# Patient Record
Sex: FEMALE | Race: WHITE | NOT HISPANIC OR LATINO | Employment: OTHER | URBAN - METROPOLITAN AREA
[De-identification: names, ages, dates, MRNs, and addresses within clinical notes are randomized per-mention and may not be internally consistent; named-entity substitution may affect disease eponyms.]

---

## 2019-02-22 ENCOUNTER — HOSPITAL ENCOUNTER (INPATIENT)
Facility: HOSPITAL | Age: 79
LOS: 4 days | Discharge: NURSING, REHAB OR LONG-TERM CARE | End: 2019-02-26
Attending: EMERGENCY MEDICINE | Admitting: INTERNAL MEDICINE

## 2019-02-22 ENCOUNTER — APPOINTMENT (OUTPATIENT)
Dept: GENERAL RADIOLOGY | Facility: HOSPITAL | Age: 79
End: 2019-02-22

## 2019-02-22 DIAGNOSIS — R26.2 DIFFICULTY WALKING: ICD-10-CM

## 2019-02-22 DIAGNOSIS — S72.002A LEFT DISPLACED FEMORAL NECK FRACTURE (HCC): Primary | ICD-10-CM

## 2019-02-22 PROBLEM — I10 HYPERTENSION: Status: ACTIVE | Noted: 2019-02-22

## 2019-02-22 PROBLEM — R73.02 IMPAIRED GLUCOSE TOLERANCE: Status: ACTIVE | Noted: 2019-02-22

## 2019-02-22 PROBLEM — E03.9 HYPOTHYROIDISM: Status: ACTIVE | Noted: 2019-02-22

## 2019-02-22 LAB
ABO GROUP BLD: NORMAL
ALBUMIN SERPL-MCNC: 4.4 G/DL (ref 3.5–5.2)
ALBUMIN/GLOB SERPL: 1.6 G/DL
ALP SERPL-CCNC: 32 U/L (ref 39–117)
ALT SERPL W P-5'-P-CCNC: 17 U/L (ref 1–33)
ANION GAP SERPL CALCULATED.3IONS-SCNC: 13.2 MMOL/L
AST SERPL-CCNC: 20 U/L (ref 1–32)
BASOPHILS # BLD AUTO: 0.07 10*3/MM3 (ref 0–0.2)
BASOPHILS NFR BLD AUTO: 0.6 % (ref 0–1.5)
BILIRUB SERPL-MCNC: 0.3 MG/DL (ref 0.1–1.2)
BLD GP AB SCN SERPL QL: NEGATIVE
BUN BLD-MCNC: 14 MG/DL (ref 8–23)
BUN/CREAT SERPL: 18.9 (ref 7–25)
CALCIUM SPEC-SCNC: 9.4 MG/DL (ref 8.6–10.5)
CHLORIDE SERPL-SCNC: 100 MMOL/L (ref 98–107)
CO2 SERPL-SCNC: 26.8 MMOL/L (ref 22–29)
CREAT BLD-MCNC: 0.74 MG/DL (ref 0.57–1)
DEPRECATED RDW RBC AUTO: 46.9 FL (ref 37–54)
EOSINOPHIL # BLD AUTO: 0.12 10*3/MM3 (ref 0–0.4)
EOSINOPHIL NFR BLD AUTO: 1 % (ref 0.3–6.2)
ERYTHROCYTE [DISTWIDTH] IN BLOOD BY AUTOMATED COUNT: 13.9 % (ref 12.3–15.4)
GFR SERPL CREATININE-BSD FRML MDRD: 76 ML/MIN/1.73
GLOBULIN UR ELPH-MCNC: 2.8 GM/DL
GLUCOSE BLD-MCNC: 113 MG/DL (ref 65–99)
HCT VFR BLD AUTO: 42.4 % (ref 34–46.6)
HGB BLD-MCNC: 13.3 G/DL (ref 12–15.9)
IMM GRANULOCYTES # BLD AUTO: 0.07 10*3/MM3 (ref 0–0.05)
IMM GRANULOCYTES NFR BLD AUTO: 0.6 % (ref 0–0.5)
INR PPP: 1.04 (ref 0.9–1.1)
LYMPHOCYTES # BLD AUTO: 1.05 10*3/MM3 (ref 0.7–3.1)
LYMPHOCYTES NFR BLD AUTO: 8.5 % (ref 19.6–45.3)
MCH RBC QN AUTO: 28.9 PG (ref 26.6–33)
MCHC RBC AUTO-ENTMCNC: 31.4 G/DL (ref 31.5–35.7)
MCV RBC AUTO: 92.2 FL (ref 79–97)
MONOCYTES # BLD AUTO: 0.89 10*3/MM3 (ref 0.1–0.9)
MONOCYTES NFR BLD AUTO: 7.2 % (ref 5–12)
NEUTROPHILS # BLD AUTO: 10.12 10*3/MM3 (ref 1.4–7)
NEUTROPHILS NFR BLD AUTO: 82.1 % (ref 42.7–76)
NRBC BLD AUTO-RTO: 0 /100 WBC (ref 0–0)
PLATELET # BLD AUTO: 308 10*3/MM3 (ref 140–450)
PMV BLD AUTO: 10.9 FL (ref 6–12)
POTASSIUM BLD-SCNC: 4.6 MMOL/L (ref 3.5–5.2)
PROT SERPL-MCNC: 7.2 G/DL (ref 6–8.5)
PROTHROMBIN TIME: 13.4 SECONDS (ref 11.7–14.2)
RBC # BLD AUTO: 4.6 10*6/MM3 (ref 3.77–5.28)
RH BLD: POSITIVE
SODIUM BLD-SCNC: 140 MMOL/L (ref 136–145)
T&S EXPIRATION DATE: NORMAL
WBC NRBC COR # BLD: 12.32 10*3/MM3 (ref 3.4–10.8)

## 2019-02-22 PROCEDURE — 71045 X-RAY EXAM CHEST 1 VIEW: CPT

## 2019-02-22 PROCEDURE — 73502 X-RAY EXAM HIP UNI 2-3 VIEWS: CPT

## 2019-02-22 PROCEDURE — 86850 RBC ANTIBODY SCREEN: CPT | Performed by: EMERGENCY MEDICINE

## 2019-02-22 PROCEDURE — 99284 EMERGENCY DEPT VISIT MOD MDM: CPT

## 2019-02-22 PROCEDURE — 86900 BLOOD TYPING SEROLOGIC ABO: CPT | Performed by: EMERGENCY MEDICINE

## 2019-02-22 PROCEDURE — 85025 COMPLETE CBC W/AUTO DIFF WBC: CPT | Performed by: EMERGENCY MEDICINE

## 2019-02-22 PROCEDURE — 25010000002 HYDROMORPHONE PER 4 MG: Performed by: EMERGENCY MEDICINE

## 2019-02-22 PROCEDURE — 93005 ELECTROCARDIOGRAM TRACING: CPT | Performed by: HOSPITALIST

## 2019-02-22 PROCEDURE — 86901 BLOOD TYPING SEROLOGIC RH(D): CPT | Performed by: EMERGENCY MEDICINE

## 2019-02-22 PROCEDURE — 85610 PROTHROMBIN TIME: CPT | Performed by: EMERGENCY MEDICINE

## 2019-02-22 PROCEDURE — 80053 COMPREHEN METABOLIC PANEL: CPT | Performed by: EMERGENCY MEDICINE

## 2019-02-22 PROCEDURE — 25010000002 HYDROMORPHONE 1 MG/ML SOLUTION: Performed by: HOSPITALIST

## 2019-02-22 PROCEDURE — 25010000002 ONDANSETRON PER 1 MG: Performed by: EMERGENCY MEDICINE

## 2019-02-22 PROCEDURE — 93010 ELECTROCARDIOGRAM REPORT: CPT | Performed by: INTERNAL MEDICINE

## 2019-02-22 RX ORDER — LETROZOLE 2.5 MG/1
TABLET, FILM COATED ORAL
COMMUNITY
Start: 2018-03-15 | End: 2019-02-22

## 2019-02-22 RX ORDER — ONDANSETRON 4 MG/1
4 TABLET, FILM COATED ORAL EVERY 6 HOURS PRN
Status: DISCONTINUED | OUTPATIENT
Start: 2019-02-22 | End: 2019-02-26 | Stop reason: HOSPADM

## 2019-02-22 RX ORDER — SODIUM CHLORIDE 0.9 % (FLUSH) 0.9 %
3-10 SYRINGE (ML) INJECTION AS NEEDED
Status: DISCONTINUED | OUTPATIENT
Start: 2019-02-22 | End: 2019-02-26 | Stop reason: HOSPADM

## 2019-02-22 RX ORDER — SODIUM CHLORIDE 9 MG/ML
100 INJECTION, SOLUTION INTRAVENOUS CONTINUOUS
Status: DISCONTINUED | OUTPATIENT
Start: 2019-02-23 | End: 2019-02-24

## 2019-02-22 RX ORDER — OMEGA-3S/DHA/EPA/FISH OIL/D3 300MG-1000
400 CAPSULE ORAL DAILY
Status: DISCONTINUED | OUTPATIENT
Start: 2019-02-23 | End: 2019-02-26 | Stop reason: HOSPADM

## 2019-02-22 RX ORDER — HYDROCODONE BITARTRATE AND ACETAMINOPHEN 5; 325 MG/1; MG/1
1 TABLET ORAL EVERY 4 HOURS PRN
Status: DISCONTINUED | OUTPATIENT
Start: 2019-02-22 | End: 2019-02-26 | Stop reason: HOSPADM

## 2019-02-22 RX ORDER — ONDANSETRON 4 MG/1
4 TABLET, ORALLY DISINTEGRATING ORAL EVERY 6 HOURS PRN
Status: DISCONTINUED | OUTPATIENT
Start: 2019-02-22 | End: 2019-02-26 | Stop reason: HOSPADM

## 2019-02-22 RX ORDER — ONDANSETRON 2 MG/ML
4 INJECTION INTRAMUSCULAR; INTRAVENOUS EVERY 6 HOURS PRN
Status: DISCONTINUED | OUTPATIENT
Start: 2019-02-22 | End: 2019-02-26 | Stop reason: HOSPADM

## 2019-02-22 RX ORDER — SIMVASTATIN 40 MG
TABLET ORAL
COMMUNITY
Start: 2016-07-25 | End: 2019-02-22 | Stop reason: SDUPTHER

## 2019-02-22 RX ORDER — SIMVASTATIN 40 MG
40 TABLET ORAL DAILY
COMMUNITY
End: 2022-09-03 | Stop reason: HOSPADM

## 2019-02-22 RX ORDER — LETROZOLE 2.5 MG/1
2.5 TABLET, FILM COATED ORAL DAILY
Status: DISCONTINUED | OUTPATIENT
Start: 2019-02-23 | End: 2019-02-26 | Stop reason: HOSPADM

## 2019-02-22 RX ORDER — LEVOTHYROXINE SODIUM 112 UG/1
112 TABLET ORAL
Status: DISCONTINUED | OUTPATIENT
Start: 2019-02-23 | End: 2019-02-26 | Stop reason: HOSPADM

## 2019-02-22 RX ORDER — ERGOCALCIFEROL (VITAMIN D2) 10 MCG
400 TABLET ORAL DAILY
COMMUNITY

## 2019-02-22 RX ORDER — LISINOPRIL 10 MG/1
TABLET ORAL
COMMUNITY
End: 2019-02-22

## 2019-02-22 RX ORDER — LEVOTHYROXINE SODIUM 112 UG/1
TABLET ORAL
COMMUNITY
Start: 2016-06-23 | End: 2019-02-22

## 2019-02-22 RX ORDER — CHLORAL HYDRATE 500 MG
1000 CAPSULE ORAL 2 TIMES DAILY
COMMUNITY

## 2019-02-22 RX ORDER — ACETAMINOPHEN 325 MG/1
650 TABLET ORAL EVERY 4 HOURS PRN
Status: DISCONTINUED | OUTPATIENT
Start: 2019-02-22 | End: 2019-02-26 | Stop reason: HOSPADM

## 2019-02-22 RX ORDER — CALCIUM CARBONATE 500(1250)
500 TABLET ORAL DAILY
Status: DISCONTINUED | OUTPATIENT
Start: 2019-02-23 | End: 2019-02-26 | Stop reason: HOSPADM

## 2019-02-22 RX ORDER — LEVOTHYROXINE SODIUM 112 UG/1
112 TABLET ORAL DAILY
COMMUNITY
End: 2022-09-03 | Stop reason: HOSPADM

## 2019-02-22 RX ORDER — TRIAMCINOLONE ACETONIDE 1 MG/G
1 CREAM TOPICAL AS NEEDED
COMMUNITY

## 2019-02-22 RX ORDER — ATORVASTATIN CALCIUM 20 MG/1
20 TABLET, FILM COATED ORAL DAILY
Status: DISCONTINUED | OUTPATIENT
Start: 2019-02-23 | End: 2019-02-26 | Stop reason: HOSPADM

## 2019-02-22 RX ORDER — NALOXONE HCL 0.4 MG/ML
0.4 VIAL (ML) INJECTION
Status: DISCONTINUED | OUTPATIENT
Start: 2019-02-22 | End: 2019-02-26 | Stop reason: HOSPADM

## 2019-02-22 RX ORDER — HYDROMORPHONE HYDROCHLORIDE 1 MG/ML
0.5 INJECTION, SOLUTION INTRAMUSCULAR; INTRAVENOUS; SUBCUTANEOUS ONCE
Status: COMPLETED | OUTPATIENT
Start: 2019-02-22 | End: 2019-02-22

## 2019-02-22 RX ORDER — MULTIPLE VITAMINS W/ MINERALS TAB 9MG-400MCG
1 TAB ORAL DAILY
Status: DISCONTINUED | OUTPATIENT
Start: 2019-02-23 | End: 2019-02-26 | Stop reason: HOSPADM

## 2019-02-22 RX ORDER — ASPIRIN 81 MG/1
81 TABLET ORAL DAILY
Status: DISCONTINUED | OUTPATIENT
Start: 2019-02-23 | End: 2019-02-23

## 2019-02-22 RX ORDER — TRIAMCINOLONE ACETONIDE 1 MG/G
CREAM TOPICAL
COMMUNITY
End: 2019-02-22

## 2019-02-22 RX ORDER — LETROZOLE 2.5 MG/1
2.5 TABLET, FILM COATED ORAL DAILY
COMMUNITY

## 2019-02-22 RX ORDER — LISINOPRIL 10 MG/1
10 TABLET ORAL DAILY
COMMUNITY
End: 2019-02-26 | Stop reason: HOSPADM

## 2019-02-22 RX ORDER — ONDANSETRON 2 MG/ML
4 INJECTION INTRAMUSCULAR; INTRAVENOUS ONCE
Status: COMPLETED | OUTPATIENT
Start: 2019-02-22 | End: 2019-02-22

## 2019-02-22 RX ORDER — SODIUM CHLORIDE 0.9 % (FLUSH) 0.9 %
3 SYRINGE (ML) INJECTION EVERY 12 HOURS SCHEDULED
Status: DISCONTINUED | OUTPATIENT
Start: 2019-02-23 | End: 2019-02-26 | Stop reason: HOSPADM

## 2019-02-22 RX ADMIN — HYDROMORPHONE HYDROCHLORIDE 0.5 MG: 1 INJECTION, SOLUTION INTRAMUSCULAR; INTRAVENOUS; SUBCUTANEOUS at 18:26

## 2019-02-22 RX ADMIN — HYDROMORPHONE HYDROCHLORIDE 0.5 MG: 1 INJECTION, SOLUTION INTRAMUSCULAR; INTRAVENOUS; SUBCUTANEOUS at 22:28

## 2019-02-22 RX ADMIN — ONDANSETRON HYDROCHLORIDE 4 MG: 2 SOLUTION INTRAMUSCULAR; INTRAVENOUS at 18:26

## 2019-02-23 ENCOUNTER — ANESTHESIA (OUTPATIENT)
Dept: PERIOP | Facility: HOSPITAL | Age: 79
End: 2019-02-23

## 2019-02-23 ENCOUNTER — APPOINTMENT (OUTPATIENT)
Dept: GENERAL RADIOLOGY | Facility: HOSPITAL | Age: 79
End: 2019-02-23

## 2019-02-23 ENCOUNTER — ANESTHESIA EVENT (OUTPATIENT)
Dept: PERIOP | Facility: HOSPITAL | Age: 79
End: 2019-02-23

## 2019-02-23 LAB
ANION GAP SERPL CALCULATED.3IONS-SCNC: 12.2 MMOL/L
BUN BLD-MCNC: 15 MG/DL (ref 8–23)
BUN/CREAT SERPL: 18.3 (ref 7–25)
CALCIUM SPEC-SCNC: 9 MG/DL (ref 8.6–10.5)
CHLORIDE SERPL-SCNC: 99 MMOL/L (ref 98–107)
CO2 SERPL-SCNC: 27.8 MMOL/L (ref 22–29)
CREAT BLD-MCNC: 0.82 MG/DL (ref 0.57–1)
DEPRECATED RDW RBC AUTO: 49.4 FL (ref 37–54)
ERYTHROCYTE [DISTWIDTH] IN BLOOD BY AUTOMATED COUNT: 14.2 % (ref 12.3–15.4)
GFR SERPL CREATININE-BSD FRML MDRD: 68 ML/MIN/1.73
GLUCOSE BLD-MCNC: 104 MG/DL (ref 65–99)
HCT VFR BLD AUTO: 33.3 % (ref 34–46.6)
HCT VFR BLD AUTO: 40.8 % (ref 34–46.6)
HGB BLD-MCNC: 10.3 G/DL (ref 12–15.9)
HGB BLD-MCNC: 12.8 G/DL (ref 12–15.9)
MCH RBC QN AUTO: 29.4 PG (ref 26.6–33)
MCHC RBC AUTO-ENTMCNC: 31.4 G/DL (ref 31.5–35.7)
MCV RBC AUTO: 93.8 FL (ref 79–97)
PLATELET # BLD AUTO: 267 10*3/MM3 (ref 140–450)
PMV BLD AUTO: 10.4 FL (ref 6–12)
POTASSIUM BLD-SCNC: 4.4 MMOL/L (ref 3.5–5.2)
RBC # BLD AUTO: 4.35 10*6/MM3 (ref 3.77–5.28)
SODIUM BLD-SCNC: 139 MMOL/L (ref 136–145)
WBC NRBC COR # BLD: 9.9 10*3/MM3 (ref 3.4–10.8)

## 2019-02-23 PROCEDURE — 76000 FLUOROSCOPY <1 HR PHYS/QHP: CPT

## 2019-02-23 PROCEDURE — 25010000003 CEFAZOLIN IN DEXTROSE 2-4 GM/100ML-% SOLUTION: Performed by: ORTHOPAEDIC SURGERY

## 2019-02-23 PROCEDURE — C1776 JOINT DEVICE (IMPLANTABLE): HCPCS | Performed by: ORTHOPAEDIC SURGERY

## 2019-02-23 PROCEDURE — 25010000002 MIDAZOLAM PER 1 MG: Performed by: ANESTHESIOLOGY

## 2019-02-23 PROCEDURE — 25010000002 NEOSTIGMINE 0.5 MG/ML SOLUTION: Performed by: ANESTHESIOLOGY

## 2019-02-23 PROCEDURE — 80048 BASIC METABOLIC PNL TOTAL CA: CPT | Performed by: HOSPITALIST

## 2019-02-23 PROCEDURE — 73501 X-RAY EXAM HIP UNI 1 VIEW: CPT

## 2019-02-23 PROCEDURE — 0SRB04A REPLACEMENT OF LEFT HIP JOINT WITH CERAMIC ON POLYETHYLENE SYNTHETIC SUBSTITUTE, UNCEMENTED, OPEN APPROACH: ICD-10-PCS | Performed by: ORTHOPAEDIC SURGERY

## 2019-02-23 PROCEDURE — 85027 COMPLETE CBC AUTOMATED: CPT | Performed by: HOSPITALIST

## 2019-02-23 PROCEDURE — 85014 HEMATOCRIT: CPT | Performed by: INTERNAL MEDICINE

## 2019-02-23 PROCEDURE — 25010000002 ONDANSETRON PER 1 MG: Performed by: ANESTHESIOLOGY

## 2019-02-23 PROCEDURE — 25010000002 HYDROMORPHONE 1 MG/ML SOLUTION: Performed by: HOSPITALIST

## 2019-02-23 PROCEDURE — 25010000002 FENTANYL CITRATE (PF) 100 MCG/2ML SOLUTION: Performed by: ANESTHESIOLOGY

## 2019-02-23 PROCEDURE — 25010000002 PROPOFOL 10 MG/ML EMULSION: Performed by: ANESTHESIOLOGY

## 2019-02-23 PROCEDURE — 25010000002 ROPIVACAINE PER 1 MG: Performed by: ORTHOPAEDIC SURGERY

## 2019-02-23 PROCEDURE — 85018 HEMOGLOBIN: CPT | Performed by: INTERNAL MEDICINE

## 2019-02-23 PROCEDURE — 25010000002 KETOROLAC TROMETHAMINE PER 15 MG: Performed by: ORTHOPAEDIC SURGERY

## 2019-02-23 PROCEDURE — 25010000002 CLONIDINE PER 1 MG: Performed by: ORTHOPAEDIC SURGERY

## 2019-02-23 DEVICE — BIOLOX DELTA CERAMIC FEMORAL HEAD 32MM DIA +5.0 12/14 TAPER
Type: IMPLANTABLE DEVICE | Site: HIP | Status: FUNCTIONAL
Brand: BIOLOX DELTA

## 2019-02-23 DEVICE — TOTL HIP GRIPTION CUP DEPUY UPCHRG: Type: IMPLANTABLE DEVICE | Site: HIP | Status: FUNCTIONAL

## 2019-02-23 DEVICE — PINNACLE GRIPTION ACETABULAR SHELL SECTOR 50MM OD
Type: IMPLANTABLE DEVICE | Site: HIP | Status: FUNCTIONAL
Brand: PINNACLE GRIPTION

## 2019-02-23 DEVICE — TOTL HIP STEM DEPUY UPCHRG: Type: IMPLANTABLE DEVICE | Site: HIP | Status: FUNCTIONAL

## 2019-02-23 DEVICE — CORAIL HIP SYSTEM CEMENTLESS FEMORAL STEM 12/14 AMT 135 DEGREES KA SIZE 11 HA COATED STANDARD COLLAR
Type: IMPLANTABLE DEVICE | Site: HIP | Status: FUNCTIONAL
Brand: CORAIL

## 2019-02-23 DEVICE — SUT FW #2 W/TPR NDL 1/2 CIR 38IN 97CM 26.5MM BLU: Type: IMPLANTABLE DEVICE | Site: HIP | Status: FUNCTIONAL

## 2019-02-23 DEVICE — TOTL HIP COA DEPUY 9641334: Type: IMPLANTABLE DEVICE | Site: HIP | Status: FUNCTIONAL

## 2019-02-23 DEVICE — PINNACLE HIP SOLUTIONS ALTRX POLYETHYLENE ACETABULAR LINER NEUTRAL 32MM ID 50MM OD
Type: IMPLANTABLE DEVICE | Site: HIP | Status: FUNCTIONAL
Brand: PINNACLE ALTRX

## 2019-02-23 RX ORDER — MIDAZOLAM HYDROCHLORIDE 1 MG/ML
1 INJECTION INTRAMUSCULAR; INTRAVENOUS
Status: DISCONTINUED | OUTPATIENT
Start: 2019-02-23 | End: 2019-02-23 | Stop reason: HOSPADM

## 2019-02-23 RX ORDER — PROMETHAZINE HYDROCHLORIDE 25 MG/ML
12.5 INJECTION, SOLUTION INTRAMUSCULAR; INTRAVENOUS ONCE AS NEEDED
Status: DISCONTINUED | OUTPATIENT
Start: 2019-02-23 | End: 2019-02-23 | Stop reason: HOSPADM

## 2019-02-23 RX ORDER — LABETALOL HYDROCHLORIDE 5 MG/ML
5 INJECTION, SOLUTION INTRAVENOUS
Status: DISCONTINUED | OUTPATIENT
Start: 2019-02-23 | End: 2019-02-23 | Stop reason: HOSPADM

## 2019-02-23 RX ORDER — FAMOTIDINE 10 MG/ML
20 INJECTION, SOLUTION INTRAVENOUS ONCE
Status: COMPLETED | OUTPATIENT
Start: 2019-02-23 | End: 2019-02-23

## 2019-02-23 RX ORDER — FENTANYL CITRATE 50 UG/ML
50 INJECTION, SOLUTION INTRAMUSCULAR; INTRAVENOUS
Status: DISCONTINUED | OUTPATIENT
Start: 2019-02-23 | End: 2019-02-23 | Stop reason: HOSPADM

## 2019-02-23 RX ORDER — HYDRALAZINE HYDROCHLORIDE 20 MG/ML
5 INJECTION INTRAMUSCULAR; INTRAVENOUS
Status: DISCONTINUED | OUTPATIENT
Start: 2019-02-23 | End: 2019-02-23 | Stop reason: HOSPADM

## 2019-02-23 RX ORDER — BISACODYL 5 MG/1
10 TABLET, DELAYED RELEASE ORAL DAILY PRN
Status: DISCONTINUED | OUTPATIENT
Start: 2019-02-23 | End: 2019-02-26 | Stop reason: HOSPADM

## 2019-02-23 RX ORDER — EPHEDRINE SULFATE 50 MG/ML
5 INJECTION, SOLUTION INTRAVENOUS ONCE AS NEEDED
Status: DISCONTINUED | OUTPATIENT
Start: 2019-02-23 | End: 2019-02-23 | Stop reason: HOSPADM

## 2019-02-23 RX ORDER — HYDROCODONE BITARTRATE AND ACETAMINOPHEN 7.5; 325 MG/1; MG/1
1 TABLET ORAL ONCE AS NEEDED
Status: DISCONTINUED | OUTPATIENT
Start: 2019-02-23 | End: 2019-02-23 | Stop reason: HOSPADM

## 2019-02-23 RX ORDER — FENTANYL CITRATE 50 UG/ML
100 INJECTION, SOLUTION INTRAMUSCULAR; INTRAVENOUS
Status: DISCONTINUED | OUTPATIENT
Start: 2019-02-23 | End: 2019-02-23 | Stop reason: HOSPADM

## 2019-02-23 RX ORDER — DIPHENHYDRAMINE HCL 25 MG
25 CAPSULE ORAL
Status: DISCONTINUED | OUTPATIENT
Start: 2019-02-23 | End: 2019-02-23 | Stop reason: HOSPADM

## 2019-02-23 RX ORDER — SODIUM CHLORIDE 0.9 % (FLUSH) 0.9 %
1-10 SYRINGE (ML) INJECTION AS NEEDED
Status: DISCONTINUED | OUTPATIENT
Start: 2019-02-23 | End: 2019-02-23 | Stop reason: HOSPADM

## 2019-02-23 RX ORDER — LIDOCAINE HYDROCHLORIDE 20 MG/ML
INJECTION, SOLUTION INFILTRATION; PERINEURAL AS NEEDED
Status: DISCONTINUED | OUTPATIENT
Start: 2019-02-23 | End: 2019-02-23 | Stop reason: SURG

## 2019-02-23 RX ORDER — FLUMAZENIL 0.1 MG/ML
0.2 INJECTION INTRAVENOUS AS NEEDED
Status: DISCONTINUED | OUTPATIENT
Start: 2019-02-23 | End: 2019-02-23 | Stop reason: HOSPADM

## 2019-02-23 RX ORDER — GLYCOPYRROLATE 0.2 MG/ML
INJECTION INTRAMUSCULAR; INTRAVENOUS AS NEEDED
Status: DISCONTINUED | OUTPATIENT
Start: 2019-02-23 | End: 2019-02-23 | Stop reason: SURG

## 2019-02-23 RX ORDER — BISACODYL 10 MG
10 SUPPOSITORY, RECTAL RECTAL DAILY PRN
Status: DISCONTINUED | OUTPATIENT
Start: 2019-02-23 | End: 2019-02-26 | Stop reason: HOSPADM

## 2019-02-23 RX ORDER — DIPHENHYDRAMINE HYDROCHLORIDE 50 MG/ML
12.5 INJECTION INTRAMUSCULAR; INTRAVENOUS
Status: DISCONTINUED | OUTPATIENT
Start: 2019-02-23 | End: 2019-02-23 | Stop reason: HOSPADM

## 2019-02-23 RX ORDER — ACETAMINOPHEN 325 MG/1
650 TABLET ORAL ONCE AS NEEDED
Status: DISCONTINUED | OUTPATIENT
Start: 2019-02-23 | End: 2019-02-23 | Stop reason: HOSPADM

## 2019-02-23 RX ORDER — ONDANSETRON 2 MG/ML
4 INJECTION INTRAMUSCULAR; INTRAVENOUS ONCE AS NEEDED
Status: DISCONTINUED | OUTPATIENT
Start: 2019-02-23 | End: 2019-02-23 | Stop reason: HOSPADM

## 2019-02-23 RX ORDER — ACETAMINOPHEN 500 MG
1000 TABLET ORAL ONCE
Status: COMPLETED | OUTPATIENT
Start: 2019-02-23 | End: 2019-02-23

## 2019-02-23 RX ORDER — NALOXONE HCL 0.4 MG/ML
0.2 VIAL (ML) INJECTION AS NEEDED
Status: DISCONTINUED | OUTPATIENT
Start: 2019-02-23 | End: 2019-02-23 | Stop reason: HOSPADM

## 2019-02-23 RX ORDER — PROMETHAZINE HYDROCHLORIDE 25 MG/1
25 TABLET ORAL ONCE AS NEEDED
Status: DISCONTINUED | OUTPATIENT
Start: 2019-02-23 | End: 2019-02-23 | Stop reason: HOSPADM

## 2019-02-23 RX ORDER — SODIUM CHLORIDE 9 MG/ML
100 INJECTION, SOLUTION INTRAVENOUS CONTINUOUS
Status: DISCONTINUED | OUTPATIENT
Start: 2019-02-23 | End: 2019-02-24

## 2019-02-23 RX ORDER — ASPIRIN 325 MG
325 TABLET, DELAYED RELEASE (ENTERIC COATED) ORAL EVERY 12 HOURS SCHEDULED
Status: DISCONTINUED | OUTPATIENT
Start: 2019-02-24 | End: 2019-02-26 | Stop reason: HOSPADM

## 2019-02-23 RX ORDER — ONDANSETRON 2 MG/ML
INJECTION INTRAMUSCULAR; INTRAVENOUS AS NEEDED
Status: DISCONTINUED | OUTPATIENT
Start: 2019-02-23 | End: 2019-02-23 | Stop reason: SURG

## 2019-02-23 RX ORDER — PROPOFOL 10 MG/ML
VIAL (ML) INTRAVENOUS AS NEEDED
Status: DISCONTINUED | OUTPATIENT
Start: 2019-02-23 | End: 2019-02-23 | Stop reason: SURG

## 2019-02-23 RX ORDER — OXYCODONE AND ACETAMINOPHEN 7.5; 325 MG/1; MG/1
1 TABLET ORAL ONCE AS NEEDED
Status: DISCONTINUED | OUTPATIENT
Start: 2019-02-23 | End: 2019-02-23 | Stop reason: HOSPADM

## 2019-02-23 RX ORDER — MAGNESIUM HYDROXIDE 1200 MG/15ML
LIQUID ORAL AS NEEDED
Status: DISCONTINUED | OUTPATIENT
Start: 2019-02-23 | End: 2019-02-23 | Stop reason: HOSPADM

## 2019-02-23 RX ORDER — TRANEXAMIC ACID 100 MG/ML
INJECTION, SOLUTION INTRAVENOUS AS NEEDED
Status: DISCONTINUED | OUTPATIENT
Start: 2019-02-23 | End: 2019-02-23 | Stop reason: SURG

## 2019-02-23 RX ORDER — ROCURONIUM BROMIDE 10 MG/ML
INJECTION, SOLUTION INTRAVENOUS AS NEEDED
Status: DISCONTINUED | OUTPATIENT
Start: 2019-02-23 | End: 2019-02-23 | Stop reason: SURG

## 2019-02-23 RX ORDER — CEFAZOLIN SODIUM 2 G/100ML
2 INJECTION, SOLUTION INTRAVENOUS ONCE
Status: COMPLETED | OUTPATIENT
Start: 2019-02-23 | End: 2019-02-23

## 2019-02-23 RX ORDER — OXYCODONE HCL 10 MG/1
10 TABLET, FILM COATED, EXTENDED RELEASE ORAL ONCE
Status: COMPLETED | OUTPATIENT
Start: 2019-02-23 | End: 2019-02-23

## 2019-02-23 RX ORDER — FENTANYL CITRATE 50 UG/ML
INJECTION, SOLUTION INTRAMUSCULAR; INTRAVENOUS
Status: COMPLETED
Start: 2019-02-23 | End: 2019-02-23

## 2019-02-23 RX ORDER — PROMETHAZINE HYDROCHLORIDE 25 MG/1
25 SUPPOSITORY RECTAL ONCE AS NEEDED
Status: DISCONTINUED | OUTPATIENT
Start: 2019-02-23 | End: 2019-02-23 | Stop reason: HOSPADM

## 2019-02-23 RX ORDER — LIDOCAINE HYDROCHLORIDE 10 MG/ML
0.5 INJECTION, SOLUTION EPIDURAL; INFILTRATION; INTRACAUDAL; PERINEURAL ONCE AS NEEDED
Status: DISCONTINUED | OUTPATIENT
Start: 2019-02-23 | End: 2019-02-23 | Stop reason: HOSPADM

## 2019-02-23 RX ORDER — HYDROMORPHONE HYDROCHLORIDE 1 MG/ML
0.5 INJECTION, SOLUTION INTRAMUSCULAR; INTRAVENOUS; SUBCUTANEOUS
Status: DISCONTINUED | OUTPATIENT
Start: 2019-02-23 | End: 2019-02-23 | Stop reason: HOSPADM

## 2019-02-23 RX ORDER — SODIUM CHLORIDE 0.9 % (FLUSH) 0.9 %
3 SYRINGE (ML) INJECTION EVERY 12 HOURS SCHEDULED
Status: DISCONTINUED | OUTPATIENT
Start: 2019-02-23 | End: 2019-02-23 | Stop reason: HOSPADM

## 2019-02-23 RX ORDER — SODIUM CHLORIDE, SODIUM LACTATE, POTASSIUM CHLORIDE, CALCIUM CHLORIDE 600; 310; 30; 20 MG/100ML; MG/100ML; MG/100ML; MG/100ML
9 INJECTION, SOLUTION INTRAVENOUS CONTINUOUS
Status: DISCONTINUED | OUTPATIENT
Start: 2019-02-23 | End: 2019-02-24

## 2019-02-23 RX ORDER — DOCUSATE SODIUM 100 MG/1
100 CAPSULE, LIQUID FILLED ORAL 2 TIMES DAILY PRN
Status: DISCONTINUED | OUTPATIENT
Start: 2019-02-23 | End: 2019-02-26 | Stop reason: HOSPADM

## 2019-02-23 RX ORDER — MIDAZOLAM HYDROCHLORIDE 1 MG/ML
2 INJECTION INTRAMUSCULAR; INTRAVENOUS
Status: DISCONTINUED | OUTPATIENT
Start: 2019-02-23 | End: 2019-02-23 | Stop reason: HOSPADM

## 2019-02-23 RX ORDER — CEFAZOLIN SODIUM 2 G/100ML
2 INJECTION, SOLUTION INTRAVENOUS EVERY 8 HOURS
Status: COMPLETED | OUTPATIENT
Start: 2019-02-23 | End: 2019-02-24

## 2019-02-23 RX ADMIN — HYDROMORPHONE HYDROCHLORIDE 0.5 MG: 1 INJECTION, SOLUTION INTRAMUSCULAR; INTRAVENOUS; SUBCUTANEOUS at 05:26

## 2019-02-23 RX ADMIN — LETROZOLE 2.5 MG: 2.5 TABLET ORAL at 08:55

## 2019-02-23 RX ADMIN — SODIUM CHLORIDE, PRESERVATIVE FREE 3 ML: 5 INJECTION INTRAVENOUS at 08:55

## 2019-02-23 RX ADMIN — ROCURONIUM BROMIDE 10 MG: 10 INJECTION INTRAVENOUS at 13:50

## 2019-02-23 RX ADMIN — ATORVASTATIN CALCIUM 20 MG: 20 TABLET, FILM COATED ORAL at 08:55

## 2019-02-23 RX ADMIN — OXYCODONE HYDROCHLORIDE 10 MG: 10 TABLET, FILM COATED, EXTENDED RELEASE ORAL at 12:46

## 2019-02-23 RX ADMIN — LEVOTHYROXINE SODIUM 112 MCG: 0.11 TABLET ORAL at 05:31

## 2019-02-23 RX ADMIN — EPHEDRINE SULFATE 5 MG: 50 INJECTION INTRAMUSCULAR; INTRAVENOUS; SUBCUTANEOUS at 13:32

## 2019-02-23 RX ADMIN — FENTANYL CITRATE 25 MCG: 50 INJECTION, SOLUTION INTRAMUSCULAR; INTRAVENOUS at 12:48

## 2019-02-23 RX ADMIN — SODIUM CHLORIDE, POTASSIUM CHLORIDE, SODIUM LACTATE AND CALCIUM CHLORIDE 9 ML/HR: 600; 310; 30; 20 INJECTION, SOLUTION INTRAVENOUS at 12:46

## 2019-02-23 RX ADMIN — FAMOTIDINE 20 MG: 10 INJECTION INTRAVENOUS at 12:48

## 2019-02-23 RX ADMIN — MIDAZOLAM 0.5 MG: 1 INJECTION INTRAMUSCULAR; INTRAVENOUS at 12:48

## 2019-02-23 RX ADMIN — PROPOFOL 50 MG: 10 INJECTION, EMULSION INTRAVENOUS at 13:27

## 2019-02-23 RX ADMIN — MUPIROCIN 1 APPLICATION: 20 OINTMENT TOPICAL at 13:07

## 2019-02-23 RX ADMIN — CEFAZOLIN SODIUM 2 G: 2 INJECTION, SOLUTION INTRAVENOUS at 20:35

## 2019-02-23 RX ADMIN — SODIUM CHLORIDE 100 ML/HR: 9 INJECTION, SOLUTION INTRAVENOUS at 17:59

## 2019-02-23 RX ADMIN — ONDANSETRON 4 MG: 2 INJECTION INTRAMUSCULAR; INTRAVENOUS at 13:46

## 2019-02-23 RX ADMIN — GLYCOPYRROLATE 0.3 MG: 0.2 INJECTION INTRAMUSCULAR; INTRAVENOUS at 14:56

## 2019-02-23 RX ADMIN — PROPOFOL 30 MG: 10 INJECTION, EMULSION INTRAVENOUS at 13:29

## 2019-02-23 RX ADMIN — LIDOCAINE HYDROCHLORIDE 40 MG: 20 INJECTION, SOLUTION INFILTRATION; PERINEURAL at 13:27

## 2019-02-23 RX ADMIN — HYDROMORPHONE HYDROCHLORIDE 0.5 MG: 1 INJECTION, SOLUTION INTRAMUSCULAR; INTRAVENOUS; SUBCUTANEOUS at 08:48

## 2019-02-23 RX ADMIN — LIDOCAINE HYDROCHLORIDE 60 MG: 20 INJECTION, SOLUTION INFILTRATION; PERINEURAL at 13:38

## 2019-02-23 RX ADMIN — ASPIRIN 81 MG: 81 TABLET, DELAYED RELEASE ORAL at 08:55

## 2019-02-23 RX ADMIN — SODIUM CHLORIDE 500 ML: 9 INJECTION, SOLUTION INTRAVENOUS at 23:30

## 2019-02-23 RX ADMIN — FENTANYL CITRATE 25 MCG: 50 INJECTION INTRAMUSCULAR; INTRAVENOUS at 13:39

## 2019-02-23 RX ADMIN — SODIUM CHLORIDE 100 ML/HR: 9 INJECTION, SOLUTION INTRAVENOUS at 00:22

## 2019-02-23 RX ADMIN — NEOSTIGMINE METHYLSULFATE 2.5 MG: 5 INJECTION, SOLUTION INTRAMUSCULAR; INTRAVENOUS; SUBCUTANEOUS at 14:56

## 2019-02-23 RX ADMIN — TRANEXAMIC ACID 1000 MG: 100 INJECTION, SOLUTION INTRAVENOUS at 13:45

## 2019-02-23 RX ADMIN — MULTIPLE VITAMINS W/ MINERALS TAB 1 TABLET: TAB at 08:55

## 2019-02-23 RX ADMIN — CALCIUM 500 MG: 500 TABLET ORAL at 08:55

## 2019-02-23 RX ADMIN — CHOLECALCIFEROL TAB 10 MCG (400 UNIT) 400 UNITS: 10 TAB at 08:55

## 2019-02-23 RX ADMIN — HYDROCODONE BITARTRATE AND ACETAMINOPHEN 1 TABLET: 5; 325 TABLET ORAL at 20:35

## 2019-02-23 RX ADMIN — GLYCOPYRROLATE 0.1 MG: 0.2 INJECTION INTRAMUSCULAR; INTRAVENOUS at 13:27

## 2019-02-23 RX ADMIN — SODIUM CHLORIDE, PRESERVATIVE FREE 3 ML: 5 INJECTION INTRAVENOUS at 01:50

## 2019-02-23 RX ADMIN — CEFAZOLIN SODIUM 2 G: 2 INJECTION, SOLUTION INTRAVENOUS at 13:43

## 2019-02-23 RX ADMIN — SODIUM CHLORIDE, PRESERVATIVE FREE 3 ML: 5 INJECTION INTRAVENOUS at 20:42

## 2019-02-23 RX ADMIN — ROCURONIUM BROMIDE 25 MG: 10 INJECTION INTRAVENOUS at 13:38

## 2019-02-23 RX ADMIN — PROPOFOL 10 MG: 10 INJECTION, EMULSION INTRAVENOUS at 13:38

## 2019-02-23 RX ADMIN — ACETAMINOPHEN 1000 MG: 500 TABLET, FILM COATED ORAL at 12:46

## 2019-02-23 NOTE — ANESTHESIA POSTPROCEDURE EVALUATION
Patient: Kennedi Cardenas    Procedure Summary     Date:  02/23/19 Room / Location:  [facility] OR  / [facility] MAIN OR    Anesthesia Start:  1322 Anesthesia Stop:  1530    Procedure:  TOTAL HIP ARTHROPLASTY ANTERIOR (Left Hip) Diagnosis:       Closed displaced fracture of neck of left femur (CMS/HCC)      (Closed displaced fracture of neck of left femur)    Surgeon:  Magdalena De Los Santos MD Provider:  Gunnar Millan MD    Anesthesia Type:  general ASA Status:  2          Anesthesia Type: general  Last vitals  BP   93/52 (02/23/19 1640)   Temp   36.4 °C (97.6 °F) (02/23/19 1620)   Pulse   73 (02/23/19 1640)   Resp   16 (02/23/19 1640)     SpO2   93 % (02/23/19 1640)     Post Anesthesia Care and Evaluation    Patient location during evaluation: bedside  Patient participation: complete - patient participated  Level of consciousness: awake  Pain score: 1  Pain management: adequate  Airway patency: patent  Anesthetic complications: No anesthetic complications    Cardiovascular status: acceptable  Respiratory status: acceptable  Hydration status: acceptable    Comments: --------------------            02/23/19 1640     --------------------   BP:       93/52      Pulse:      73       Resp:       16       Temp:                SpO2:      93%      --------------------

## 2019-02-23 NOTE — ANESTHESIA PREPROCEDURE EVALUATION
Anesthesia Evaluation     NPO Solid Status: > 8 hours             Airway   Mallampati: II  TM distance: >3 FB  Neck ROM: full  no difficulty expected  Dental - normal exam     Pulmonary - normal exam   Cardiovascular - normal exam    (+) hypertension,       Neuro/Psych  GI/Hepatic/Renal/Endo    (+)   hypothyroidism,     Musculoskeletal     Abdominal  - normal exam   Substance History      OB/GYN          Other                        Anesthesia Plan    ASA 2     general     intravenous induction   Anesthetic plan, all risks, benefits, and alternatives have been provided, discussed and informed consent has been obtained with: patient.    Plan discussed with CRNA.

## 2019-02-23 NOTE — ANESTHESIA PROCEDURE NOTES
Airway  Airway not difficult    General Information and Staff    Anesthesiologist: Gunnar Millan MD    Indications and Patient Condition    Preoxygenated: yes  Mask difficulty assessment: 1 - vent by mask    Final Airway Details  Final airway type: endotracheal airway      Successful airway: ETT  Cuffed: yes   Successful intubation technique: direct laryngoscopy  Endotracheal tube insertion site: oral  Blade: Masters  Blade size: 2  ETT size (mm): 7.0  Cormack-Lehane Classification: grade I - full view of glottis  Placement verified by: chest auscultation and capnometry   Measured from: gums  ETT to gums (cm): 21  ETT to teeth (cm): 22    Additional Comments  Teeth checked after intubation, no damage noted.    LMA placed initially to move from bed to [place] table, then patient intubated after LMA removed

## 2019-02-24 LAB
ANION GAP SERPL CALCULATED.3IONS-SCNC: 10.9 MMOL/L
BUN BLD-MCNC: 15 MG/DL (ref 8–23)
BUN/CREAT SERPL: 17.4 (ref 7–25)
CALCIUM SPEC-SCNC: 8.1 MG/DL (ref 8.6–10.5)
CHLORIDE SERPL-SCNC: 99 MMOL/L (ref 98–107)
CO2 SERPL-SCNC: 25.1 MMOL/L (ref 22–29)
CREAT BLD-MCNC: 0.86 MG/DL (ref 0.57–1)
DEPRECATED RDW RBC AUTO: 50.2 FL (ref 37–54)
ERYTHROCYTE [DISTWIDTH] IN BLOOD BY AUTOMATED COUNT: 14.3 % (ref 12.3–15.4)
GFR SERPL CREATININE-BSD FRML MDRD: 64 ML/MIN/1.73
GLUCOSE BLD-MCNC: 133 MG/DL (ref 65–99)
HCT VFR BLD AUTO: 33.4 % (ref 34–46.6)
HGB BLD-MCNC: 10.3 G/DL (ref 12–15.9)
MCH RBC QN AUTO: 29.5 PG (ref 26.6–33)
MCHC RBC AUTO-ENTMCNC: 30.8 G/DL (ref 31.5–35.7)
MCV RBC AUTO: 95.7 FL (ref 79–97)
PLATELET # BLD AUTO: 208 10*3/MM3 (ref 140–450)
PMV BLD AUTO: 10.7 FL (ref 6–12)
POTASSIUM BLD-SCNC: 4.3 MMOL/L (ref 3.5–5.2)
RBC # BLD AUTO: 3.49 10*6/MM3 (ref 3.77–5.28)
SODIUM BLD-SCNC: 135 MMOL/L (ref 136–145)
WBC NRBC COR # BLD: 9.86 10*3/MM3 (ref 3.4–10.8)

## 2019-02-24 PROCEDURE — 80048 BASIC METABOLIC PNL TOTAL CA: CPT | Performed by: ORTHOPAEDIC SURGERY

## 2019-02-24 PROCEDURE — 97110 THERAPEUTIC EXERCISES: CPT

## 2019-02-24 PROCEDURE — 97161 PT EVAL LOW COMPLEX 20 MIN: CPT

## 2019-02-24 PROCEDURE — 85027 COMPLETE CBC AUTOMATED: CPT | Performed by: ORTHOPAEDIC SURGERY

## 2019-02-24 PROCEDURE — 25010000003 CEFAZOLIN IN DEXTROSE 2-4 GM/100ML-% SOLUTION: Performed by: ORTHOPAEDIC SURGERY

## 2019-02-24 RX ADMIN — CALCIUM 500 MG: 500 TABLET ORAL at 09:34

## 2019-02-24 RX ADMIN — LETROZOLE 2.5 MG: 2.5 TABLET ORAL at 09:34

## 2019-02-24 RX ADMIN — LEVOTHYROXINE SODIUM 112 MCG: 0.11 TABLET ORAL at 05:13

## 2019-02-24 RX ADMIN — ASPIRIN 325 MG: 325 TABLET, DELAYED RELEASE ORAL at 09:34

## 2019-02-24 RX ADMIN — SODIUM CHLORIDE, PRESERVATIVE FREE 3 ML: 5 INJECTION INTRAVENOUS at 20:45

## 2019-02-24 RX ADMIN — SODIUM CHLORIDE 100 ML/HR: 9 INJECTION, SOLUTION INTRAVENOUS at 05:10

## 2019-02-24 RX ADMIN — HYDROCODONE BITARTRATE AND ACETAMINOPHEN 1 TABLET: 5; 325 TABLET ORAL at 05:13

## 2019-02-24 RX ADMIN — ATORVASTATIN CALCIUM 20 MG: 20 TABLET, FILM COATED ORAL at 20:45

## 2019-02-24 RX ADMIN — MULTIPLE VITAMINS W/ MINERALS TAB 1 TABLET: TAB at 09:34

## 2019-02-24 RX ADMIN — CHOLECALCIFEROL TAB 10 MCG (400 UNIT) 400 UNITS: 10 TAB at 09:34

## 2019-02-24 RX ADMIN — ASPIRIN 325 MG: 325 TABLET, DELAYED RELEASE ORAL at 20:45

## 2019-02-24 RX ADMIN — HYDROCODONE BITARTRATE AND ACETAMINOPHEN 1 TABLET: 5; 325 TABLET ORAL at 09:34

## 2019-02-24 RX ADMIN — SODIUM CHLORIDE, PRESERVATIVE FREE 3 ML: 5 INJECTION INTRAVENOUS at 09:00

## 2019-02-24 RX ADMIN — HYDROCODONE BITARTRATE AND ACETAMINOPHEN 1 TABLET: 5; 325 TABLET ORAL at 14:22

## 2019-02-24 RX ADMIN — CEFAZOLIN SODIUM 2 G: 2 INJECTION, SOLUTION INTRAVENOUS at 05:10

## 2019-02-25 PROBLEM — R50.9 FEVER: Status: ACTIVE | Noted: 2019-02-25

## 2019-02-25 LAB
HCT VFR BLD AUTO: 32.2 % (ref 34–46.6)
HGB BLD-MCNC: 10.1 G/DL (ref 12–15.9)

## 2019-02-25 PROCEDURE — 97110 THERAPEUTIC EXERCISES: CPT

## 2019-02-25 PROCEDURE — 85018 HEMOGLOBIN: CPT | Performed by: INTERNAL MEDICINE

## 2019-02-25 PROCEDURE — 97150 GROUP THERAPEUTIC PROCEDURES: CPT

## 2019-02-25 PROCEDURE — 85014 HEMATOCRIT: CPT | Performed by: INTERNAL MEDICINE

## 2019-02-25 RX ORDER — HYDROCODONE BITARTRATE AND ACETAMINOPHEN 5; 325 MG/1; MG/1
1 TABLET ORAL EVERY 4 HOURS PRN
Qty: 20 TABLET | Refills: 0 | Status: SHIPPED | OUTPATIENT
Start: 2019-02-25 | End: 2019-03-04

## 2019-02-25 RX ADMIN — HYDROCODONE BITARTRATE AND ACETAMINOPHEN 1 TABLET: 5; 325 TABLET ORAL at 05:56

## 2019-02-25 RX ADMIN — ATORVASTATIN CALCIUM 20 MG: 20 TABLET, FILM COATED ORAL at 22:08

## 2019-02-25 RX ADMIN — ASPIRIN 325 MG: 325 TABLET, DELAYED RELEASE ORAL at 08:34

## 2019-02-25 RX ADMIN — CALCIUM 500 MG: 500 TABLET ORAL at 08:34

## 2019-02-25 RX ADMIN — ASPIRIN 325 MG: 325 TABLET, DELAYED RELEASE ORAL at 22:08

## 2019-02-25 RX ADMIN — HYDROCODONE BITARTRATE AND ACETAMINOPHEN 1 TABLET: 5; 325 TABLET ORAL at 10:20

## 2019-02-25 RX ADMIN — SODIUM CHLORIDE, PRESERVATIVE FREE 3 ML: 5 INJECTION INTRAVENOUS at 22:07

## 2019-02-25 RX ADMIN — CHOLECALCIFEROL TAB 10 MCG (400 UNIT) 400 UNITS: 10 TAB at 08:34

## 2019-02-25 RX ADMIN — MULTIPLE VITAMINS W/ MINERALS TAB 1 TABLET: TAB at 08:34

## 2019-02-25 RX ADMIN — SODIUM CHLORIDE, PRESERVATIVE FREE 3 ML: 5 INJECTION INTRAVENOUS at 08:35

## 2019-02-25 RX ADMIN — HYDROCODONE BITARTRATE AND ACETAMINOPHEN 1 TABLET: 5; 325 TABLET ORAL at 17:55

## 2019-02-25 RX ADMIN — HYDROCODONE BITARTRATE AND ACETAMINOPHEN 1 TABLET: 5; 325 TABLET ORAL at 14:09

## 2019-02-25 RX ADMIN — LEVOTHYROXINE SODIUM 112 MCG: 0.11 TABLET ORAL at 05:56

## 2019-02-25 RX ADMIN — HYDROCODONE BITARTRATE AND ACETAMINOPHEN 1 TABLET: 5; 325 TABLET ORAL at 01:52

## 2019-02-25 RX ADMIN — LETROZOLE 2.5 MG: 2.5 TABLET ORAL at 08:34

## 2019-02-26 VITALS
HEIGHT: 62 IN | RESPIRATION RATE: 16 BRPM | OXYGEN SATURATION: 95 % | TEMPERATURE: 97.9 F | SYSTOLIC BLOOD PRESSURE: 101 MMHG | DIASTOLIC BLOOD PRESSURE: 58 MMHG | BODY MASS INDEX: 27.23 KG/M2 | WEIGHT: 148 LBS | HEART RATE: 92 BPM

## 2019-02-26 PROBLEM — R50.9 FEVER: Status: RESOLVED | Noted: 2019-02-25 | Resolved: 2019-02-26

## 2019-02-26 RX ORDER — PSEUDOEPHEDRINE HCL 30 MG
100 TABLET ORAL 2 TIMES DAILY PRN
Start: 2019-02-26 | End: 2019-03-10

## 2019-02-26 RX ORDER — BISACODYL 5 MG/1
10 TABLET, DELAYED RELEASE ORAL DAILY PRN
Start: 2019-02-26

## 2019-02-26 RX ORDER — ACETAMINOPHEN 325 MG/1
650 TABLET ORAL EVERY 4 HOURS PRN
Start: 2019-02-26

## 2019-02-26 RX ORDER — BISACODYL 10 MG
10 SUPPOSITORY, RECTAL RECTAL DAILY PRN
Status: ON HOLD
Start: 2019-02-26 | End: 2022-08-24

## 2019-02-26 RX ADMIN — HYDROCODONE BITARTRATE AND ACETAMINOPHEN 1 TABLET: 5; 325 TABLET ORAL at 05:30

## 2019-02-26 RX ADMIN — ATORVASTATIN CALCIUM 20 MG: 20 TABLET, FILM COATED ORAL at 08:00

## 2019-02-26 RX ADMIN — MULTIPLE VITAMINS W/ MINERALS TAB 1 TABLET: TAB at 08:00

## 2019-02-26 RX ADMIN — LETROZOLE 2.5 MG: 2.5 TABLET ORAL at 08:00

## 2019-02-26 RX ADMIN — LEVOTHYROXINE SODIUM 112 MCG: 0.11 TABLET ORAL at 05:30

## 2019-02-26 RX ADMIN — HYDROCODONE BITARTRATE AND ACETAMINOPHEN 1 TABLET: 5; 325 TABLET ORAL at 13:02

## 2019-02-26 RX ADMIN — ASPIRIN 325 MG: 325 TABLET, DELAYED RELEASE ORAL at 08:00

## 2019-02-26 RX ADMIN — CHOLECALCIFEROL TAB 10 MCG (400 UNIT) 400 UNITS: 10 TAB at 08:00

## 2019-02-26 RX ADMIN — SODIUM CHLORIDE, PRESERVATIVE FREE 3 ML: 5 INJECTION INTRAVENOUS at 08:00

## 2019-02-26 RX ADMIN — CALCIUM 500 MG: 500 TABLET ORAL at 08:00

## 2019-02-26 RX ADMIN — HYDROCODONE BITARTRATE AND ACETAMINOPHEN 1 TABLET: 5; 325 TABLET ORAL at 09:25

## 2022-08-24 ENCOUNTER — APPOINTMENT (OUTPATIENT)
Dept: GENERAL RADIOLOGY | Facility: HOSPITAL | Age: 82
End: 2022-08-24

## 2022-08-24 ENCOUNTER — APPOINTMENT (OUTPATIENT)
Dept: CT IMAGING | Facility: HOSPITAL | Age: 82
End: 2022-08-24

## 2022-08-24 ENCOUNTER — HOSPITAL ENCOUNTER (INPATIENT)
Facility: HOSPITAL | Age: 82
LOS: 9 days | Discharge: NURSING, REHAB OR LONG-TERM CARE | End: 2022-09-03
Attending: EMERGENCY MEDICINE | Admitting: HOSPITALIST

## 2022-08-24 DIAGNOSIS — R09.02 HYPOXIA: ICD-10-CM

## 2022-08-24 DIAGNOSIS — S70.02XA CONTUSION OF LEFT HIP, INITIAL ENCOUNTER: ICD-10-CM

## 2022-08-24 DIAGNOSIS — T79.6XXA TRAUMATIC RHABDOMYOLYSIS, INITIAL ENCOUNTER: Primary | ICD-10-CM

## 2022-08-24 PROBLEM — Z72.0 TOBACCO ABUSE: Chronic | Status: ACTIVE | Noted: 2022-08-24

## 2022-08-24 PROBLEM — D72.829 LEUKOCYTOSIS: Status: ACTIVE | Noted: 2022-08-24

## 2022-08-24 PROBLEM — F03.90 DEMENTIA WITHOUT BEHAVIORAL DISTURBANCE: Status: ACTIVE | Noted: 2022-08-24

## 2022-08-24 PROBLEM — R29.6 RECURRENT FALLS: Status: ACTIVE | Noted: 2022-08-24

## 2022-08-24 PROBLEM — M62.82 NON-TRAUMATIC RHABDOMYOLYSIS: Status: ACTIVE | Noted: 2022-08-24

## 2022-08-24 LAB
ALBUMIN SERPL-MCNC: 4.8 G/DL (ref 3.5–5.2)
ALBUMIN/GLOB SERPL: 1.5 G/DL
ALP SERPL-CCNC: 57 U/L (ref 39–117)
ALT SERPL W P-5'-P-CCNC: 41 U/L (ref 1–33)
ANION GAP SERPL CALCULATED.3IONS-SCNC: 13 MMOL/L (ref 5–15)
AST SERPL-CCNC: 139 U/L (ref 1–32)
BILIRUB SERPL-MCNC: 0.4 MG/DL (ref 0–1.2)
BUN SERPL-MCNC: 19 MG/DL (ref 8–23)
BUN/CREAT SERPL: 27.5 (ref 7–25)
CALCIUM SPEC-SCNC: 10.1 MG/DL (ref 8.6–10.5)
CHLORIDE SERPL-SCNC: 98 MMOL/L (ref 98–107)
CK SERPL-CCNC: 7551 U/L (ref 20–180)
CO2 SERPL-SCNC: 27 MMOL/L (ref 22–29)
CREAT SERPL-MCNC: 0.69 MG/DL (ref 0.57–1)
D-LACTATE SERPL-SCNC: 1.8 MMOL/L (ref 0.5–2)
DEPRECATED RDW RBC AUTO: 45.1 FL (ref 37–54)
EGFRCR SERPLBLD CKD-EPI 2021: 87.3 ML/MIN/1.73
ERYTHROCYTE [DISTWIDTH] IN BLOOD BY AUTOMATED COUNT: 13.7 % (ref 12.3–15.4)
FOLATE SERPL-MCNC: 18.4 NG/ML (ref 4.78–24.2)
GLOBULIN UR ELPH-MCNC: 3.1 GM/DL
GLUCOSE SERPL-MCNC: 111 MG/DL (ref 65–99)
HCT VFR BLD AUTO: 39.7 % (ref 34–46.6)
HGB BLD-MCNC: 13.5 G/DL (ref 12–15.9)
HOLD SPECIMEN: NORMAL
HOLD SPECIMEN: NORMAL
LYMPHOCYTES # BLD MANUAL: 0.89 10*3/MM3 (ref 0.7–3.1)
LYMPHOCYTES NFR BLD MANUAL: 28.3 % (ref 5–12)
MCH RBC QN AUTO: 30.8 PG (ref 26.6–33)
MCHC RBC AUTO-ENTMCNC: 34 G/DL (ref 31.5–35.7)
MCV RBC AUTO: 90.6 FL (ref 79–97)
MONOCYTES # BLD: 3.56 10*3/MM3 (ref 0.1–0.9)
NEUTROPHILS # BLD AUTO: 8.13 10*3/MM3 (ref 1.7–7)
NEUTROPHILS NFR BLD MANUAL: 64.6 % (ref 42.7–76)
NRBC BLD AUTO-RTO: 0 /100 WBC (ref 0–0.2)
PLAT MORPH BLD: NORMAL
PLATELET # BLD AUTO: 225 10*3/MM3 (ref 140–450)
PMV BLD AUTO: 10.3 FL (ref 6–12)
POTASSIUM SERPL-SCNC: 4.6 MMOL/L (ref 3.5–5.2)
PROCALCITONIN SERPL-MCNC: 0.25 NG/ML (ref 0–0.25)
PROT SERPL-MCNC: 7.9 G/DL (ref 6–8.5)
QT INTERVAL: 484 MS
RBC # BLD AUTO: 4.38 10*6/MM3 (ref 3.77–5.28)
RBC MORPH BLD: NORMAL
SARS-COV-2 RNA PNL SPEC NAA+PROBE: NOT DETECTED
SODIUM SERPL-SCNC: 138 MMOL/L (ref 136–145)
TROPONIN T SERPL-MCNC: <0.01 NG/ML (ref 0–0.03)
TROPONIN T SERPL-MCNC: <0.01 NG/ML (ref 0–0.03)
VARIANT LYMPHS NFR BLD MANUAL: 7.1 % (ref 19.6–45.3)
VIT B12 BLD-MCNC: 390 PG/ML (ref 211–946)
WBC MORPH BLD: NORMAL
WBC NRBC COR # BLD: 12.59 10*3/MM3 (ref 3.4–10.8)
WHOLE BLOOD HOLD COAG: NORMAL
WHOLE BLOOD HOLD SPECIMEN: NORMAL

## 2022-08-24 PROCEDURE — 71045 X-RAY EXAM CHEST 1 VIEW: CPT | Performed by: NURSE PRACTITIONER

## 2022-08-24 PROCEDURE — 82550 ASSAY OF CK (CPK): CPT | Performed by: NURSE PRACTITIONER

## 2022-08-24 PROCEDURE — P9612 CATHETERIZE FOR URINE SPEC: HCPCS

## 2022-08-24 PROCEDURE — 36415 COLL VENOUS BLD VENIPUNCTURE: CPT

## 2022-08-24 PROCEDURE — 82746 ASSAY OF FOLIC ACID SERUM: CPT | Performed by: HOSPITALIST

## 2022-08-24 PROCEDURE — 71275 CT ANGIOGRAPHY CHEST: CPT

## 2022-08-24 PROCEDURE — 85025 COMPLETE CBC W/AUTO DIFF WBC: CPT | Performed by: NURSE PRACTITIONER

## 2022-08-24 PROCEDURE — 72125 CT NECK SPINE W/O DYE: CPT

## 2022-08-24 PROCEDURE — 0 IOPAMIDOL PER 1 ML: Performed by: EMERGENCY MEDICINE

## 2022-08-24 PROCEDURE — 80053 COMPREHEN METABOLIC PANEL: CPT | Performed by: NURSE PRACTITIONER

## 2022-08-24 PROCEDURE — 87635 SARS-COV-2 COVID-19 AMP PRB: CPT | Performed by: EMERGENCY MEDICINE

## 2022-08-24 PROCEDURE — 87040 BLOOD CULTURE FOR BACTERIA: CPT | Performed by: NURSE PRACTITIONER

## 2022-08-24 PROCEDURE — 84484 ASSAY OF TROPONIN QUANT: CPT | Performed by: NURSE PRACTITIONER

## 2022-08-24 PROCEDURE — 93010 ELECTROCARDIOGRAM REPORT: CPT | Performed by: INTERNAL MEDICINE

## 2022-08-24 PROCEDURE — G0378 HOSPITAL OBSERVATION PER HR: HCPCS

## 2022-08-24 PROCEDURE — 73502 X-RAY EXAM HIP UNI 2-3 VIEWS: CPT

## 2022-08-24 PROCEDURE — 85007 BL SMEAR W/DIFF WBC COUNT: CPT | Performed by: NURSE PRACTITIONER

## 2022-08-24 PROCEDURE — 82607 VITAMIN B-12: CPT | Performed by: HOSPITALIST

## 2022-08-24 PROCEDURE — 93005 ELECTROCARDIOGRAM TRACING: CPT | Performed by: NURSE PRACTITIONER

## 2022-08-24 PROCEDURE — 84145 PROCALCITONIN (PCT): CPT | Performed by: NURSE PRACTITIONER

## 2022-08-24 PROCEDURE — 25010000002 ENOXAPARIN PER 10 MG: Performed by: HOSPITALIST

## 2022-08-24 PROCEDURE — 70450 CT HEAD/BRAIN W/O DYE: CPT

## 2022-08-24 PROCEDURE — 99285 EMERGENCY DEPT VISIT HI MDM: CPT

## 2022-08-24 PROCEDURE — 72110 X-RAY EXAM L-2 SPINE 4/>VWS: CPT

## 2022-08-24 PROCEDURE — 83605 ASSAY OF LACTIC ACID: CPT | Performed by: NURSE PRACTITIONER

## 2022-08-24 RX ORDER — BISACODYL 5 MG/1
5 TABLET, DELAYED RELEASE ORAL DAILY PRN
Status: DISCONTINUED | OUTPATIENT
Start: 2022-08-24 | End: 2022-09-03 | Stop reason: HOSPADM

## 2022-08-24 RX ORDER — OMEGA-3S/DHA/EPA/FISH OIL/D3 300MG-1000
400 CAPSULE ORAL DAILY
Status: DISCONTINUED | OUTPATIENT
Start: 2022-08-25 | End: 2022-09-03 | Stop reason: HOSPADM

## 2022-08-24 RX ORDER — BISACODYL 10 MG
10 SUPPOSITORY, RECTAL RECTAL DAILY PRN
Status: DISCONTINUED | OUTPATIENT
Start: 2022-08-24 | End: 2022-09-03 | Stop reason: HOSPADM

## 2022-08-24 RX ORDER — ASPIRIN 81 MG/1
324 TABLET, CHEWABLE ORAL ONCE
Status: DISCONTINUED | OUTPATIENT
Start: 2022-08-24 | End: 2022-09-03 | Stop reason: HOSPADM

## 2022-08-24 RX ORDER — SODIUM CHLORIDE 0.9 % (FLUSH) 0.9 %
10 SYRINGE (ML) INJECTION AS NEEDED
Status: DISCONTINUED | OUTPATIENT
Start: 2022-08-24 | End: 2022-09-03 | Stop reason: HOSPADM

## 2022-08-24 RX ORDER — SODIUM CHLORIDE 0.9 % (FLUSH) 0.9 %
10 SYRINGE (ML) INJECTION EVERY 12 HOURS SCHEDULED
Status: DISCONTINUED | OUTPATIENT
Start: 2022-08-24 | End: 2022-09-03 | Stop reason: HOSPADM

## 2022-08-24 RX ORDER — ENOXAPARIN SODIUM 100 MG/ML
40 INJECTION SUBCUTANEOUS DAILY
Status: DISCONTINUED | OUTPATIENT
Start: 2022-08-24 | End: 2022-08-25

## 2022-08-24 RX ORDER — ACETAMINOPHEN 325 MG/1
650 TABLET ORAL EVERY 4 HOURS PRN
Status: DISCONTINUED | OUTPATIENT
Start: 2022-08-24 | End: 2022-09-03 | Stop reason: HOSPADM

## 2022-08-24 RX ORDER — ONDANSETRON 2 MG/ML
4 INJECTION INTRAMUSCULAR; INTRAVENOUS EVERY 6 HOURS PRN
Status: DISCONTINUED | OUTPATIENT
Start: 2022-08-24 | End: 2022-09-03 | Stop reason: HOSPADM

## 2022-08-24 RX ORDER — NICOTINE 21 MG/24HR
1 PATCH, TRANSDERMAL 24 HOURS TRANSDERMAL EVERY 24 HOURS
Status: DISCONTINUED | OUTPATIENT
Start: 2022-08-24 | End: 2022-09-03 | Stop reason: HOSPADM

## 2022-08-24 RX ORDER — POLYETHYLENE GLYCOL 3350 17 G/17G
17 POWDER, FOR SOLUTION ORAL DAILY PRN
Status: DISCONTINUED | OUTPATIENT
Start: 2022-08-24 | End: 2022-09-03 | Stop reason: HOSPADM

## 2022-08-24 RX ORDER — LEVOTHYROXINE SODIUM 112 UG/1
112 TABLET ORAL DAILY
Status: DISCONTINUED | OUTPATIENT
Start: 2022-08-25 | End: 2022-08-25

## 2022-08-24 RX ORDER — AMOXICILLIN 250 MG
2 CAPSULE ORAL 2 TIMES DAILY
Status: DISCONTINUED | OUTPATIENT
Start: 2022-08-24 | End: 2022-09-03 | Stop reason: HOSPADM

## 2022-08-24 RX ORDER — ATORVASTATIN CALCIUM 20 MG/1
20 TABLET, FILM COATED ORAL DAILY
Status: DISCONTINUED | OUTPATIENT
Start: 2022-08-25 | End: 2022-09-03 | Stop reason: HOSPADM

## 2022-08-24 RX ORDER — SODIUM CHLORIDE 9 MG/ML
50 INJECTION, SOLUTION INTRAVENOUS CONTINUOUS
Status: DISCONTINUED | OUTPATIENT
Start: 2022-08-24 | End: 2022-08-29

## 2022-08-24 RX ADMIN — IOPAMIDOL 95 ML: 755 INJECTION, SOLUTION INTRAVENOUS at 18:45

## 2022-08-24 RX ADMIN — ENOXAPARIN SODIUM 40 MG: 100 INJECTION SUBCUTANEOUS at 23:09

## 2022-08-24 RX ADMIN — Medication 10 ML: at 22:36

## 2022-08-24 RX ADMIN — SODIUM CHLORIDE 1000 ML: 9 INJECTION, SOLUTION INTRAVENOUS at 19:15

## 2022-08-24 RX ADMIN — SODIUM CHLORIDE 100 ML/HR: 9 INJECTION, SOLUTION INTRAVENOUS at 22:34

## 2022-08-24 RX ADMIN — SODIUM CHLORIDE 1000 ML: 9 INJECTION, SOLUTION INTRAVENOUS at 17:46

## 2022-08-25 ENCOUNTER — APPOINTMENT (OUTPATIENT)
Dept: GENERAL RADIOLOGY | Facility: HOSPITAL | Age: 82
End: 2022-08-25

## 2022-08-25 ENCOUNTER — TELEPHONE (OUTPATIENT)
Dept: INTERVENTIONAL RADIOLOGY/VASCULAR | Facility: HOSPITAL | Age: 82
End: 2022-08-25

## 2022-08-25 ENCOUNTER — APPOINTMENT (OUTPATIENT)
Dept: CT IMAGING | Facility: HOSPITAL | Age: 82
End: 2022-08-25

## 2022-08-25 PROBLEM — J43.1 PANLOBULAR EMPHYSEMA (HCC): Status: ACTIVE | Noted: 2022-08-25

## 2022-08-25 PROBLEM — J96.01 ACUTE RESPIRATORY FAILURE WITH HYPOXIA (HCC): Status: ACTIVE | Noted: 2022-08-25

## 2022-08-25 PROBLEM — F01.50 VASCULAR DEMENTIA WITHOUT BEHAVIORAL DISTURBANCE: Status: ACTIVE | Noted: 2022-08-25

## 2022-08-25 PROBLEM — T79.6XXA TRAUMATIC RHABDOMYOLYSIS, INITIAL ENCOUNTER: Status: ACTIVE | Noted: 2022-08-25

## 2022-08-25 PROBLEM — R91.8 LUNG MASS: Status: ACTIVE | Noted: 2022-08-25

## 2022-08-25 PROBLEM — F23 BRIEF PSYCHOTIC DISORDER (HCC): Status: ACTIVE | Noted: 2022-08-25

## 2022-08-25 LAB
25(OH)D3 SERPL-MCNC: 33.9 NG/ML (ref 30–100)
ALBUMIN SERPL-MCNC: 3.2 G/DL (ref 3.5–5.2)
ALBUMIN/GLOB SERPL: 1.1 G/DL
ALP SERPL-CCNC: 44 U/L (ref 39–117)
ALT SERPL W P-5'-P-CCNC: 36 U/L (ref 1–33)
ANION GAP SERPL CALCULATED.3IONS-SCNC: 9.5 MMOL/L (ref 5–15)
AST SERPL-CCNC: 110 U/L (ref 1–32)
BACTERIA UR QL AUTO: ABNORMAL /HPF
BILIRUB SERPL-MCNC: 0.4 MG/DL (ref 0–1.2)
BILIRUB UR QL STRIP: NEGATIVE
BUN SERPL-MCNC: 16 MG/DL (ref 8–23)
BUN/CREAT SERPL: 24.6 (ref 7–25)
CALCIUM SPEC-SCNC: 8.1 MG/DL (ref 8.6–10.5)
CHLORIDE SERPL-SCNC: 107 MMOL/L (ref 98–107)
CK SERPL-CCNC: 4553 U/L (ref 20–180)
CLARITY UR: CLEAR
CO2 SERPL-SCNC: 22.5 MMOL/L (ref 22–29)
COLOR UR: YELLOW
CREAT SERPL-MCNC: 0.65 MG/DL (ref 0.57–1)
DEPRECATED RDW RBC AUTO: 47.6 FL (ref 37–54)
EGFRCR SERPLBLD CKD-EPI 2021: 88.6 ML/MIN/1.73
ERYTHROCYTE [DISTWIDTH] IN BLOOD BY AUTOMATED COUNT: 13.7 % (ref 12.3–15.4)
GLOBULIN UR ELPH-MCNC: 2.9 GM/DL
GLUCOSE SERPL-MCNC: 88 MG/DL (ref 65–99)
GLUCOSE UR STRIP-MCNC: NEGATIVE MG/DL
HCT VFR BLD AUTO: 35.4 % (ref 34–46.6)
HGB BLD-MCNC: 11.5 G/DL (ref 12–15.9)
HGB UR QL STRIP.AUTO: ABNORMAL
HYALINE CASTS UR QL AUTO: ABNORMAL /LPF
INR PPP: 1.19 (ref 0.9–1.1)
KETONES UR QL STRIP: ABNORMAL
LEUKOCYTE ESTERASE UR QL STRIP.AUTO: NEGATIVE
LYMPHOCYTES # BLD MANUAL: 1.19 10*3/MM3 (ref 0.7–3.1)
LYMPHOCYTES NFR BLD MANUAL: 22 % (ref 5–12)
MAGNESIUM SERPL-MCNC: 1.9 MG/DL (ref 1.6–2.4)
MCH RBC QN AUTO: 30.7 PG (ref 26.6–33)
MCHC RBC AUTO-ENTMCNC: 32.5 G/DL (ref 31.5–35.7)
MCV RBC AUTO: 94.4 FL (ref 79–97)
MONOCYTES # BLD: 1.64 10*3/MM3 (ref 0.1–0.9)
NEUTROPHILS # BLD AUTO: 4.62 10*3/MM3 (ref 1.7–7)
NEUTROPHILS NFR BLD MANUAL: 62 % (ref 42.7–76)
NITRITE UR QL STRIP: NEGATIVE
PH UR STRIP.AUTO: 6 [PH] (ref 5–8)
PHOSPHATE SERPL-MCNC: 2.7 MG/DL (ref 2.5–4.5)
PLAT MORPH BLD: NORMAL
PLATELET # BLD AUTO: 166 10*3/MM3 (ref 140–450)
PMV BLD AUTO: 10.6 FL (ref 6–12)
POTASSIUM SERPL-SCNC: 4.3 MMOL/L (ref 3.5–5.2)
PROT SERPL-MCNC: 6.1 G/DL (ref 6–8.5)
PROT UR QL STRIP: ABNORMAL
PROTHROMBIN TIME: 15 SECONDS (ref 11.7–14.2)
RBC # BLD AUTO: 3.75 10*6/MM3 (ref 3.77–5.28)
RBC # UR STRIP: ABNORMAL /HPF
RBC MORPH BLD: NORMAL
REF LAB TEST METHOD: ABNORMAL
SODIUM SERPL-SCNC: 139 MMOL/L (ref 136–145)
SP GR UR STRIP: >=1.03 (ref 1–1.03)
SQUAMOUS #/AREA URNS HPF: ABNORMAL /HPF
TSH SERPL DL<=0.05 MIU/L-ACNC: 11.5 UIU/ML (ref 0.27–4.2)
UROBILINOGEN UR QL STRIP: ABNORMAL
VARIANT LYMPHS NFR BLD MANUAL: 14 % (ref 19.6–45.3)
VARIANT LYMPHS NFR BLD MANUAL: 2 % (ref 0–5)
WBC # UR STRIP: ABNORMAL /HPF
WBC MORPH BLD: NORMAL
WBC NRBC COR # BLD: 7.45 10*3/MM3 (ref 3.4–10.8)

## 2022-08-25 PROCEDURE — 83735 ASSAY OF MAGNESIUM: CPT | Performed by: HOSPITALIST

## 2022-08-25 PROCEDURE — 0BBJ3ZX EXCISION OF LEFT LOWER LUNG LOBE, PERCUTANEOUS APPROACH, DIAGNOSTIC: ICD-10-PCS | Performed by: RADIOLOGY

## 2022-08-25 PROCEDURE — 80053 COMPREHEN METABOLIC PANEL: CPT | Performed by: HOSPITALIST

## 2022-08-25 PROCEDURE — 85025 COMPLETE CBC W/AUTO DIFF WBC: CPT | Performed by: HOSPITALIST

## 2022-08-25 PROCEDURE — 97110 THERAPEUTIC EXERCISES: CPT

## 2022-08-25 PROCEDURE — 97162 PT EVAL MOD COMPLEX 30 MIN: CPT

## 2022-08-25 PROCEDURE — 0 LIDOCAINE 1 % SOLUTION: Performed by: RADIOLOGY

## 2022-08-25 PROCEDURE — 85610 PROTHROMBIN TIME: CPT | Performed by: INTERNAL MEDICINE

## 2022-08-25 PROCEDURE — 71045 X-RAY EXAM CHEST 1 VIEW: CPT

## 2022-08-25 PROCEDURE — 82306 VITAMIN D 25 HYDROXY: CPT | Performed by: HOSPITALIST

## 2022-08-25 PROCEDURE — 88305 TISSUE EXAM BY PATHOLOGIST: CPT | Performed by: INTERNAL MEDICINE

## 2022-08-25 PROCEDURE — 85007 BL SMEAR W/DIFF WBC COUNT: CPT | Performed by: HOSPITALIST

## 2022-08-25 PROCEDURE — 25010000002 MIDAZOLAM PER 1 MG: Performed by: RADIOLOGY

## 2022-08-25 PROCEDURE — 82550 ASSAY OF CK (CPK): CPT | Performed by: HOSPITALIST

## 2022-08-25 PROCEDURE — 25010000002 FENTANYL CITRATE (PF) 50 MCG/ML SOLUTION: Performed by: RADIOLOGY

## 2022-08-25 PROCEDURE — 81001 URINALYSIS AUTO W/SCOPE: CPT | Performed by: HOSPITALIST

## 2022-08-25 PROCEDURE — 84443 ASSAY THYROID STIM HORMONE: CPT | Performed by: HOSPITALIST

## 2022-08-25 PROCEDURE — 84100 ASSAY OF PHOSPHORUS: CPT | Performed by: HOSPITALIST

## 2022-08-25 PROCEDURE — 99223 1ST HOSP IP/OBS HIGH 75: CPT | Performed by: PSYCHIATRY & NEUROLOGY

## 2022-08-25 PROCEDURE — 25010000002 ENOXAPARIN PER 10 MG: Performed by: INTERNAL MEDICINE

## 2022-08-25 RX ORDER — ENOXAPARIN SODIUM 100 MG/ML
40 INJECTION SUBCUTANEOUS EVERY 24 HOURS
Status: DISCONTINUED | OUTPATIENT
Start: 2022-08-25 | End: 2022-08-26

## 2022-08-25 RX ORDER — SODIUM CHLORIDE 9 MG/ML
INJECTION, SOLUTION INTRAVENOUS
Status: COMPLETED | OUTPATIENT
Start: 2022-08-25 | End: 2022-08-25

## 2022-08-25 RX ORDER — FENTANYL CITRATE 50 UG/ML
INJECTION, SOLUTION INTRAMUSCULAR; INTRAVENOUS
Status: COMPLETED | OUTPATIENT
Start: 2022-08-25 | End: 2022-08-25

## 2022-08-25 RX ORDER — LIDOCAINE HYDROCHLORIDE 10 MG/ML
20 INJECTION, SOLUTION INFILTRATION; PERINEURAL ONCE
Status: COMPLETED | OUTPATIENT
Start: 2022-08-25 | End: 2022-08-25

## 2022-08-25 RX ORDER — MIDAZOLAM HYDROCHLORIDE 1 MG/ML
INJECTION INTRAMUSCULAR; INTRAVENOUS
Status: COMPLETED | OUTPATIENT
Start: 2022-08-25 | End: 2022-08-25

## 2022-08-25 RX ORDER — LEVOTHYROXINE SODIUM 0.12 MG/1
125 TABLET ORAL
Status: DISCONTINUED | OUTPATIENT
Start: 2022-08-26 | End: 2022-09-03 | Stop reason: HOSPADM

## 2022-08-25 RX ADMIN — SODIUM CHLORIDE 30 ML/HR: 9 INJECTION, SOLUTION INTRAVENOUS at 14:04

## 2022-08-25 RX ADMIN — LEVOTHYROXINE SODIUM 112 MCG: 0.11 TABLET ORAL at 05:50

## 2022-08-25 RX ADMIN — Medication 10 ML: at 20:37

## 2022-08-25 RX ADMIN — ATORVASTATIN CALCIUM 20 MG: 20 TABLET, FILM COATED ORAL at 09:25

## 2022-08-25 RX ADMIN — SODIUM CHLORIDE 100 ML/HR: 9 INJECTION, SOLUTION INTRAVENOUS at 09:25

## 2022-08-25 RX ADMIN — Medication 10 ML: at 09:25

## 2022-08-25 RX ADMIN — FENTANYL CITRATE 25 MCG: 50 INJECTION INTRAMUSCULAR; INTRAVENOUS at 14:11

## 2022-08-25 RX ADMIN — Medication 10 ML: at 13:27

## 2022-08-25 RX ADMIN — ENOXAPARIN SODIUM 40 MG: 100 INJECTION SUBCUTANEOUS at 20:35

## 2022-08-25 RX ADMIN — DOCUSATE SODIUM 50MG AND SENNOSIDES 8.6MG 2 TABLET: 8.6; 5 TABLET, FILM COATED ORAL at 09:25

## 2022-08-25 RX ADMIN — LIDOCAINE HYDROCHLORIDE 20 ML: 10 INJECTION, SOLUTION INFILTRATION; PERINEURAL at 14:14

## 2022-08-25 RX ADMIN — MIDAZOLAM 1 MG: 1 INJECTION INTRAMUSCULAR; INTRAVENOUS at 14:11

## 2022-08-25 RX ADMIN — NICOTINE 1 PATCH: 14 PATCH, EXTENDED RELEASE TRANSDERMAL at 23:47

## 2022-08-25 RX ADMIN — CHOLECALCIFEROL TAB 10 MCG (400 UNIT) 400 UNITS: 10 TAB at 09:25

## 2022-08-25 RX ADMIN — SODIUM CHLORIDE 100 ML/HR: 9 INJECTION, SOLUTION INTRAVENOUS at 23:51

## 2022-08-25 RX ADMIN — Medication 10 ML: at 14:04

## 2022-08-26 ENCOUNTER — APPOINTMENT (OUTPATIENT)
Dept: MRI IMAGING | Facility: HOSPITAL | Age: 82
End: 2022-08-26

## 2022-08-26 PROBLEM — G31.9 CEREBRAL ATROPHY (HCC): Status: ACTIVE | Noted: 2022-08-26

## 2022-08-26 PROBLEM — I67.9 SMALL VESSEL DISEASE, CEREBROVASCULAR: Status: ACTIVE | Noted: 2022-08-26

## 2022-08-26 PROBLEM — S00.03XA SCALP HEMATOMA, INITIAL ENCOUNTER: Status: ACTIVE | Noted: 2022-08-26

## 2022-08-26 LAB
ANION GAP SERPL CALCULATED.3IONS-SCNC: 9 MMOL/L (ref 5–15)
BUN SERPL-MCNC: 9 MG/DL (ref 8–23)
BUN/CREAT SERPL: 15.5 (ref 7–25)
CALCIUM SPEC-SCNC: 8.2 MG/DL (ref 8.6–10.5)
CHLORIDE SERPL-SCNC: 103 MMOL/L (ref 98–107)
CK SERPL-CCNC: 4308 U/L (ref 20–180)
CO2 SERPL-SCNC: 27 MMOL/L (ref 22–29)
CREAT SERPL-MCNC: 0.58 MG/DL (ref 0.57–1)
DEPRECATED RDW RBC AUTO: 46.9 FL (ref 37–54)
EGFRCR SERPLBLD CKD-EPI 2021: 91 ML/MIN/1.73
EOSINOPHIL # BLD MANUAL: 0.04 10*3/MM3 (ref 0–0.4)
EOSINOPHIL NFR BLD MANUAL: 1 % (ref 0.3–6.2)
ERYTHROCYTE [DISTWIDTH] IN BLOOD BY AUTOMATED COUNT: 13.6 % (ref 12.3–15.4)
GLUCOSE SERPL-MCNC: 83 MG/DL (ref 65–99)
HCT VFR BLD AUTO: 32.7 % (ref 34–46.6)
HGB BLD-MCNC: 10.5 G/DL (ref 12–15.9)
HYPOCHROMIA BLD QL: ABNORMAL
LAB AP CASE REPORT: NORMAL
LYMPHOCYTES # BLD MANUAL: 0.56 10*3/MM3 (ref 0.7–3.1)
LYMPHOCYTES NFR BLD MANUAL: 26 % (ref 5–12)
MCH RBC QN AUTO: 30.4 PG (ref 26.6–33)
MCHC RBC AUTO-ENTMCNC: 32.1 G/DL (ref 31.5–35.7)
MCV RBC AUTO: 94.8 FL (ref 79–97)
MONOCYTES # BLD: 1.12 10*3/MM3 (ref 0.1–0.9)
NEUTROPHILS # BLD AUTO: 2.59 10*3/MM3 (ref 1.7–7)
NEUTROPHILS NFR BLD MANUAL: 60 % (ref 42.7–76)
NRBC BLD AUTO-RTO: 0 /100 WBC (ref 0–0.2)
NRBC SPEC MANUAL: 1 /100 WBC (ref 0–0.2)
PATH REPORT.FINAL DX SPEC: NORMAL
PATH REPORT.GROSS SPEC: NORMAL
PLAT MORPH BLD: NORMAL
PLATELET # BLD AUTO: 149 10*3/MM3 (ref 140–450)
PMV BLD AUTO: 10.3 FL (ref 6–12)
POTASSIUM SERPL-SCNC: 4 MMOL/L (ref 3.5–5.2)
RBC # BLD AUTO: 3.45 10*6/MM3 (ref 3.77–5.28)
SODIUM SERPL-SCNC: 139 MMOL/L (ref 136–145)
VARIANT LYMPHS NFR BLD MANUAL: 13 % (ref 19.6–45.3)
WBC MORPH BLD: NORMAL
WBC NRBC COR # BLD: 4.32 10*3/MM3 (ref 3.4–10.8)

## 2022-08-26 PROCEDURE — 99231 SBSQ HOSP IP/OBS SF/LOW 25: CPT | Performed by: PSYCHIATRY & NEUROLOGY

## 2022-08-26 PROCEDURE — 97110 THERAPEUTIC EXERCISES: CPT

## 2022-08-26 PROCEDURE — A9577 INJ MULTIHANCE: HCPCS | Performed by: INTERNAL MEDICINE

## 2022-08-26 PROCEDURE — 82550 ASSAY OF CK (CPK): CPT | Performed by: INTERNAL MEDICINE

## 2022-08-26 PROCEDURE — 99222 1ST HOSP IP/OBS MODERATE 55: CPT | Performed by: INTERNAL MEDICINE

## 2022-08-26 PROCEDURE — 85007 BL SMEAR W/DIFF WBC COUNT: CPT | Performed by: INTERNAL MEDICINE

## 2022-08-26 PROCEDURE — 85025 COMPLETE CBC W/AUTO DIFF WBC: CPT | Performed by: INTERNAL MEDICINE

## 2022-08-26 PROCEDURE — 80048 BASIC METABOLIC PNL TOTAL CA: CPT | Performed by: INTERNAL MEDICINE

## 2022-08-26 PROCEDURE — 70553 MRI BRAIN STEM W/O & W/DYE: CPT

## 2022-08-26 PROCEDURE — 0 GADOBENATE DIMEGLUMINE 529 MG/ML SOLUTION: Performed by: INTERNAL MEDICINE

## 2022-08-26 RX ADMIN — ZINC OXIDE 1 APPLICATION: 200 OINTMENT TOPICAL at 20:45

## 2022-08-26 RX ADMIN — ACETAMINOPHEN 650 MG: 325 TABLET, FILM COATED ORAL at 09:43

## 2022-08-26 RX ADMIN — CHOLECALCIFEROL TAB 10 MCG (400 UNIT) 400 UNITS: 10 TAB at 09:43

## 2022-08-26 RX ADMIN — ATORVASTATIN CALCIUM 20 MG: 20 TABLET, FILM COATED ORAL at 09:42

## 2022-08-26 RX ADMIN — Medication 10 ML: at 10:00

## 2022-08-26 RX ADMIN — LEVOTHYROXINE SODIUM 125 MCG: 0.12 TABLET ORAL at 06:33

## 2022-08-26 RX ADMIN — SODIUM CHLORIDE 50 ML/HR: 9 INJECTION, SOLUTION INTRAVENOUS at 17:11

## 2022-08-26 RX ADMIN — DOCUSATE SODIUM 50MG AND SENNOSIDES 8.6MG 2 TABLET: 8.6; 5 TABLET, FILM COATED ORAL at 09:43

## 2022-08-26 RX ADMIN — Medication 10 ML: at 20:46

## 2022-08-26 RX ADMIN — GADOBENATE DIMEGLUMINE 14 ML: 529 INJECTION, SOLUTION INTRAVENOUS at 11:16

## 2022-08-27 LAB
ANION GAP SERPL CALCULATED.3IONS-SCNC: 9.5 MMOL/L (ref 5–15)
BUN SERPL-MCNC: 6 MG/DL (ref 8–23)
BUN/CREAT SERPL: 10.5 (ref 7–25)
CALCIUM SPEC-SCNC: 8.2 MG/DL (ref 8.6–10.5)
CHLORIDE SERPL-SCNC: 101 MMOL/L (ref 98–107)
CK SERPL-CCNC: 6002 U/L (ref 20–180)
CO2 SERPL-SCNC: 28.5 MMOL/L (ref 22–29)
CREAT SERPL-MCNC: 0.57 MG/DL (ref 0.57–1)
EGFRCR SERPLBLD CKD-EPI 2021: 91.4 ML/MIN/1.73
GLUCOSE BLDC GLUCOMTR-MCNC: 105 MG/DL (ref 70–130)
GLUCOSE SERPL-MCNC: 124 MG/DL (ref 65–99)
POTASSIUM SERPL-SCNC: 3.7 MMOL/L (ref 3.5–5.2)
SODIUM SERPL-SCNC: 139 MMOL/L (ref 136–145)

## 2022-08-27 PROCEDURE — 82550 ASSAY OF CK (CPK): CPT | Performed by: INTERNAL MEDICINE

## 2022-08-27 PROCEDURE — 82962 GLUCOSE BLOOD TEST: CPT

## 2022-08-27 PROCEDURE — 94799 UNLISTED PULMONARY SVC/PX: CPT

## 2022-08-27 PROCEDURE — 94761 N-INVAS EAR/PLS OXIMETRY MLT: CPT

## 2022-08-27 PROCEDURE — 94640 AIRWAY INHALATION TREATMENT: CPT

## 2022-08-27 PROCEDURE — 99232 SBSQ HOSP IP/OBS MODERATE 35: CPT | Performed by: INTERNAL MEDICINE

## 2022-08-27 PROCEDURE — 80048 BASIC METABOLIC PNL TOTAL CA: CPT | Performed by: INTERNAL MEDICINE

## 2022-08-27 RX ORDER — DONEPEZIL HYDROCHLORIDE 5 MG/1
5 TABLET, FILM COATED ORAL DAILY
Status: DISCONTINUED | OUTPATIENT
Start: 2022-08-27 | End: 2022-09-03 | Stop reason: HOSPADM

## 2022-08-27 RX ORDER — IPRATROPIUM BROMIDE AND ALBUTEROL SULFATE 2.5; .5 MG/3ML; MG/3ML
3 SOLUTION RESPIRATORY (INHALATION) EVERY 4 HOURS PRN
Status: DISCONTINUED | OUTPATIENT
Start: 2022-08-27 | End: 2022-09-03 | Stop reason: HOSPADM

## 2022-08-27 RX ORDER — DONEPEZIL HYDROCHLORIDE 5 MG/1
5 TABLET, FILM COATED ORAL DAILY
COMMUNITY
Start: 2022-08-13

## 2022-08-27 RX ORDER — BUDESONIDE AND FORMOTEROL FUMARATE DIHYDRATE 160; 4.5 UG/1; UG/1
2 AEROSOL RESPIRATORY (INHALATION)
Status: DISCONTINUED | OUTPATIENT
Start: 2022-08-27 | End: 2022-09-03 | Stop reason: HOSPADM

## 2022-08-27 RX ORDER — BUDESONIDE AND FORMOTEROL FUMARATE DIHYDRATE 160; 4.5 UG/1; UG/1
2 AEROSOL RESPIRATORY (INHALATION)
Status: DISCONTINUED | OUTPATIENT
Start: 2022-08-27 | End: 2022-08-27

## 2022-08-27 RX ADMIN — LEVOTHYROXINE SODIUM 125 MCG: 0.12 TABLET ORAL at 09:56

## 2022-08-27 RX ADMIN — DONEPEZIL HYDROCHLORIDE 5 MG: 5 TABLET, FILM COATED ORAL at 13:13

## 2022-08-27 RX ADMIN — SODIUM CHLORIDE 50 ML/HR: 9 INJECTION, SOLUTION INTRAVENOUS at 15:33

## 2022-08-27 RX ADMIN — Medication 10 ML: at 20:26

## 2022-08-27 RX ADMIN — CHOLECALCIFEROL TAB 10 MCG (400 UNIT) 400 UNITS: 10 TAB at 09:56

## 2022-08-27 RX ADMIN — ATORVASTATIN CALCIUM 20 MG: 20 TABLET, FILM COATED ORAL at 09:56

## 2022-08-27 RX ADMIN — Medication 10 ML: at 09:56

## 2022-08-27 RX ADMIN — ZINC OXIDE 1 APPLICATION: 200 OINTMENT TOPICAL at 09:56

## 2022-08-27 RX ADMIN — ZINC OXIDE 1 APPLICATION: 200 OINTMENT TOPICAL at 20:26

## 2022-08-27 RX ADMIN — BUDESONIDE AND FORMOTEROL FUMARATE DIHYDRATE 2 PUFF: 160; 4.5 AEROSOL RESPIRATORY (INHALATION) at 20:12

## 2022-08-28 ENCOUNTER — APPOINTMENT (OUTPATIENT)
Dept: NUCLEAR MEDICINE | Facility: HOSPITAL | Age: 82
End: 2022-08-28

## 2022-08-28 ENCOUNTER — APPOINTMENT (OUTPATIENT)
Dept: CT IMAGING | Facility: HOSPITAL | Age: 82
End: 2022-08-28

## 2022-08-28 LAB
ALBUMIN SERPL-MCNC: 3.3 G/DL (ref 3.5–5.2)
ALBUMIN/GLOB SERPL: 1.4 G/DL
ALP SERPL-CCNC: 38 U/L (ref 39–117)
ALT SERPL W P-5'-P-CCNC: 57 U/L (ref 1–33)
ANION GAP SERPL CALCULATED.3IONS-SCNC: 8 MMOL/L (ref 5–15)
AST SERPL-CCNC: 170 U/L (ref 1–32)
BASOPHILS # BLD MANUAL: 0.05 10*3/MM3 (ref 0–0.2)
BASOPHILS NFR BLD MANUAL: 1 % (ref 0–1.5)
BILIRUB SERPL-MCNC: 0.4 MG/DL (ref 0–1.2)
BUN SERPL-MCNC: 4 MG/DL (ref 8–23)
BUN/CREAT SERPL: 7.8 (ref 7–25)
CALCIUM SPEC-SCNC: 8.1 MG/DL (ref 8.6–10.5)
CHLORIDE SERPL-SCNC: 101 MMOL/L (ref 98–107)
CK SERPL-CCNC: 5181 U/L (ref 20–180)
CO2 SERPL-SCNC: 31 MMOL/L (ref 22–29)
CREAT SERPL-MCNC: 0.51 MG/DL (ref 0.57–1)
DEPRECATED RDW RBC AUTO: 44.1 FL (ref 37–54)
EGFRCR SERPLBLD CKD-EPI 2021: 93.9 ML/MIN/1.73
EOSINOPHIL # BLD MANUAL: 0.05 10*3/MM3 (ref 0–0.4)
EOSINOPHIL NFR BLD MANUAL: 1 % (ref 0.3–6.2)
ERYTHROCYTE [DISTWIDTH] IN BLOOD BY AUTOMATED COUNT: 13.1 % (ref 12.3–15.4)
GLOBULIN UR ELPH-MCNC: 2.3 GM/DL
GLUCOSE SERPL-MCNC: 86 MG/DL (ref 65–99)
HCT VFR BLD AUTO: 30.2 % (ref 34–46.6)
HGB BLD-MCNC: 10 G/DL (ref 12–15.9)
LYMPHOCYTES # BLD MANUAL: 0.76 10*3/MM3 (ref 0.7–3.1)
LYMPHOCYTES NFR BLD MANUAL: 23 % (ref 5–12)
MCH RBC QN AUTO: 30.8 PG (ref 26.6–33)
MCHC RBC AUTO-ENTMCNC: 33.1 G/DL (ref 31.5–35.7)
MCV RBC AUTO: 92.9 FL (ref 79–97)
MONOCYTES # BLD: 1.09 10*3/MM3 (ref 0.1–0.9)
NEUTROPHILS # BLD AUTO: 2.79 10*3/MM3 (ref 1.7–7)
NEUTROPHILS NFR BLD MANUAL: 59 % (ref 42.7–76)
PLAT MORPH BLD: NORMAL
PLATELET # BLD AUTO: 155 10*3/MM3 (ref 140–450)
PMV BLD AUTO: 10.1 FL (ref 6–12)
POTASSIUM SERPL-SCNC: 3.8 MMOL/L (ref 3.5–5.2)
PROT SERPL-MCNC: 5.6 G/DL (ref 6–8.5)
RBC # BLD AUTO: 3.25 10*6/MM3 (ref 3.77–5.28)
RBC MORPH BLD: NORMAL
SODIUM SERPL-SCNC: 140 MMOL/L (ref 136–145)
VARIANT LYMPHS NFR BLD MANUAL: 16 % (ref 19.6–45.3)
WBC MORPH BLD: NORMAL
WBC NRBC COR # BLD: 4.73 10*3/MM3 (ref 3.4–10.8)

## 2022-08-28 PROCEDURE — 94799 UNLISTED PULMONARY SVC/PX: CPT

## 2022-08-28 PROCEDURE — 25010000002 IOPAMIDOL 61 % SOLUTION: Performed by: INTERNAL MEDICINE

## 2022-08-28 PROCEDURE — 94664 DEMO&/EVAL PT USE INHALER: CPT

## 2022-08-28 PROCEDURE — 74177 CT ABD & PELVIS W/CONTRAST: CPT

## 2022-08-28 PROCEDURE — A9503 TC99M MEDRONATE: HCPCS | Performed by: INTERNAL MEDICINE

## 2022-08-28 PROCEDURE — 80053 COMPREHEN METABOLIC PANEL: CPT | Performed by: INTERNAL MEDICINE

## 2022-08-28 PROCEDURE — 0 DIATRIZOATE MEGLUMINE & SODIUM PER 1 ML: Performed by: INTERNAL MEDICINE

## 2022-08-28 PROCEDURE — 82550 ASSAY OF CK (CPK): CPT | Performed by: INTERNAL MEDICINE

## 2022-08-28 PROCEDURE — 85007 BL SMEAR W/DIFF WBC COUNT: CPT | Performed by: INTERNAL MEDICINE

## 2022-08-28 PROCEDURE — 85027 COMPLETE CBC AUTOMATED: CPT | Performed by: INTERNAL MEDICINE

## 2022-08-28 PROCEDURE — 78306 BONE IMAGING WHOLE BODY: CPT

## 2022-08-28 PROCEDURE — 0 TECHNETIUM MEDRONATE KIT: Performed by: INTERNAL MEDICINE

## 2022-08-28 RX ORDER — TC 99M MEDRONATE 20 MG/10ML
21.6 INJECTION, POWDER, LYOPHILIZED, FOR SOLUTION INTRAVENOUS
Status: COMPLETED | OUTPATIENT
Start: 2022-08-28 | End: 2022-08-28

## 2022-08-28 RX ADMIN — ACETAMINOPHEN 650 MG: 325 TABLET, FILM COATED ORAL at 08:19

## 2022-08-28 RX ADMIN — ACETAMINOPHEN 650 MG: 325 TABLET, FILM COATED ORAL at 20:46

## 2022-08-28 RX ADMIN — Medication 21.6 MILLICURIE: at 10:20

## 2022-08-28 RX ADMIN — Medication 10 ML: at 20:46

## 2022-08-28 RX ADMIN — SODIUM CHLORIDE 50 ML/HR: 9 INJECTION, SOLUTION INTRAVENOUS at 18:50

## 2022-08-28 RX ADMIN — ZINC OXIDE 1 APPLICATION: 200 OINTMENT TOPICAL at 20:47

## 2022-08-28 RX ADMIN — ZINC OXIDE 1 APPLICATION: 200 OINTMENT TOPICAL at 08:19

## 2022-08-28 RX ADMIN — DIATRIZOATE MEGLUMINE AND DIATRIZOATE SODIUM 30 ML: 660; 100 LIQUID ORAL; RECTAL at 08:47

## 2022-08-28 RX ADMIN — BUDESONIDE AND FORMOTEROL FUMARATE DIHYDRATE 2 PUFF: 160; 4.5 AEROSOL RESPIRATORY (INHALATION) at 19:53

## 2022-08-28 RX ADMIN — ACETAMINOPHEN 650 MG: 325 TABLET, FILM COATED ORAL at 00:49

## 2022-08-28 RX ADMIN — LEVOTHYROXINE SODIUM 125 MCG: 0.12 TABLET ORAL at 05:59

## 2022-08-28 RX ADMIN — IOPAMIDOL 85 ML: 612 INJECTION, SOLUTION INTRAVENOUS at 10:46

## 2022-08-28 RX ADMIN — CHOLECALCIFEROL TAB 10 MCG (400 UNIT) 400 UNITS: 10 TAB at 08:19

## 2022-08-28 RX ADMIN — DONEPEZIL HYDROCHLORIDE 5 MG: 5 TABLET, FILM COATED ORAL at 08:19

## 2022-08-28 RX ADMIN — ATORVASTATIN CALCIUM 20 MG: 20 TABLET, FILM COATED ORAL at 08:19

## 2022-08-28 RX ADMIN — BUDESONIDE AND FORMOTEROL FUMARATE DIHYDRATE 2 PUFF: 160; 4.5 AEROSOL RESPIRATORY (INHALATION) at 09:24

## 2022-08-29 LAB
ANION GAP SERPL CALCULATED.3IONS-SCNC: 8.4 MMOL/L (ref 5–15)
BACTERIA SPEC AEROBE CULT: NORMAL
BUN SERPL-MCNC: 6 MG/DL (ref 8–23)
BUN/CREAT SERPL: 11.8 (ref 7–25)
CALCIUM SPEC-SCNC: 8.4 MG/DL (ref 8.6–10.5)
CHLORIDE SERPL-SCNC: 97 MMOL/L (ref 98–107)
CK SERPL-CCNC: 3151 U/L (ref 20–180)
CO2 SERPL-SCNC: 30.6 MMOL/L (ref 22–29)
CREAT SERPL-MCNC: 0.51 MG/DL (ref 0.57–1)
EGFRCR SERPLBLD CKD-EPI 2021: 93.9 ML/MIN/1.73
GLUCOSE SERPL-MCNC: 98 MG/DL (ref 65–99)
POTASSIUM SERPL-SCNC: 3.7 MMOL/L (ref 3.5–5.2)
SODIUM SERPL-SCNC: 136 MMOL/L (ref 136–145)

## 2022-08-29 PROCEDURE — 99233 SBSQ HOSP IP/OBS HIGH 50: CPT | Performed by: INTERNAL MEDICINE

## 2022-08-29 PROCEDURE — 97110 THERAPEUTIC EXERCISES: CPT

## 2022-08-29 PROCEDURE — 94799 UNLISTED PULMONARY SVC/PX: CPT

## 2022-08-29 PROCEDURE — 80048 BASIC METABOLIC PNL TOTAL CA: CPT | Performed by: INTERNAL MEDICINE

## 2022-08-29 PROCEDURE — 82550 ASSAY OF CK (CPK): CPT | Performed by: INTERNAL MEDICINE

## 2022-08-29 PROCEDURE — 94664 DEMO&/EVAL PT USE INHALER: CPT

## 2022-08-29 PROCEDURE — 94761 N-INVAS EAR/PLS OXIMETRY MLT: CPT

## 2022-08-29 RX ORDER — AMLODIPINE BESYLATE 2.5 MG/1
2.5 TABLET ORAL
Status: DISCONTINUED | OUTPATIENT
Start: 2022-08-29 | End: 2022-09-03 | Stop reason: HOSPADM

## 2022-08-29 RX ADMIN — DOCUSATE SODIUM 50MG AND SENNOSIDES 8.6MG 2 TABLET: 8.6; 5 TABLET, FILM COATED ORAL at 09:54

## 2022-08-29 RX ADMIN — DOCUSATE SODIUM 50MG AND SENNOSIDES 8.6MG 2 TABLET: 8.6; 5 TABLET, FILM COATED ORAL at 21:41

## 2022-08-29 RX ADMIN — ACETAMINOPHEN 650 MG: 325 TABLET, FILM COATED ORAL at 03:10

## 2022-08-29 RX ADMIN — NICOTINE 1 PATCH: 14 PATCH, EXTENDED RELEASE TRANSDERMAL at 21:41

## 2022-08-29 RX ADMIN — ZINC OXIDE 1 APPLICATION: 200 OINTMENT TOPICAL at 21:48

## 2022-08-29 RX ADMIN — ATORVASTATIN CALCIUM 20 MG: 20 TABLET, FILM COATED ORAL at 09:54

## 2022-08-29 RX ADMIN — CHOLECALCIFEROL TAB 10 MCG (400 UNIT) 400 UNITS: 10 TAB at 09:54

## 2022-08-29 RX ADMIN — Medication 10 ML: at 21:42

## 2022-08-29 RX ADMIN — BUDESONIDE AND FORMOTEROL FUMARATE DIHYDRATE 2 PUFF: 160; 4.5 AEROSOL RESPIRATORY (INHALATION) at 19:57

## 2022-08-29 RX ADMIN — ACETAMINOPHEN 650 MG: 325 TABLET, FILM COATED ORAL at 14:24

## 2022-08-29 RX ADMIN — Medication 10 ML: at 09:55

## 2022-08-29 RX ADMIN — BUDESONIDE AND FORMOTEROL FUMARATE DIHYDRATE 2 PUFF: 160; 4.5 AEROSOL RESPIRATORY (INHALATION) at 09:34

## 2022-08-29 RX ADMIN — ZINC OXIDE 1 APPLICATION: 200 OINTMENT TOPICAL at 09:55

## 2022-08-29 RX ADMIN — DONEPEZIL HYDROCHLORIDE 5 MG: 5 TABLET, FILM COATED ORAL at 09:54

## 2022-08-29 RX ADMIN — AMLODIPINE BESYLATE 2.5 MG: 2.5 TABLET ORAL at 14:24

## 2022-08-29 RX ADMIN — LEVOTHYROXINE SODIUM 125 MCG: 0.12 TABLET ORAL at 06:28

## 2022-08-30 LAB
ANION GAP SERPL CALCULATED.3IONS-SCNC: 7 MMOL/L (ref 5–15)
BUN SERPL-MCNC: 7 MG/DL (ref 8–23)
BUN/CREAT SERPL: 14 (ref 7–25)
CALCIUM SPEC-SCNC: 8.5 MG/DL (ref 8.6–10.5)
CHLORIDE SERPL-SCNC: 97 MMOL/L (ref 98–107)
CK SERPL-CCNC: 1878 U/L (ref 20–180)
CO2 SERPL-SCNC: 33 MMOL/L (ref 22–29)
CREAT SERPL-MCNC: 0.5 MG/DL (ref 0.57–1)
EGFRCR SERPLBLD CKD-EPI 2021: 94.4 ML/MIN/1.73
GLUCOSE SERPL-MCNC: 110 MG/DL (ref 65–99)
POTASSIUM SERPL-SCNC: 3.5 MMOL/L (ref 3.5–5.2)
SODIUM SERPL-SCNC: 137 MMOL/L (ref 136–145)

## 2022-08-30 PROCEDURE — 94761 N-INVAS EAR/PLS OXIMETRY MLT: CPT

## 2022-08-30 PROCEDURE — 80048 BASIC METABOLIC PNL TOTAL CA: CPT | Performed by: INTERNAL MEDICINE

## 2022-08-30 PROCEDURE — 97110 THERAPEUTIC EXERCISES: CPT

## 2022-08-30 PROCEDURE — 94799 UNLISTED PULMONARY SVC/PX: CPT

## 2022-08-30 PROCEDURE — 25010000002 ONDANSETRON PER 1 MG: Performed by: HOSPITALIST

## 2022-08-30 PROCEDURE — 94664 DEMO&/EVAL PT USE INHALER: CPT

## 2022-08-30 PROCEDURE — 82550 ASSAY OF CK (CPK): CPT | Performed by: INTERNAL MEDICINE

## 2022-08-30 RX ORDER — POTASSIUM CHLORIDE 750 MG/1
40 TABLET, FILM COATED, EXTENDED RELEASE ORAL AS NEEDED
Status: DISCONTINUED | OUTPATIENT
Start: 2022-08-30 | End: 2022-09-03 | Stop reason: HOSPADM

## 2022-08-30 RX ORDER — POTASSIUM CHLORIDE 7.45 MG/ML
10 INJECTION INTRAVENOUS
Status: DISCONTINUED | OUTPATIENT
Start: 2022-08-30 | End: 2022-09-03 | Stop reason: HOSPADM

## 2022-08-30 RX ORDER — POTASSIUM CHLORIDE 1.5 G/1.77G
40 POWDER, FOR SOLUTION ORAL AS NEEDED
Status: DISCONTINUED | OUTPATIENT
Start: 2022-08-30 | End: 2022-09-03 | Stop reason: HOSPADM

## 2022-08-30 RX ADMIN — DOCUSATE SODIUM 50MG AND SENNOSIDES 8.6MG 2 TABLET: 8.6; 5 TABLET, FILM COATED ORAL at 09:28

## 2022-08-30 RX ADMIN — Medication 10 ML: at 09:28

## 2022-08-30 RX ADMIN — DOCUSATE SODIUM 50MG AND SENNOSIDES 8.6MG 2 TABLET: 8.6; 5 TABLET, FILM COATED ORAL at 20:44

## 2022-08-30 RX ADMIN — ONDANSETRON 4 MG: 2 INJECTION INTRAMUSCULAR; INTRAVENOUS at 00:14

## 2022-08-30 RX ADMIN — ATORVASTATIN CALCIUM 20 MG: 20 TABLET, FILM COATED ORAL at 09:28

## 2022-08-30 RX ADMIN — ACETAMINOPHEN 650 MG: 325 TABLET, FILM COATED ORAL at 09:28

## 2022-08-30 RX ADMIN — CHOLECALCIFEROL TAB 10 MCG (400 UNIT) 400 UNITS: 10 TAB at 09:27

## 2022-08-30 RX ADMIN — NICOTINE 1 PATCH: 14 PATCH, EXTENDED RELEASE TRANSDERMAL at 22:34

## 2022-08-30 RX ADMIN — ZINC OXIDE 1 APPLICATION: 200 OINTMENT TOPICAL at 09:28

## 2022-08-30 RX ADMIN — DONEPEZIL HYDROCHLORIDE 5 MG: 5 TABLET, FILM COATED ORAL at 09:27

## 2022-08-30 RX ADMIN — BUDESONIDE AND FORMOTEROL FUMARATE DIHYDRATE 2 PUFF: 160; 4.5 AEROSOL RESPIRATORY (INHALATION) at 08:44

## 2022-08-30 RX ADMIN — LEVOTHYROXINE SODIUM 125 MCG: 0.12 TABLET ORAL at 05:41

## 2022-08-30 RX ADMIN — AMLODIPINE BESYLATE 2.5 MG: 2.5 TABLET ORAL at 09:28

## 2022-08-30 RX ADMIN — ZINC OXIDE 1 APPLICATION: 200 OINTMENT TOPICAL at 22:34

## 2022-08-30 RX ADMIN — POTASSIUM CHLORIDE 40 MEQ: 750 TABLET, EXTENDED RELEASE ORAL at 15:01

## 2022-08-30 RX ADMIN — ACETAMINOPHEN 650 MG: 325 TABLET, FILM COATED ORAL at 22:40

## 2022-08-30 RX ADMIN — Medication 10 ML: at 22:42

## 2022-08-31 ENCOUNTER — APPOINTMENT (OUTPATIENT)
Dept: ULTRASOUND IMAGING | Facility: HOSPITAL | Age: 82
End: 2022-08-31

## 2022-08-31 LAB
ALBUMIN FLD-MCNC: 1.6 G/DL
ANION GAP SERPL CALCULATED.3IONS-SCNC: 4.5 MMOL/L (ref 5–15)
APPEARANCE FLD: ABNORMAL
BUN SERPL-MCNC: 11 MG/DL (ref 8–23)
BUN/CREAT SERPL: 19.6 (ref 7–25)
CALCIUM SPEC-SCNC: 8.8 MG/DL (ref 8.6–10.5)
CHLORIDE SERPL-SCNC: 101 MMOL/L (ref 98–107)
CO2 SERPL-SCNC: 34.5 MMOL/L (ref 22–29)
COLOR FLD: ABNORMAL
CREAT SERPL-MCNC: 0.56 MG/DL (ref 0.57–1)
EGFRCR SERPLBLD CKD-EPI 2021: 91.8 ML/MIN/1.73
GLUCOSE FLD-MCNC: 76 MG/DL
GLUCOSE SERPL-MCNC: 85 MG/DL (ref 65–99)
INR PPP: 1.09 (ref 0.9–1.1)
LDH FLD-CCNC: 1523 U/L
LDH SERPL-CCNC: 384 U/L (ref 135–214)
LYMPHOCYTES NFR FLD MANUAL: 29 %
METHOD: ABNORMAL
MONOCYTES NFR FLD: 5 %
MONOS+MACROS NFR FLD: 33 %
NEUTROPHILS NFR FLD MANUAL: 33 %
NUC CELL # FLD: 472 /MM3
PH FLD: 7.23 [PH]
POTASSIUM SERPL-SCNC: 4.1 MMOL/L (ref 3.5–5.2)
PROT FLD-MCNC: 2.6 G/DL
PROT SERPL-MCNC: 6.4 G/DL (ref 6–8.5)
PROTHROMBIN TIME: 14 SECONDS (ref 11.7–14.2)
RBC # FLD AUTO: ABNORMAL /MM3
SODIUM SERPL-SCNC: 140 MMOL/L (ref 136–145)

## 2022-08-31 PROCEDURE — 85610 PROTHROMBIN TIME: CPT | Performed by: INTERNAL MEDICINE

## 2022-08-31 PROCEDURE — 82042 OTHER SOURCE ALBUMIN QUAN EA: CPT | Performed by: INTERNAL MEDICINE

## 2022-08-31 PROCEDURE — 99223 1ST HOSP IP/OBS HIGH 75: CPT | Performed by: NURSE PRACTITIONER

## 2022-08-31 PROCEDURE — 80048 BASIC METABOLIC PNL TOTAL CA: CPT | Performed by: INTERNAL MEDICINE

## 2022-08-31 PROCEDURE — 84157 ASSAY OF PROTEIN OTHER: CPT | Performed by: INTERNAL MEDICINE

## 2022-08-31 PROCEDURE — 87205 SMEAR GRAM STAIN: CPT | Performed by: INTERNAL MEDICINE

## 2022-08-31 PROCEDURE — 88305 TISSUE EXAM BY PATHOLOGIST: CPT | Performed by: INTERNAL MEDICINE

## 2022-08-31 PROCEDURE — 94799 UNLISTED PULMONARY SVC/PX: CPT

## 2022-08-31 PROCEDURE — 84155 ASSAY OF PROTEIN SERUM: CPT | Performed by: INTERNAL MEDICINE

## 2022-08-31 PROCEDURE — 76942 ECHO GUIDE FOR BIOPSY: CPT

## 2022-08-31 PROCEDURE — 87206 SMEAR FLUORESCENT/ACID STAI: CPT | Performed by: INTERNAL MEDICINE

## 2022-08-31 PROCEDURE — 87102 FUNGUS ISOLATION CULTURE: CPT | Performed by: INTERNAL MEDICINE

## 2022-08-31 PROCEDURE — 87070 CULTURE OTHR SPECIMN AEROBIC: CPT | Performed by: INTERNAL MEDICINE

## 2022-08-31 PROCEDURE — 82945 GLUCOSE OTHER FLUID: CPT | Performed by: INTERNAL MEDICINE

## 2022-08-31 PROCEDURE — 87015 SPECIMEN INFECT AGNT CONCNTJ: CPT | Performed by: INTERNAL MEDICINE

## 2022-08-31 PROCEDURE — 0 LIDOCAINE 1 % SOLUTION: Performed by: RADIOLOGY

## 2022-08-31 PROCEDURE — 83986 ASSAY PH BODY FLUID NOS: CPT | Performed by: INTERNAL MEDICINE

## 2022-08-31 PROCEDURE — 83615 LACTATE (LD) (LDH) ENZYME: CPT | Performed by: INTERNAL MEDICINE

## 2022-08-31 PROCEDURE — 0W9B3ZX DRAINAGE OF LEFT PLEURAL CAVITY, PERCUTANEOUS APPROACH, DIAGNOSTIC: ICD-10-PCS | Performed by: RADIOLOGY

## 2022-08-31 PROCEDURE — 89051 BODY FLUID CELL COUNT: CPT | Performed by: INTERNAL MEDICINE

## 2022-08-31 PROCEDURE — 87116 MYCOBACTERIA CULTURE: CPT | Performed by: INTERNAL MEDICINE

## 2022-08-31 PROCEDURE — 88112 CYTOPATH CELL ENHANCE TECH: CPT | Performed by: INTERNAL MEDICINE

## 2022-08-31 RX ORDER — LIDOCAINE HYDROCHLORIDE 10 MG/ML
10 INJECTION, SOLUTION INFILTRATION; PERINEURAL ONCE
Status: COMPLETED | OUTPATIENT
Start: 2022-08-31 | End: 2022-08-31

## 2022-08-31 RX ADMIN — AMLODIPINE BESYLATE 2.5 MG: 2.5 TABLET ORAL at 11:46

## 2022-08-31 RX ADMIN — CHOLECALCIFEROL TAB 10 MCG (400 UNIT) 400 UNITS: 10 TAB at 11:45

## 2022-08-31 RX ADMIN — ZINC OXIDE 1 APPLICATION: 200 OINTMENT TOPICAL at 11:46

## 2022-08-31 RX ADMIN — DONEPEZIL HYDROCHLORIDE 5 MG: 5 TABLET, FILM COATED ORAL at 11:45

## 2022-08-31 RX ADMIN — Medication 10 ML: at 20:28

## 2022-08-31 RX ADMIN — ACETAMINOPHEN 650 MG: 325 TABLET, FILM COATED ORAL at 16:28

## 2022-08-31 RX ADMIN — LIDOCAINE HYDROCHLORIDE 7 ML: 10 INJECTION, SOLUTION INFILTRATION; PERINEURAL at 09:11

## 2022-08-31 RX ADMIN — ZINC OXIDE 1 APPLICATION: 200 OINTMENT TOPICAL at 20:29

## 2022-08-31 RX ADMIN — Medication 10 ML: at 11:46

## 2022-08-31 RX ADMIN — ACETAMINOPHEN 650 MG: 325 TABLET, FILM COATED ORAL at 06:08

## 2022-08-31 RX ADMIN — ACETAMINOPHEN 650 MG: 325 TABLET, FILM COATED ORAL at 20:28

## 2022-08-31 RX ADMIN — DOCUSATE SODIUM 50MG AND SENNOSIDES 8.6MG 2 TABLET: 8.6; 5 TABLET, FILM COATED ORAL at 20:28

## 2022-08-31 RX ADMIN — LEVOTHYROXINE SODIUM 125 MCG: 0.12 TABLET ORAL at 06:08

## 2022-08-31 RX ADMIN — ATORVASTATIN CALCIUM 20 MG: 20 TABLET, FILM COATED ORAL at 11:45

## 2022-08-31 RX ADMIN — ACETAMINOPHEN 650 MG: 325 TABLET, FILM COATED ORAL at 11:44

## 2022-08-31 RX ADMIN — DOCUSATE SODIUM 50MG AND SENNOSIDES 8.6MG 2 TABLET: 8.6; 5 TABLET, FILM COATED ORAL at 11:45

## 2022-08-31 RX ADMIN — BUDESONIDE AND FORMOTEROL FUMARATE DIHYDRATE 2 PUFF: 160; 4.5 AEROSOL RESPIRATORY (INHALATION) at 20:44

## 2022-09-01 ENCOUNTER — APPOINTMENT (OUTPATIENT)
Dept: GENERAL RADIOLOGY | Facility: HOSPITAL | Age: 82
End: 2022-09-01

## 2022-09-01 ENCOUNTER — APPOINTMENT (OUTPATIENT)
Dept: CT IMAGING | Facility: HOSPITAL | Age: 82
End: 2022-09-01

## 2022-09-01 LAB
ANION GAP SERPL CALCULATED.3IONS-SCNC: 6.1 MMOL/L (ref 5–15)
BUN SERPL-MCNC: 11 MG/DL (ref 8–23)
BUN/CREAT SERPL: 22.9 (ref 7–25)
CALCIUM SPEC-SCNC: 8.7 MG/DL (ref 8.6–10.5)
CHLORIDE SERPL-SCNC: 98 MMOL/L (ref 98–107)
CO2 SERPL-SCNC: 30.9 MMOL/L (ref 22–29)
CREAT SERPL-MCNC: 0.48 MG/DL (ref 0.57–1)
CYTO UR: NORMAL
EGFRCR SERPLBLD CKD-EPI 2021: 95.3 ML/MIN/1.73
GLUCOSE SERPL-MCNC: 90 MG/DL (ref 65–99)
LAB AP CASE REPORT: NORMAL
LAB AP DIAGNOSIS COMMENT: NORMAL
PATH REPORT.FINAL DX SPEC: NORMAL
PATH REPORT.GROSS SPEC: NORMAL
POTASSIUM SERPL-SCNC: 4.2 MMOL/L (ref 3.5–5.2)
SODIUM SERPL-SCNC: 135 MMOL/L (ref 136–145)

## 2022-09-01 PROCEDURE — 99232 SBSQ HOSP IP/OBS MODERATE 35: CPT | Performed by: INTERNAL MEDICINE

## 2022-09-01 PROCEDURE — 88360 TUMOR IMMUNOHISTOCHEM/MANUAL: CPT

## 2022-09-01 PROCEDURE — 0BBJ3ZX EXCISION OF LEFT LOWER LUNG LOBE, PERCUTANEOUS APPROACH, DIAGNOSTIC: ICD-10-PCS | Performed by: RADIOLOGY

## 2022-09-01 PROCEDURE — 71045 X-RAY EXAM CHEST 1 VIEW: CPT

## 2022-09-01 PROCEDURE — 80048 BASIC METABOLIC PNL TOTAL CA: CPT | Performed by: INTERNAL MEDICINE

## 2022-09-01 PROCEDURE — 94664 DEMO&/EVAL PT USE INHALER: CPT

## 2022-09-01 PROCEDURE — 87070 CULTURE OTHR SPECIMN AEROBIC: CPT | Performed by: NURSE PRACTITIONER

## 2022-09-01 PROCEDURE — 94799 UNLISTED PULMONARY SVC/PX: CPT

## 2022-09-01 PROCEDURE — 94760 N-INVAS EAR/PLS OXIMETRY 1: CPT

## 2022-09-01 PROCEDURE — 94761 N-INVAS EAR/PLS OXIMETRY MLT: CPT

## 2022-09-01 PROCEDURE — 0 LIDOCAINE 1 % SOLUTION: Performed by: STUDENT IN AN ORGANIZED HEALTH CARE EDUCATION/TRAINING PROGRAM

## 2022-09-01 PROCEDURE — 88305 TISSUE EXAM BY PATHOLOGIST: CPT | Performed by: NURSE PRACTITIONER

## 2022-09-01 PROCEDURE — 88342 IMHCHEM/IMCYTCHM 1ST ANTB: CPT | Performed by: NURSE PRACTITIONER

## 2022-09-01 PROCEDURE — 25010000002 MIDAZOLAM PER 1 MG: Performed by: RADIOLOGY

## 2022-09-01 PROCEDURE — 88341 IMHCHEM/IMCYTCHM EA ADD ANTB: CPT | Performed by: NURSE PRACTITIONER

## 2022-09-01 PROCEDURE — 87205 SMEAR GRAM STAIN: CPT | Performed by: NURSE PRACTITIONER

## 2022-09-01 PROCEDURE — 25010000002 FENTANYL CITRATE (PF) 50 MCG/ML SOLUTION: Performed by: RADIOLOGY

## 2022-09-01 PROCEDURE — 87176 TISSUE HOMOGENIZATION CULTR: CPT | Performed by: NURSE PRACTITIONER

## 2022-09-01 RX ORDER — FENTANYL CITRATE 50 UG/ML
INJECTION, SOLUTION INTRAMUSCULAR; INTRAVENOUS
Status: COMPLETED | OUTPATIENT
Start: 2022-09-01 | End: 2022-09-01

## 2022-09-01 RX ORDER — LIDOCAINE HYDROCHLORIDE 10 MG/ML
20 INJECTION, SOLUTION INFILTRATION; PERINEURAL ONCE
Status: COMPLETED | OUTPATIENT
Start: 2022-09-01 | End: 2022-09-01

## 2022-09-01 RX ORDER — MIDAZOLAM HYDROCHLORIDE 1 MG/ML
INJECTION INTRAMUSCULAR; INTRAVENOUS
Status: COMPLETED | OUTPATIENT
Start: 2022-09-01 | End: 2022-09-01

## 2022-09-01 RX ADMIN — Medication 10 ML: at 20:04

## 2022-09-01 RX ADMIN — BUDESONIDE AND FORMOTEROL FUMARATE DIHYDRATE 2 PUFF: 160; 4.5 AEROSOL RESPIRATORY (INHALATION) at 08:30

## 2022-09-01 RX ADMIN — LEVOTHYROXINE SODIUM 125 MCG: 0.12 TABLET ORAL at 06:12

## 2022-09-01 RX ADMIN — ACETAMINOPHEN 650 MG: 325 TABLET, FILM COATED ORAL at 10:37

## 2022-09-01 RX ADMIN — ACETAMINOPHEN 650 MG: 325 TABLET, FILM COATED ORAL at 04:07

## 2022-09-01 RX ADMIN — DOCUSATE SODIUM 50MG AND SENNOSIDES 8.6MG 2 TABLET: 8.6; 5 TABLET, FILM COATED ORAL at 20:04

## 2022-09-01 RX ADMIN — ZINC OXIDE 1 APPLICATION: 200 OINTMENT TOPICAL at 20:04

## 2022-09-01 RX ADMIN — DONEPEZIL HYDROCHLORIDE 5 MG: 5 TABLET, FILM COATED ORAL at 10:00

## 2022-09-01 RX ADMIN — MIDAZOLAM 0.5 MG: 1 INJECTION INTRAMUSCULAR; INTRAVENOUS at 15:10

## 2022-09-01 RX ADMIN — DOCUSATE SODIUM 50MG AND SENNOSIDES 8.6MG 2 TABLET: 8.6; 5 TABLET, FILM COATED ORAL at 09:58

## 2022-09-01 RX ADMIN — AMLODIPINE BESYLATE 2.5 MG: 2.5 TABLET ORAL at 09:59

## 2022-09-01 RX ADMIN — ACETAMINOPHEN 650 MG: 325 TABLET, FILM COATED ORAL at 20:07

## 2022-09-01 RX ADMIN — FENTANYL CITRATE 25 MCG: 50 INJECTION INTRAMUSCULAR; INTRAVENOUS at 15:10

## 2022-09-01 RX ADMIN — ACETAMINOPHEN 650 MG: 325 TABLET, FILM COATED ORAL at 00:00

## 2022-09-01 RX ADMIN — ZINC OXIDE 1 APPLICATION: 200 OINTMENT TOPICAL at 10:00

## 2022-09-01 RX ADMIN — LIDOCAINE HYDROCHLORIDE 20 ML: 10 INJECTION, SOLUTION INFILTRATION; PERINEURAL at 15:09

## 2022-09-01 RX ADMIN — Medication 10 ML: at 10:01

## 2022-09-01 RX ADMIN — BUDESONIDE AND FORMOTEROL FUMARATE DIHYDRATE 2 PUFF: 160; 4.5 AEROSOL RESPIRATORY (INHALATION) at 20:19

## 2022-09-01 RX ADMIN — ATORVASTATIN CALCIUM 20 MG: 20 TABLET, FILM COATED ORAL at 09:58

## 2022-09-01 RX ADMIN — CHOLECALCIFEROL TAB 10 MCG (400 UNIT) 400 UNITS: 10 TAB at 09:59

## 2022-09-01 NOTE — PLAN OF CARE
Goal Outcome Evaluation:              Outcome Evaluation: VSS. On 2L NC. Tylenol given for back. Bed alarm on. CT needle lung biopsy done today. Jin.

## 2022-09-01 NOTE — PROGRESS NOTES
Subjective .     REASONS FOR FOLLOWUP:      1. Newly left lower lobe lung mass, biopsy done August 25, 2022    2.  Breast cancer history: Followed by Earlene Dey  1. 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 1/11/2018: Neoadjuvant letrozole prescribed, started in 2/2018   - switched to anastrozole in 1/2022   2. 2/28/2018: Right lumpectomy (Dr. Stevens)  3. 2/28/2018: Left lumpectomy ()  3. 1/11/2017: Radiation evaluation - ok to forego radiation Dr. Rocha)    Screening:  Mammogram poonam screening- 12/23/2021- no suspicious findings   DEXA: 6/21/2021 - osteopenia multiple sites T-score -2.3 right femoral neck, T-score -2.1 total right hip,T-score -1.7 lumbar spine   12/16/19- -2.7 Osteoporosis - declines bone modifying agents  Colonoscopy: never, refuses (had colon guard through her pcp)       Interval history:    August 28, 2022:  Patient with spiculated left lower lobe lung mass worrisome for carcinoma.  Difficult to reach bronchoscopically.  Pulmonary is wondering if navigational bronchoscopy could be considered.  Certainly repeat CT-guided biopsy since the first biopsy was negative could be considered  Dr. Gonzalez discussed in length with patient's son regarding getting PET scan as outpatient and if highly active then to questionably consider just radiation versus repeat biopsy and patient's son wanted repeat biopsy.  May be worthwhile considering thoracic surgery consult.  CT abdomen pelvis pending and bone scan pending.  MRI brain atrophy.    Doubt if there is any distant metastasis given a small  lesion 2.  2 x 2.3 x 1.6 cm.    8/29/2022  MRI brain was obtained which did not show any acute intracranial abnormality.  There was moderate to severe small vessel disease and mild diffuse cerebral atrophy.  There was extensive edema and swelling of the scalp overlying the posterior superior lateral aspect of the parietal bone where she likely has a scalp hematoma    9/1/2022  Pleural fluid cytology still pending.  Repeat CT-guided biopsy planned today.  She denies any new complaints.  Remains confused.      HISTORY OF PRESENT ILLNESS:       patient is a 82-year-old female who was admitted on August 24, 2022 with multiple medical problems hypertension hyperlipidemia diabetes, left femoral neck fracture s/p total left hip arthroplasty who came to the ER as she was found down.  Her daughter-in-law and son at the bedside report that they spoke to her yesterday at 4 PM when their brother attempted to call patient she did not answer on the phone at 8 PM and when they went to check on her she was found on the floor.  When she came to the ER she was oriented to her baseline.  Patient family states that she fell about 2 weeks ago and did not want to be evaluated at that time.  CT head head was done in the emergency room which showed no acute intracranial hemorrhage or hydrocephalus.  Chest x-ray showed no evidence of pneumonia there was cardiomegaly.  CT angiogram of the chest was done there was mild dilation of the ascending thoracic aorta measuring 4 cm.  The arch measured 3.6 cm.  In the descending was 3.3 cm.  There is mild cardiac enlargement.  Located in the base of the left lower lobe is a spiculated suspicious mass 2.2 x 2.3 x 1.6 cm worrisome for primary bronchogenic carcinoma.  There is a 7 mm subpleural noncalcified left lower lobe lung nodule.  There is no mediastinal or hilar adenopathy.  There appears to be subpleural atelectasis at the base of the right lower lobe.  There is no lytic or bone lesion seen.  So  there is suspicious spiculated left lower lobe lung mass worrisome for probable primary bronchogenic carcinoma.  There is a 7 mm pleural-based left lower lobe nodule also.  X-ray of the hip was done which showed left hip arthroplasty hardware was intact.  Right hip joint space is preserved.     Her hemoglobin on admission was 11.5 white count of 7.45 platelet of 166.  She had 62% neutrophils and 22% monocytes.  CPK was elevated at 4453.  TSH was 11.5.  Creatinine was 0.65 and liver function tests were mildly elevated with ALT of 36 and AST of 110 with a total bilirubin of 0.4         Past Medical History:   Diagnosis Date    Breast cancer (HCC)     Cancer (HCC)     Cataracts, bilateral     Hyperlipidemia     Hypertension     Hypothyroidism     Uterine prolapse        ONCOLOGIC HISTORY:  (History from previous dates can be found in the separate document.)    No current facility-administered medications on file prior to encounter.     Current Outpatient Medications on File Prior to Encounter   Medication Sig Dispense Refill    acetaminophen (TYLENOL) 325 MG tablet Take 2 tablets by mouth Every 4 (Four) Hours As Needed for Mild Pain .      calcium carb-cholecalciferol 600-800 MG-UNIT tablet Take 2 tablets by mouth Daily.      donepezil (ARICEPT) 5 MG tablet Take 5 mg by mouth Daily.      letrozole (FEMARA) 2.5 MG tablet Take 2.5 mg by mouth Daily.      levothyroxine (SYNTHROID, LEVOTHROID) 112 MCG tablet Take 112 mcg by mouth Daily.      Multiple Vitamins-Minerals (PRESERVISION AREDS) capsule Take 1 tablet by mouth 2 (two) times a day.        Omega-3 Fatty Acids (FISH OIL) 1000 MG capsule capsule Take 1,000 mg by mouth 2 (Two) Times a Day.      simvastatin (ZOCOR) 40 MG tablet Take 40 mg by mouth Daily.      Vitamin D, Cholecalciferol, (CHOLECALCIFEROL) 400 units tablet Take 400 Units by mouth Daily.      bisacodyl (DULCOLAX) 5 MG EC tablet Take 2 tablets by mouth Daily As Needed for Constipation.      estradiol  (ESTRING) 2 MG vaginal ring Insert 2 mg into the vagina Every 3 (Three) Months. follow package directions      Oxyquinoline-Sod Lauryl Sulf 0.025-0.01 % gel Insert  into the vagina 3 (Three) Times a Week.      silver sulfadiazine (SILVADENE, SSD) 1 % cream Apply 1 application topically to the appropriate area as directed As Needed for Wound Care.      triamcinolone (KENALOG) 0.1 % cream Apply 1 application topically to the appropriate area as directed As Needed.         ALLERGIES:     Allergies   Allergen Reactions    Latex, Natural Rubber Rash       Social History     Socioeconomic History    Marital status: Unknown   Tobacco Use    Smoking status: Current Every Day Smoker     Packs/day: 0.50     Years: 20.00     Pack years: 10.00     Types: Cigarettes    Smokeless tobacco: Never Used   Vaping Use    Vaping Use: Never used   Substance and Sexual Activity    Alcohol use: Yes     Comment: SOCIALLY     Drug use: No    Sexual activity: Defer         Cancer-related family history includes Breast cancer in her mother; Lung cancer in her father.     Review of Systems  A comprehensive 14 point review of systems was performed and was negative except as mentioned.    Objective      Vitals:    08/31/22 2140 09/01/22 0452 09/01/22 0830 09/01/22 0859   BP: 127/72   132/68   BP Location: Left arm   Left arm   Patient Position: Lying   Lying   Pulse: 71 67 63 69   Resp: 16 16 16 18   Temp: 97.2 °F (36.2 °C) 97.5 °F (36.4 °C)  97.5 °F (36.4 °C)   TempSrc: Oral Oral  Oral   SpO2: 93% 92% 93% 92%   Weight:       Height:         No flowsheet data found.    Physical Exam  Vitals reviewed.   Constitutional:       Appearance: Normal appearance.   HENT:      Head: Normocephalic.      Nose: Nose normal.   Cardiovascular:      Rate and Rhythm: Normal rate and regular rhythm.      Pulses: Normal pulses.   Pulmonary:      Effort: Pulmonary effort is normal.      Breath sounds: Normal breath sounds.   Skin:     General: Skin is warm.    Neurological:      General: No focal deficit present.             RECENT LABS:  Hematology No results found for: WBC, RBC, HGB, HCT, PLT     Assessment & Plan   *Rhabdomyolysis with elevated CPK  Patient was seen on the floor by the family, unsure how long  Per the family patient had fall 2 weeks ago.  In the ER she was found to have elevated CPK concerning for rhabdomyolysis but the creatinine was normal  She was started on IV fluids  She was tested for COVID-19 which is negative.  CT head was negative, MRI brain negative, chest x-ray negative but CT scan angiogram of the chest did not show pulmonary embolism but showed a left lower lobe lung nodule 2.3 x 2.2 x 1.6 cm.  CK trending down     *New lung nodule in the left lower lobe 2.3 x 2.2 x 1.6 cm s/p biopsy today.  Path pending  CT biopsy of the lung nodule negative  However patient is getting repeat biopsy as they concerned that it is malignancy.  MRI brain showed no intracranial abnormality.  There is moderate mild diffuse cerebral atrophy.  Pulmonary saw patient and discussed different options with patient's son but patient's son does want repeat biopsy.  Bone scan 8/28/2022 negative  CT of the abdomen and pelvis shows an approximately 3.4 cm spiculated mass of the left lower lobe.  Left pleural effusion measuring 3.2 cm.  No evidence of metastatic disease in the abdomen   Pulmonary on board.  Left-sided thoracentesis performed 8/31/2022  Pleural fluid cytology  CT-guided biopsy of the lung planned for today.       *Dementia   B12, folic acid normal  Neurology consulted  TSH elevated     *Leukocytosis   Resolved     *DVT prophylaxis on Lovenox    Plan    Follow-up on pleural fluid cytology  Seen by cardiothoracic surgery and patient thought to be not a surgical candidate  CT of the abdomen and pelvis without any evidence of metastatic  Bone scan negative  Will await pleural fluid cytology  I repeat a CT-guided biopsy likely today.    Sandra Cespedes,  MD                Cc:  Duncan Brian MD

## 2022-09-01 NOTE — PLAN OF CARE
Goal Outcome Evaluation:  Plan of Care Reviewed With: patient        Progress: no change  Outcome Evaluation: VSS. O2 @ 2L/min, no signs of respiratory distress. Tyelnol given PRN for lower back pain, repositioned several times to achieve comfort. Dressing at puncture sites CDI. Magic cream applied to redness at groin. Slept well after midnight dose of Tylenol.

## 2022-09-01 NOTE — PROGRESS NOTES
LOS: 7 days   Patient Care Team:  Duncan Garcia MD as PCP - General (General Practice)  Case, Madison Leung MD as Consulting Physician (Obstetrics and Gynecology)    Subjective     Patient's resting in bed.  Following for spiculated mass and now pleural effusion on the left.  Repeat CT-guided biopsy completed today.  Patient says it did not hurt at all.  She is not having any breathing difficulties.  Her only complaint is she was n.p.o. for the procedure and she wants to eat    Review of Systems:          Objective     Vital Signs  Vital Sign Min/Max for last 24 hours  Temp  Min: 97.1 °F (36.2 °C)  Max: 97.5 °F (36.4 °C)   BP  Min: 121/82  Max: 153/83   Pulse  Min: 63  Max: 78   Resp  Min: 16  Max: 24   SpO2  Min: 92 %  Max: 97 %   Flow (L/min)  Min: 2  Max: 2   No data recorded        Ventilator/Non-Invasive Ventilation Settings (From admission, onward)            None                       Body mass index is 26.15 kg/m².  I/O last 3 completed shifts:  In: 990 [P.O.:990]  Out: 550 [Urine:50; Other:500]  I/O this shift:  In: 60 [P.O.:60]  Out: 400 [Urine:400]        Physical Exam:    General Appearance: Elderly white lady lying in bed does not appear in any acute distress  Eyes: Conjunctiva are clear and anicteric, pupils are equal  ENT: Mucous membranes are moist no erythema or exudates  Neck: No adenopathy no jugular venous distension, trachea midline  Lungs: Clear nonlabored symmetric expansion.   Cardiac: Regular rate rhythm no murmur  Abdomen: Soft no palpable hepatosplenomegaly or masses  : Not examined  Musculoskeletal: Grossly normal maybe a little mild thoracic kyphosis  Skin: Warm and dry no jaundice no petechiae.  Her left forearm, elbow abrasion may be getting infected.  There is some swelling and erythema with it.  This has improved over the last 2 days.  The abrasions on her left knee look okay  Neuro:  She is talking a little more today    following simple commands  today  Extremities/P Vascular: No clubbing no cyanosis no significant edema.  Palpable radial and dorsalis pedis pulses  MSE:  She is getting a little more animated.    Labs:  Results from last 7 days   Lab Units 09/01/22  0600 08/31/22  0533 08/30/22  0650 08/29/22  1546 08/28/22  0553 08/27/22  0931 08/26/22  0729   GLUCOSE mg/dL 90 85 110* 98 86 124* 83   SODIUM mmol/L 135* 140 137 136 140 139 139   POTASSIUM mmol/L 4.2 4.1 3.5 3.7 3.8 3.7 4.0   CO2 mmol/L 30.9* 34.5* 33.0* 30.6* 31.0* 28.5 27.0   CHLORIDE mmol/L 98 101 97* 97* 101 101 103   ANION GAP mmol/L 6.1 4.5* 7.0 8.4 8.0 9.5 9.0   CREATININE mg/dL 0.48* 0.56* 0.50* 0.51* 0.51* 0.57 0.58   BUN mg/dL 11 11 7* 6* 4* 6* 9   BUN / CREAT RATIO  22.9 19.6 14.0 11.8 7.8 10.5 15.5   CALCIUM mg/dL 8.7 8.8 8.5* 8.4* 8.1* 8.2* 8.2*   ALK PHOS U/L  --   --   --   --  38*  --   --    TOTAL PROTEIN g/dL  --  6.4  --   --  5.6*  --   --    ALT (SGPT) U/L  --   --   --   --  57*  --   --    AST (SGOT) U/L  --   --   --   --  170*  --   --    BILIRUBIN mg/dL  --   --   --   --  0.4  --   --    ALBUMIN g/dL  --   --   --   --  3.30*  --   --    GLOBULIN gm/dL  --   --   --   --  2.3  --   --      Estimated Creatinine Clearance: 87.8 mL/min (A) (by C-G formula based on SCr of 0.48 mg/dL (L)).      Results from last 7 days   Lab Units 08/28/22  0553 08/26/22  0729   WBC 10*3/mm3 4.73 4.32   RBC 10*6/mm3 3.25* 3.45*   HEMOGLOBIN g/dL 10.0* 10.5*   HEMATOCRIT % 30.2* 32.7*   MCV fL 92.9 94.8   MCH pg 30.8 30.4   MCHC g/dL 33.1 32.1   RDW % 13.1 13.6   RDW-SD fl 44.1 46.9   MPV fL 10.1 10.3   PLATELETS 10*3/mm3 155 149   NEUTROS ABS 10*3/mm3 2.79 2.59   EOS ABS 10*3/mm3 0.05 0.04   BASOS ABS 10*3/mm3 0.05  --    NRBC /100 WBC  --  1.0*  0.0         Results from last 7 days   Lab Units 08/30/22  0650 08/29/22  1546 08/28/22  0553   CK TOTAL U/L 1,878* 3,151* 5,181*                 Results from last 7 days   Lab Units 08/31/22  0533   INR  1.09     Microbiology Results (last 10  days)     Procedure Component Value - Date/Time    AFB Culture - Body Fluid, Pleural Cavity [[id number]] Collected: 08/31/22 0912    Lab Status: Preliminary result Specimen: Body Fluid from Pleural Cavity Updated: 09/01/22 1509     AFB Stain No acid fast bacilli seen on direct smear    Body Fluid Culture - Body Fluid, Pleural Cavity [[id number]] Collected: 08/31/22 0912    Lab Status: Preliminary result Specimen: Body Fluid from Pleural Cavity Updated: 09/01/22 0928     Body Fluid Culture No growth     Gram Stain No WBCs or organisms seen    COVID PRE-OP / PRE-PROCEDURE SCREENING ORDER (NO ISOLATION) - Swab, Nasopharynx [[id number]]  (Normal) Collected: 08/24/22 2043    Lab Status: Final result Specimen: Swab from Nasopharynx Updated: 08/24/22 2114    Narrative:      The following orders were created for panel order COVID PRE-OP / PRE-PROCEDURE SCREENING ORDER (NO ISOLATION) - Swab, Nasopharynx.  Procedure                               Abnormality         Status                     ---------                               -----------         ------                     COVID-19,[facility]-HOUSE...[[id number]]  Normal              Final result                 Please view results for these tests on the individual orders.    COVID-19,[facility]-HOU[facility] CEPHEID/KYLE NP SWAB IN TRANSPORT MEDIA 8-12 HR TAT - Swab, Nasopharynx [[id number]]  (Normal) Collected: 08/24/22 2043    Lab Status: Final result Specimen: Swab from Nasopharynx Updated: 08/24/22 2114     COVID19 Not Detected    Narrative:      Fact sheet for providers: https://www.fda.gov/media/978455/download    Fact sheet for patients: https://www.fda.gov/media/124179/download    Test performed by PCR.    Blood Culture - Blood, Arm, Left [[id number]]  (Normal) Collected: 08/24/22 1542    Lab Status: Final result Specimen: Blood from Arm, Left Updated: 08/29/22 1604     Blood Culture No growth at 5 days              amLODIPine, 2.5 mg, Oral, Q24H  aspirin, 324 mg, Oral,  Once  atorvastatin, 20 mg, Oral, Daily  budesonide-formoterol, 2 puff, Inhalation, BID - RT  cholecalciferol, 400 Units, Oral, Daily  donepezil, 5 mg, Oral, Daily  hydrocortisone-bacitracin-zinc oxide-nystatin, 1 application, Topical, BID  levothyroxine, 125 mcg, Oral, Q AM  nicotine, 1 patch, Transdermal, Q24H  senna-docusate sodium, 2 tablet, Oral, BID  sodium chloride, 10 mL, Intravenous, Q12H      hold, 1 each        Diagnostics:  CT Head Without Contrast    Result Date: 8/24/2022  CT HEAD WO CONTRAST-, CT CERVICAL SPINE WO CONTRAST-  INDICATIONS: Trauma  TECHNIQUE: Radiation dose reduction techniques were utilized, including automated exposure control and exposure modulation based on body size. Noncontrast head CT, cervical spine CT  COMPARISON: None available  FINDINGS:  Head CT:  No acute intracranial hemorrhage, midline shift or mass effect. No acute territorial infarct is identified.  Prominent periventricular hypodensities suggest chronic small vessel ischemic change in a patient this age.  Arterial calcifications are seen at the base of the brain.  Ventricles, cisterns, cerebral sulci are unremarkable for patient age.  Likely mucous retention cyst or polyp in right maxillary sinus. Minimal paranasal sinus mucosal thickening. The visualized paranasal sinuses, orbits, mastoid air cells are otherwise unremarkable.    Cervical spine CT:  No acute fracture or malalignment is identified.  Facet and uncovertebral joint hypertrophy contribute to neuroforaminal narrowing, more prominent on the right at C5/6. Disc osteophyte complex appears to result in mild central stenosis at C5/6.  Bilateral carotid arterial calcification is present.         No acute intracranial hemorrhage or hydrocephalus. If there is further clinical concern, MRI could be considered for further evaluation.  This report was finalized on 8/24/2022 5:18 PM by Dr. Toby Lang M.D.      CT Cervical Spine Without Contrast    Result Date:  8/24/2022  CT HEAD WO CONTRAST-, CT CERVICAL SPINE WO CONTRAST-  INDICATIONS: Trauma  TECHNIQUE: Radiation dose reduction techniques were utilized, including automated exposure control and exposure modulation based on body size. Noncontrast head CT, cervical spine CT  COMPARISON: None available  FINDINGS:  Head CT:  No acute intracranial hemorrhage, midline shift or mass effect. No acute territorial infarct is identified.  Prominent periventricular hypodensities suggest chronic small vessel ischemic change in a patient this age.  Arterial calcifications are seen at the base of the brain.  Ventricles, cisterns, cerebral sulci are unremarkable for patient age.  Likely mucous retention cyst or polyp in right maxillary sinus. Minimal paranasal sinus mucosal thickening. The visualized paranasal sinuses, orbits, mastoid air cells are otherwise unremarkable.    Cervical spine CT:  No acute fracture or malalignment is identified.  Facet and uncovertebral joint hypertrophy contribute to neuroforaminal narrowing, more prominent on the right at C5/6. Disc osteophyte complex appears to result in mild central stenosis at C5/6.  Bilateral carotid arterial calcification is present.         No acute intracranial hemorrhage or hydrocephalus. If there is further clinical concern, MRI could be considered for further evaluation.  This report was finalized on 8/24/2022 5:18 PM by Dr. Toby Lang M.D.      MRI Brain With & Without Contrast    Result Date: 8/26/2022  MRI OF THE BRAIN WITH AND WITHOUT CONTRAST 08/26/2022  CLINICAL HISTORY: Mental status change, unknown cause, cognitive decline. Patient has history of a fall at home 2 days ago with generalized weakness and also reported history of breast cancer.  TECHNIQUE: Axial T1, FLAIR, fat-suppressed T2, axial diffusion and gradient echo T2, sagittal T1 and postcontrast axial fat-suppressed T1 and coronal and sagittal T1 and thin cut 1.5 mm thick axial postcontrast spoiled  gradient echo T1-weighted images were obtained of the entire head.  COMPARISON: There are no prior MRIs of the head for comparison. This study is correlated to a noncontrast head CT 2 days ago on 08/24/2022.  FINDINGS: There are extensive patchy and confluent areas of T2 high signal throughout the periventricular and subcortical white matter of the cerebral hemispheres and there is very extensive confluent T2 high signal throughout the central pontine white matter and the findings are consistent with moderate to severe small vessel disease involving both the cerebral and central pontine white matter. The remainder of the brain parenchyma is normal in signal intensity. Specifically no diffusion weighted abnormality is seen with no acute infarct identified on the gradient echo T2-weighted images. I see no acute or old blood breakdown products intracranially. There is mild cerebral atrophy. The lateral and 3rd ventricles are minimally prominent size felt to be on the basis of central volume loss or atrophy. I see no mass effect, no midline shift, and no extra-axial fluid collections are identified. No abnormal areas of enhancement are seen in the head. The calvarium and skull base demonstrate normal marrow signal intensity. The orbits are unremarkable. There is prominent T2 hyperintensity compatible with swelling in the scalp overlying the posterior superior and superior lateral left parietal bone and tracking over the inferior lateral left parietal bone and over the lateral left occipital and suboccipital scalp.      1. No acute intracranial abnormality is seen. 2. There is extensive patchy and confluent T2 high signal throughout the cerebral and central pontine white matter consistent with moderate to severe small vessel disease and there is mild diffuse cerebral atrophy. The and lateral and 3rd ventricles are mildly prominent in size felt to be on the basis of central volume loss or atrophy. 3. There is extensive T2  high signal compatible with edema and swelling in the scalp overlying the posterior superior and superior lateral left parietal bone tracking posterior inferiorly over the lateral left occipital and suboccipital scalp likely related to scalp swelling or scalp hematoma from recent head trauma. Correlation with clinical history is suggested. 4. The remainder of the MRI of the brain is normal with no acute infarct seen.        XR Chest 1 View    Result Date: 8/25/2022  XR CHEST 1 VW-  HISTORY: Female who is 82 years-old, post biopsy evaluation  TECHNIQUE: Frontal views of the chest  COMPARISON: 08/25/2022 at 1529 hours  FINDINGS: The heart is mildly enlarged. Aorta is tortuous. Pulmonary vasculature is unremarkable. Old granulomatous disease is apparent. Persistent opacity at the left base appears similar to prior exam. No pleural effusion, or definite pneumothorax. Otherwise stable.      No definite pneumothorax is identified.  This report was finalized on 8/25/2022 5:54 PM by Dr. Toby Lang M.D.      XR Chest 1 View    Result Date: 8/25/2022  XR CHEST 1 VW-  08/25/2022  HISTORY: Status post left lung biopsy.  2 images are submitted.  There is no evidence of pneumothorax. Vague nodular density is seen in the left lung base. Patient is rotated to the left. Heart size appears mildly enlarged.      1. No complications are seen following left lung biopsy.  This report was finalized on 8/25/2022 3:44 PM by Dr. Jerry Brasher M.D.      XR Chest 1 View    Result Date: 8/24/2022  XR CHEST 1 VW-  HISTORY: Female who is 82 years-old,  chest pain  TECHNIQUE: Frontal view of the chest  COMPARISON: 02/22/2019  FINDINGS: The heart is enlarged. Aorta is tortuous, calcified. Pulmonary vasculature is unremarkable. Within granulomatous disease is seen. No focal pulmonary consolidation, pleural effusion, or pneumothorax. No acute osseous process.      No evidence for acute pulmonary process. Cardiomegaly. Tortuous aorta.  Follow-up as clinically indicated.  This report was finalized on 8/24/2022 4:26 PM by Dr. Toby Lang M.D.      CT Needle Biopsy Lung    Result Date: 8/25/2022  CT-GUIDED BIOPSY OF LEFT LOWER LOBE MASS 08/25/2022  HISTORY: Left lower lobe mass.  After signed informed consent was obtained patient was prepped and draped in the right posterior oblique position. Lidocaine was used for local anesthesia.  The patient appeared somewhat confused and had difficulty following commands including difficulty with breath-holding. An 18-gauge biopsy device was introduced for left lung biopsy. When the needle was introduced there was a small amount of pulmonary hemorrhage surrounding the nodule. Only scant biopsy material was obtained given the presence of the hemorrhage. No pneumothorax is seen.      1. Limited biopsy material could be obtained due to patient's inability to breath-hold and some visualized pulmonary hemorrhage around the nodule. 2. There were no complications.  Radiation dose reduction techniques were utilized, including automated exposure control and exposure modulation based on body size.  This report was finalized on 8/25/2022 4:05 PM by Dr. Jerry Brasher M.D.      CT Angiogram Chest    Result Date: 8/24/2022  CT ANGIOGRAM CHEST-  Radiation dose reduction techniques were utilized, including automated exposure control and exposure modulation based on body size.  CT scan of the chest performed with intravenous contrast, pulmonary embolus protocol to include coronal, sagittal and three-dimensional reconstruction.  Clinical: Hypoxia, found down  FINDINGS: Mild dilatation of the ascending thoracic aorta measuring 4 cm in diameter. The arch measures 3.6 cm in diameter and the mid descending measures 3.3 cm in diameter. There is atherosclerotic calcification of the aorta.  There is mild cardiac enlargement, trace pericardial thickening/effusion. Small sliding-type hiatal hernia.  No pulmonary embolus.  Calcified benign granuloma within the right lower lobe with associated calcified benign right hilar lymph nodes.  Located at the base of the left lower lobe there is a spiculated, suspicious mass which measures 2.3 x 2.2 x 1.6 cm, worrisome for primary bronchogenic carcinoma. There is a 7 mm subpleural, noncalcified left lower lobe nodule seen on image 75. No mediastinal or hilar adenopathy.  There appears to be subpleural atelectasis at the base of the right lower lobe along the costophrenic sulcus. There is bullae formation compatible with emphysema.  Limited images through the upper abdomen demonstrate normal adrenal glands. No lytic or sclerotic bone lesion.  CONCLUSION: 1. Suspicious spiculated left lower lobe lung mass worrisome for primary bronchogenic carcinoma. Indeterminant 7 mm pleural-based left lower lobe nodule also seen. No mediastinal or hilar adenopathy. 2. Mild dilatation of the ascending thoracic aorta, 4 cm in diameter. 3. Trace pericardial thickening/effusion. 4. Hiatal hernia. 5. Emphysema. 6. No pulmonary embolus.   This report was finalized on 8/24/2022 7:59 PM by Dr. Homero Gabriel M.D.      XR Spine Lumbar Complete 4+VW    Result Date: 8/24/2022  XR HIP W OR WO PELVIS 2-3 VIEW LEFT-, XR SPINE LUMBAR COMPLETE 4+VW-  INDICATIONS: Trauma  TECHNIQUE: Frontal view the pelvis, 2 views of the left hip, 5 views of the lumbar spine  COMPARISON: Hip x-ray from 02/23/2019  FINDINGS:  No acute fracture is identified.  Lumbar alignment is in range of normal. Multilevel endplate spurring and lower lumbar facet arthropathy are evident. Disc space narrowing is seen at L5/S1, mildly at L4/5. Arterial calcification is conspicuous, and the upper abdominal aorta appears mildly aneurysmal; this appearance could be exaggerated by technique, and dedicated aortic imaging can be obtained for further evaluation.  Left hip arthroplasty hardware appears intact. Right hip joint space appears preserved. The pelvic ring  appears intact. No dislocation.  Follow-up/further evaluation can be obtained as indications persist.       As described.    This report was finalized on 8/24/2022 4:30 PM by Dr. Toby Lang M.D.      XR Hip With or Without Pelvis 2 - 3 View Left    Result Date: 8/24/2022  XR HIP W OR WO PELVIS 2-3 VIEW LEFT-, XR SPINE LUMBAR COMPLETE 4+VW-  INDICATIONS: Trauma  TECHNIQUE: Frontal view the pelvis, 2 views of the left hip, 5 views of the lumbar spine  COMPARISON: Hip x-ray from 02/23/2019  FINDINGS:  No acute fracture is identified.  Lumbar alignment is in range of normal. Multilevel endplate spurring and lower lumbar facet arthropathy are evident. Disc space narrowing is seen at L5/S1, mildly at L4/5. Arterial calcification is conspicuous, and the upper abdominal aorta appears mildly aneurysmal; this appearance could be exaggerated by technique, and dedicated aortic imaging can be obtained for further evaluation.  Left hip arthroplasty hardware appears intact. Right hip joint space appears preserved. The pelvic ring appears intact. No dislocation.  Follow-up/further evaluation can be obtained as indications persist.       As described.    This report was finalized on 8/24/2022 4:30 PM by Dr. Toby Lang M.D.              Active Hospital Problems    Diagnosis  POA   • **Non-traumatic rhabdomyolysis [M62.82]  Yes   • Small vessel disease, cerebrovascular severe [I67.9]  Yes   • Cerebral atrophy (HCC) [G31.9]  Yes   • Scalp hematoma, initial encounter [S00.03XA]  Yes   • Panlobular emphysema (HCC) [J43.1]  Yes   • Suspicious spiculated left lower lobe lung mass worrisome for primary lung cancer [R91.8]  Yes   • Acute respiratory failure with hypoxia (HCC) [J96.01]  Yes   • Traumatic rhabdomyolysis, initial encounter (HCC) [T79.6XXA]  Yes   • Vascular dementia without behavioral disturbance (HCC) [F01.50]  Yes   • 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         Assessment & Plan     1. Spiculated left lower lobe lung mass this is very worrisome for carcinoma.    Status post repeat CT-guided biopsy today.  Await results  2. New left pleural effusion.  This has developed since her 8/24 scan.  It is moderate in size.  It was a little bloody and may have just been reactive effusion to some bleeding with her prior lung biopsy attempt  3. Rhabdomyolysis CK slowly normalizing  4. Dementia  5. Hypertension  6. Acquired hypothyroidism  7. Hyperlipidemia  8. Impaired glucose tolerance  9. Left elbow abrasion.  This is looking better.              Plan for disposition:    Toño Gonzalez Jr, MD  09/01/22  15:13 EDT    Time:

## 2022-09-01 NOTE — PROGRESS NOTES
Name: Kennedi Cardenas ADMIT: 2022   : 1940  PCP: Duncan Garcia MD    MRN: [id number] LOS: 7 days   AGE/SEX: 82 y.o. female  ROOM: [facility]     Subjective   Subjective    Patient resting in bed this morning eating breakfast.  She denies complaints.  She does not remember procedure yesterday.  States that her breathing is about the same.    Review of Systems    Neg for CP, N/V/D    Objective   Objective   Vital Signs  Temp:  [97.1 °F (36.2 °C)-97.6 °F (36.4 °C)] 97.5 °F (36.4 °C)  Heart Rate:  [67-78] 67  Resp:  [16-20] 16  BP: (100-127)/(65-82) 127/72  SpO2:  [92 %-98 %] 92 %  on  Flow (L/min):  [2] 2;   Device (Oxygen Therapy): nasal cannula  Body mass index is 26.15 kg/m².  Physical Exam  Constitutional:       Appearance: Normal appearance.   Cardiovascular:      Rate and Rhythm: Normal rate and regular rhythm.      Heart sounds: No murmur heard.    No gallop.   Pulmonary:      Effort: Pulmonary effort is normal. No respiratory distress.      Breath sounds: Normal breath sounds. No wheezing.      Comments: On NC  Abdominal:      General: There is no distension.      Palpations: There is no mass.      Tenderness: There is no abdominal tenderness.   Neurological:      General: No focal deficit present.      Mental Status: She is alert and oriented to person, place, and time.   Psychiatric:         Mood and Affect: Mood normal.         Behavior: Behavior normal.         Results Review     I reviewed the patient's new clinical results.  Results from last 7 days   Lab Units 22  0553 22  0729   WBC 10*3/mm3 4.73 4.32   HEMOGLOBIN g/dL 10.0* 10.5*   PLATELETS 10*3/mm3 155 149     Results from last 7 days   Lab Units 22  0600 22  0533 22  0650 22  1546   SODIUM mmol/L 135* 140 137 136   POTASSIUM mmol/L 4.2 4.1 3.5 3.7   CHLORIDE mmol/L 98 101 97* 97*   CO2 mmol/L 30.9* 34.5* 33.0* 30.6*   BUN mg/dL 11 11 7* 6*   CREATININE mg/dL 0.48* 0.56* 0.50* 0.51*   GLUCOSE mg/dL  90 85 110* 98   EGFR mL/min/1.73 95.3 91.8 94.4 93.9     Results from last 7 days   Lab Units 08/28/22  0553   ALBUMIN g/dL 3.30*   BILIRUBIN mg/dL 0.4   ALK PHOS U/L 38*   AST (SGOT) U/L 170*   ALT (SGPT) U/L 57*     Results from last 7 days   Lab Units 09/01/22  0600 08/31/22  0533 08/30/22  0650 08/29/22  1546 08/28/22  0553   CALCIUM mg/dL 8.7 8.8 8.5* 8.4* 8.1*   ALBUMIN g/dL  --   --   --   --  3.30*         No results found for: HGBA1C, POCGLU    US Thoracentesis    Result Date: 8/31/2022  Ultrasound-guided left thoracentesis as described.   This report was finalized on 8/31/2022 9:27 AM by Dr. Minor Calero M.D.      Scheduled Medications  amLODIPine, 2.5 mg, Oral, Q24H  aspirin, 324 mg, Oral, Once  atorvastatin, 20 mg, Oral, Daily  budesonide-formoterol, 2 puff, Inhalation, BID - RT  cholecalciferol, 400 Units, Oral, Daily  donepezil, 5 mg, Oral, Daily  hydrocortisone-bacitracin-zinc oxide-nystatin, 1 application, Topical, BID  levothyroxine, 125 mcg, Oral, Q AM  nicotine, 1 patch, Transdermal, Q24H  senna-docusate sodium, 2 tablet, Oral, BID  sodium chloride, 10 mL, Intravenous, Q12H    Infusions  hold, 1 each    Diet  Diet Regular       Assessment/Plan     Active Hospital Problems    Diagnosis  POA   • **Non-traumatic rhabdomyolysis [M62.82]  Yes   • Small vessel disease, cerebrovascular severe [I67.9]  Yes   • Cerebral atrophy (HCC) [G31.9]  Yes   • Scalp hematoma, initial encounter [S00.03XA]  Yes   • Panlobular emphysema (HCC) [J43.1]  Yes   • Suspicious spiculated left lower lobe lung mass worrisome for primary lung cancer [R91.8]  Yes   • Acute respiratory failure with hypoxia (HCC) [J96.01]  Yes   • Traumatic rhabdomyolysis, initial encounter (HCC) [T79.6XXA]  Yes   • Vascular dementia without behavioral disturbance (HCC) [F01.50]  Yes   • 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]  Yes   • Hypoxia [R09.02]  Yes   • Tobacco abuse [Z72.0]  Yes   • Leukocytosis [D72.829]  Yes   • Recurrent falls [R29.6]   Not Applicable   • Dementia without behavioral disturbance (HCC) [F03.90]  Yes   • Impaired glucose tolerance [R73.02]  Yes   • Essential hypertension [I10]  Yes   • Acquired hypothyroidism [E03.9]  Yes   • HLD (hyperlipidemia) [E78.5]  Yes   • Vitamin D deficiency [E55.9]  Yes      Resolved Hospital Problems   No resolved problems to display.       82 y.o. female admitted with Non-traumatic rhabdomyolysis.     Spiculated lung nodule  -concerning for malignancy on imaging  -s/p CT guided bx w/ IR- not adequate sample  -family not interested in surgery of CTX; would consider XRT or immunotherapy  -s/p thoracentesis 8/31/22; pending cytology, if negative planning for repeat CT-guided bx w/ IR     COPD/emphysema, respiratory failure with hypoxia:  -cont Symbicort, PRN albuterol  -smoking cessation  -will likely need home O2 eval prior to dc     Hypothyroidism  -TSH 11.5 (8/25/22)  -increased Synthroid from 112mcg to125 mcg daily   -Recheck in 6 weeks as OP (around 10/14/22)     HTN  -Continue amlodipine 2.5 mg    Rhabdomyolysis, resolved  -in setting of fall; pending dc to SNF    · SCDs for DVT prophylaxis.  · Full code.  · Discussed with patient and care team on multidisciplinary rounds.  · Anticipate discharge to SNU facility when cleared by consultants.      Jim Borrego MD  [facility]  09/01/22  07:00 EDT

## 2022-09-01 NOTE — POST-PROCEDURE NOTE
POST PROCEDURE NOTE    Procedure: CT left lung biopsy    Pre-Procedure Diagnosis: Left lung mass    Post-procedure Diagnosis: same    Findings: Technically successful CT guided left lung mass biopsy    Complications: No immediate    Blood loss: None    Specimen Removed: Four 20 gauge cores    Disposition:   Transfer back to inpatient room

## 2022-09-02 PROBLEM — D72.829 LEUKOCYTOSIS: Status: RESOLVED | Noted: 2022-08-24 | Resolved: 2022-09-02

## 2022-09-02 PROBLEM — M62.82 NON-TRAUMATIC RHABDOMYOLYSIS: Status: RESOLVED | Noted: 2022-08-24 | Resolved: 2022-09-02

## 2022-09-02 PROBLEM — S00.03XA SCALP HEMATOMA, INITIAL ENCOUNTER: Status: RESOLVED | Noted: 2022-08-26 | Resolved: 2022-09-02

## 2022-09-02 LAB
ANION GAP SERPL CALCULATED.3IONS-SCNC: 10.2 MMOL/L (ref 5–15)
BUN SERPL-MCNC: 11 MG/DL (ref 8–23)
BUN/CREAT SERPL: 19 (ref 7–25)
CALCIUM SPEC-SCNC: 9.2 MG/DL (ref 8.6–10.5)
CHLORIDE SERPL-SCNC: 98 MMOL/L (ref 98–107)
CO2 SERPL-SCNC: 30.8 MMOL/L (ref 22–29)
CREAT SERPL-MCNC: 0.58 MG/DL (ref 0.57–1)
EGFRCR SERPLBLD CKD-EPI 2021: 91 ML/MIN/1.73
GLUCOSE SERPL-MCNC: 86 MG/DL (ref 65–99)
POTASSIUM SERPL-SCNC: 4.3 MMOL/L (ref 3.5–5.2)
SODIUM SERPL-SCNC: 139 MMOL/L (ref 136–145)

## 2022-09-02 PROCEDURE — 94799 UNLISTED PULMONARY SVC/PX: CPT

## 2022-09-02 PROCEDURE — 94664 DEMO&/EVAL PT USE INHALER: CPT

## 2022-09-02 PROCEDURE — 99232 SBSQ HOSP IP/OBS MODERATE 35: CPT | Performed by: INTERNAL MEDICINE

## 2022-09-02 PROCEDURE — 97110 THERAPEUTIC EXERCISES: CPT

## 2022-09-02 PROCEDURE — 80048 BASIC METABOLIC PNL TOTAL CA: CPT | Performed by: INTERNAL MEDICINE

## 2022-09-02 PROCEDURE — 97530 THERAPEUTIC ACTIVITIES: CPT

## 2022-09-02 PROCEDURE — 99232 SBSQ HOSP IP/OBS MODERATE 35: CPT | Performed by: NURSE PRACTITIONER

## 2022-09-02 RX ADMIN — DOCUSATE SODIUM 50MG AND SENNOSIDES 8.6MG 2 TABLET: 8.6; 5 TABLET, FILM COATED ORAL at 09:22

## 2022-09-02 RX ADMIN — ATORVASTATIN CALCIUM 20 MG: 20 TABLET, FILM COATED ORAL at 09:23

## 2022-09-02 RX ADMIN — BUDESONIDE AND FORMOTEROL FUMARATE DIHYDRATE 2 PUFF: 160; 4.5 AEROSOL RESPIRATORY (INHALATION) at 10:21

## 2022-09-02 RX ADMIN — CHOLECALCIFEROL TAB 10 MCG (400 UNIT) 400 UNITS: 10 TAB at 09:22

## 2022-09-02 RX ADMIN — BUDESONIDE AND FORMOTEROL FUMARATE DIHYDRATE 2 PUFF: 160; 4.5 AEROSOL RESPIRATORY (INHALATION) at 20:03

## 2022-09-02 RX ADMIN — ZINC OXIDE 1 APPLICATION: 200 OINTMENT TOPICAL at 21:47

## 2022-09-02 RX ADMIN — DONEPEZIL HYDROCHLORIDE 5 MG: 5 TABLET, FILM COATED ORAL at 09:22

## 2022-09-02 RX ADMIN — ACETAMINOPHEN 650 MG: 325 TABLET, FILM COATED ORAL at 05:20

## 2022-09-02 RX ADMIN — Medication 10 ML: at 09:24

## 2022-09-02 RX ADMIN — LEVOTHYROXINE SODIUM 125 MCG: 0.12 TABLET ORAL at 05:20

## 2022-09-02 RX ADMIN — ACETAMINOPHEN 650 MG: 325 TABLET, FILM COATED ORAL at 16:58

## 2022-09-02 RX ADMIN — AMLODIPINE BESYLATE 2.5 MG: 2.5 TABLET ORAL at 09:22

## 2022-09-02 RX ADMIN — ZINC OXIDE 1 APPLICATION: 200 OINTMENT TOPICAL at 09:24

## 2022-09-02 RX ADMIN — ACETAMINOPHEN 650 MG: 325 TABLET, FILM COATED ORAL at 21:46

## 2022-09-02 NOTE — PLAN OF CARE
Goal Outcome Evaluation:  Plan of Care Reviewed With: patient        Progress: improving  Outcome Evaluation: Pt agreeable to PT this afternoon. Demo's improvement in bed mobility this date, although does req inc'd time and cueing to perform. Pt able to perform seated LE ther ex before ambulating 5' to chair with Dain. Pt continues to benefit from skilled PT.

## 2022-09-02 NOTE — PROGRESS NOTES
Continued Stay Note  [facility]     Patient Name: Kennedi Cardenas  MRN: [id number]  Today's Date: 9/2/2022    Admit Date: 8/24/2022     Discharge Plan     Row Name 09/02/22 1419       Plan    Plan [facility] Pre-cert obtained on 8/31    Plan Comments Msg sent to [facility] with [place] regarding pre-cert and if bed available over the weekend.               Discharge Codes    No documentation.               Expected Discharge Date and Time     Expected Discharge Date Expected Discharge Time    Sep 3, 2022             Vika Santizo RN

## 2022-09-02 NOTE — THERAPY TREATMENT NOTE
Patient Name: Kennedi Cardenas  : 1940    MRN: [id number]                              Today's Date: 2022       Admit Date: 2022    Visit Dx:     ICD-10-CM ICD-9-CM   1. Traumatic rhabdomyolysis, initial encounter (HCC)  T79.6XXA 958.6   2. Contusion of left hip, initial encounter  S70.02XA 924.01   3. Hypoxia  R09.02 799.02     Patient Active Problem List   Diagnosis   • HLD (hyperlipidemia)   • Essential hypertension   • Acquired hypothyroidism   • Osteopenia   • Vitamin D deficiency   • Prediabetes   • Left displaced femoral neck fracture (HCC)   • Hypertension   • Hypothyroidism   • Impaired glucose tolerance   • Hypoxia   • Tobacco abuse   • Recurrent falls   • Dementia without behavioral disturbance (HCC)   • Panlobular emphysema (HCC)   • Suspicious spiculated left lower lobe lung mass worrisome for primary lung cancer   • Acute respiratory failure with hypoxia (HCC)   • Traumatic rhabdomyolysis, initial encounter (HCC)   • Vascular dementia without behavioral disturbance (HCC)   • Brief psychotic disorder (HCC)   • Small vessel disease, cerebrovascular severe   • Cerebral atrophy (HCC)     Past Medical History:   Diagnosis Date   • Breast cancer (HCC)    • Cancer (HCC)    • Cataracts, bilateral    • Hyperlipidemia    • Hypertension    • Hypothyroidism    • Uterine prolapse      Past Surgical History:   Procedure Laterality Date   • BREAST LUMPECTOMY Bilateral    • EYE SURGERY     • TOTAL HIP ARTHROPLASTY Left 2019    Procedure: TOTAL HIP ARTHROPLASTY ANTERIOR;  Surgeon: Magdalena De Los Santos MD;  Location: [facility];  Service: Orthopedics      General Information     Row Name 22 1549          Physical Therapy Time and Intention    Document Type therapy note (daily note)  -DB     Mode of Treatment physical therapy;individual therapy  -DB     Row Name 22 1549          General Information    Patient Profile Reviewed yes  -DB           User Key  (r) = Recorded By, (t) =  Taken By, (c) = Cosigned By    Initials Name Provider Type    DB Rianna Pascual PT Physical Therapist               Mobility     Row Name 09/02/22 1549          Bed Mobility    Bed Mobility supine-sit  -DB     Supine-Sit Butler (Bed Mobility) contact guard;verbal cues;nonverbal cues (demo/gesture)  -DB     Assistive Device (Bed Mobility) head of bed elevated;bed rails  -DB     Comment, (Bed Mobility) inc'd time and cueing  -DB     Row Name 09/02/22 1549          Sit-Stand Transfer    Sit-Stand Butler (Transfers) minimum assist (75% patient effort);2 person assist;verbal cues  -DB     Assistive Device (Sit-Stand Transfers) walker, front-wheeled  -DB     Row Name 09/02/22 1549          Gait/Stairs (Locomotion)    Butler Level (Gait) minimum assist (75% patient effort);nonverbal cues (demo/gesture);verbal cues  -DB     Assistive Device (Gait) walker, front-wheeled  -DB     Distance in Feet (Gait) 5' over to chair  -DB     Deviations/Abnormal Patterns (Gait) gait speed decreased;stride length decreased;festinating/shuffling  -DB     Bilateral Gait Deviations forward flexed posture;heel strike decreased  -DB           User Key  (r) = Recorded By, (t) = Taken By, (c) = Cosigned By    Initials Name Provider Type    Rianna Grissom PT Physical Therapist               Obj/Interventions     Row Name 09/02/22 1549          Motor Skills    Therapeutic Exercise other (see comments)  seated MIP, LAQ, AP x 10 BLE  -DB     Row Name 09/02/22 1549          Balance    Balance Assessment sitting static balance;sitting dynamic balance;standing static balance;standing dynamic balance  -DB     Static Sitting Balance standby assist  -DB     Dynamic Sitting Balance standby assist  -DB     Position, Sitting Balance unsupported;sitting edge of bed  -DB     Static Standing Balance minimal assist  -DB     Dynamic Standing Balance minimal assist  -DB     Position/Device Used, Standing Balance supported;walker,  front-wheeled  -DB     Balance Interventions sitting;standing;sit to stand  -DB           User Key  (r) = Recorded By, (t) = Taken By, (c) = Cosigned By    Initials Name Provider Type    Rianna Grissom PT Physical Therapist               Goals/Plan    No documentation.                Clinical Impression     Row Name 09/02/22 1550          Pain    Pretreatment Pain Rating 0/10 - no pain  -DB     Posttreatment Pain Rating 0/10 - no pain  -DB     Pain Intervention(s) Ambulation/increased activity;Repositioned  -DB     Row Name 09/02/22 1550          Plan of Care Review    Plan of Care Reviewed With patient  -DB     Progress improving  -DB     Outcome Evaluation Pt agreeable to PT this afternoon. Demo's improvement in bed mobility this date, although does req inc'd time and cueing to perform. Pt able to perform seated LE ther ex before ambulating 5' to chair with Dain. Pt continues to benefit from skilled PT.  -DB     Row Name 09/02/22 1550          Vital Signs    O2 Delivery Pre Treatment supplemental O2  -DB     O2 Delivery Intra Treatment supplemental O2  -DB     O2 Delivery Post Treatment supplemental O2  -DB     Pre Patient Position Supine  -DB     Intra Patient Position Standing  -DB     Post Patient Position Sitting  -DB     Row Name 09/02/22 1550          Positioning and Restraints    Pre-Treatment Position in bed  -DB     Post Treatment Position chair  -DB     In Chair reclined;sitting;call light within reach;encouraged to call for assist;exit alarm on  -DB           User Key  (r) = Recorded By, (t) = Taken By, (c) = Cosigned By    Initials Name Provider Type    Rianna Grissom PT Physical Therapist               Outcome Measures     Row Name 09/02/22 1558 09/02/22 0810       How much help from another person do you currently need...    Turning from your back to your side while in flat bed without using bedrails? 3  -DB 3  -CH    Moving from lying on back to sitting on the side of a flat bed without  bedrails? 3  -DB 3  -CH    Moving to and from a bed to a chair (including a wheelchair)? 3  -DB 3  -CH    Standing up from a chair using your arms (e.g., wheelchair, bedside chair)? 3  -DB 3  -CH    Climbing 3-5 steps with a railing? 2  -DB 2  -CH    To walk in hospital room? 3  -DB 2  -CH    AM-PAC 6 Clicks Score (PT) 17  -DB 16  -CH    Highest level of mobility 5 --> Static standing  -DB 5 --> Static standing  -CH    Row Name 09/02/22 1558          Functional Assessment    Outcome Measure Options AM-PAC 6 Clicks Basic Mobility (PT)  -DB           User Key  (r) = Recorded By, (t) = Taken By, (c) = Cosigned By    Initials Name Provider Type    CH Sofia Kline, RN Registered Nurse    Rianna Grissom PT Physical Therapist                             Physical Therapy Education                 Title: PT OT SLP Therapies (Done)     Topic: Physical Therapy (Done)     Point: Mobility training (Done)     Learning Progress Summary           Patient Acceptance, E, VU by DB at 9/2/2022 1558    Acceptance, E,TB, VU,NR by MT at 8/31/2022 0530    Acceptance, E,D, NR by  at 8/29/2022 1300    Acceptance, E,TB,D, VU,NR by  at 8/26/2022 1601    Acceptance, E,TB,D, VU,NR by  at 8/25/2022 1147   Family Acceptance, E,TB,D, VU,NR by  at 8/26/2022 1601                   Point: Home exercise program (Done)     Learning Progress Summary           Patient Acceptance, E, VU by DB at 9/2/2022 1558                   Point: Body mechanics (Done)     Learning Progress Summary           Patient Acceptance, E, VU by DB at 9/2/2022 1558                   Point: Precautions (Done)     Learning Progress Summary           Patient Acceptance, E, VU by DB at 9/2/2022 1558    Acceptance, E,TB, VU,NR by MT at 8/31/2022 0530                               User Key     Initials Effective Dates Name Provider Type Discipline    PC 06/16/21 -  Sydney Garner, PT Physical Therapist PT    MT 06/16/21 -  Kranthi Ceja, RN Registered Nurse  Nurse    EJ 06/16/21 -  Amber Quevedo, PT Physical Therapist PT    DB 06/16/21 -  Rianna Pascual PT Physical Therapist PT              PT Recommendation and Plan     Plan of Care Reviewed With: patient  Progress: improving  Outcome Evaluation: Pt agreeable to PT this afternoon. Demo's improvement in bed mobility this date, although does req inc'd time and cueing to perform. Pt able to perform seated LE ther ex before ambulating 5' to chair with Dian. Pt continues to benefit from skilled PT.     Time Calculation:    PT Charges     Row Name 09/02/22 1558             Time Calculation    Start Time 1448  -DB      Stop Time 1511  -DB      Time Calculation (min) 23 min  -DB      PT Received On 09/02/22  -DB      PT - Next Appointment 09/03/22  -DB              Time Calculation- PT    Total Timed Code Minutes- PT 23 minute(s)  -DB            User Key  (r) = Recorded By, (t) = Taken By, (c) = Cosigned By    Initials Name Provider Type    DB Rianna Pascual, PT Physical Therapist              Therapy Charges for Today     Code Description Service Date Service Provider Modifiers Qty    81220413102 HC PT THERAPEUTIC ACT EA 15 MIN 9/2/2022 Rianna Pascual, PT GP 1    28385923241 HC PT THER PROC EA 15 MIN 9/2/2022 Rianna Pascual, PT GP 1    04777018257 HC PT THER SUPP EA 15 MIN 9/2/2022 Rianna Pascual, PT GP 1          PT G-Codes  Outcome Measure Options: AM-PAC 6 Clicks Basic Mobility (PT)  AM-PAC 6 Clicks Score (PT): 17    Rianna Pascual PT  9/2/2022

## 2022-09-02 NOTE — PLAN OF CARE
Goal Outcome Evaluation:  Plan of Care Reviewed With: patient        Progress: improving  Outcome Evaluation: AAO.  medicated x1 with prn tylenol for c/o back discomfort.  oxygen at 1.5 LPM.  no respiratory c/o.  respirations even and unlabored.  purewick in use.  multiple extensive bruises and scabbing to left side.  turned and repositioned often.  waffle boots in place.  bed alarm in use.  up to chair with PT and back to bed with nursing staff using walker.

## 2022-09-02 NOTE — PROGRESS NOTES
"    Chief Complaint: lung nodule, follow-up  S/P: CT guided biopsy  POD # 1    Subjective:  Symptoms:  Stable.  She reports weakness.    Diet:  No nausea or vomiting.    Activity level: Impaired due to weakness.    Pain:  She complains of pain that is mild.        Vital Signs:  Temp:  [97 °F (36.1 °C)-97.6 °F (36.4 °C)] 97.5 °F (36.4 °C)  Heart Rate:  [75-79] 76  Resp:  [18-20] 20  BP: (114-131)/(67-76) 114/71    Intake & Output (last day)       09/01 0701  09/02 0700 09/02 0701  09/03 0700    P.O. 390 240    Total Intake(mL/kg) 390 (5.7) 240 (3.5)    Urine (mL/kg/hr) 700 (0.4)     Other      Stool 0     Total Output 700     Net -310 +240          Urine Unmeasured Occurrence 1 x     Stool Unmeasured Occurrence 1 x           Objective:  General Appearance:  Comfortable, ill-appearing, in no acute distress and not in pain.    Vital signs: (most recent): Blood pressure 114/71, pulse 76, temperature 97.5 °F (36.4 °C), temperature source Oral, resp. rate 20, height 162.6 cm (64\"), weight 69.1 kg (152 lb 5.4 oz), SpO2 92 %.  Vital signs are normal.  No fever.    Lungs:  Normal effort and normal respiratory rate.  She is not in respiratory distress.  No wheezes or rhonchi.    Heart: Normal rate.    Abdomen: Abdomen is soft.  There is no abdominal tenderness.     Neurological: Patient is alert.    Skin:  Warm and dry.          Results Review:     I reviewed the patient's new clinical results.  I reviewed the patient's new imaging results and agree with the interpretation.  I reviewed the patient's other test results and agree with the interpretation  Discussed with Patient, family member, Dr. Gonzalez and Dr. Mcnamara.    Imaging Results (Last 24 Hours)     Procedure Component Value Units Date/Time    XR Chest 1 View [[id number]] Collected: 09/01/22 1649     Updated: 09/02/22 1014    Narrative:      AP CHEST     HISTORY: Post left lung biopsy     COMPARISON: Earlier today     FINDINGS: There is no appreciable pneumothorax. Stable " nodular opacity  in the left base and right basilar atelectasis. Heart size stable.  Interval pleural fluid on the left       Impression:      No appreciable pneumothorax     This report was finalized on 9/2/2022 10:13 AM by Dr. Minor Calero M.D.       XR Chest 1 View [[id number]] Collected: 09/02/22 0637     Updated: 09/02/22 0937    Narrative:      AP CHEST     HISTORY: Post left lung biopsy     COMPARISON: CT biopsy images from earlier today, chest x-ray 08/25/2022     FINDINGS: There is a possible minimal left apical pneumothorax versus  rib shadow. Nodular opacity at the left lung base, which was biopsied  earlier. Mild atelectasis elsewhere in the lung bases. Heart size  stable.       Impression:      Possible minimal left apical pneumothorax versus rib shadow. Another  chest x-ray will be performed in 2 hours to reassess.     This report was finalized on 9/1/2022 4:51 PM by Dr. Minor Calero M.D.             Lab Results:     Lab Results (last 24 hours)     Procedure Component Value Units Date/Time    Tissue Pathology Exam [[id number]] Collected: 09/01/22 1520    Specimen: Tissue from Lung, Left Upper Lobe Updated: 09/02/22 1428     Case Report --     Surgical Pathology Report                         Case: [id number]                                  Authorizing Provider:  Saniya Luis APRN      Collected:           09/01/2022 03:20 PM          Ordering Location:     [facility]  Received:            09/01/2022 03:44 PM                                 [address]                                                                       Pathologist:           Vika Apple MD                                                    Specimen:    Lung, Left Upper Lobe                                                                       Clinical Information --     Left lower lobe lung mass       Final Diagnosis --     1. Lung, Left Upper Lobe, CT-Guided Biopsy for a Mass:  INVASIVE MODERATELY  "DIFFERENTIATED       ADENOCARCINOMA OF PULMONARY ORIGIN.    [facility]        Comment --     A representative H/E stained slide is reviewed internally with Dr. Pelletier, who concurs. PD-L1 studies are pending and will be issued in an addendum to this report.   [facility]        Gross Description --     1. Lung, Left Upper Lobe.  Received in formalin labeled \"left upper lung lobe biopsy\" are two tan to gray-white core fragments of soft tissue measuring from 0.6 cm to 0.7 x 0.1 cm in tubal diameter.  The tissue is entirely submitted in 1A.    [facility]         Special Stains --     Utilizing appropriate controls, multiple immunohistochemical stains are performed on block 1A including Ck7, TTF-1 and Napsin.  The tumor cells stain positive for all three markers, supporting a pulmonary adenocarcinoma.    [facility]       Tissue / Bone Culture - Tissue, Lung, L [[id number]] Collected: 09/01/22 1520    Specimen: Tissue from Lung, L Updated: 09/02/22 0956     Tissue Culture No growth     Gram Stain No WBCs or organisms seen    Body Fluid Culture - Body Fluid, Pleural Cavity [[id number]] Collected: 08/31/22 0912    Specimen: Body Fluid from Pleural Cavity Updated: 09/02/22 0918     Body Fluid Culture No growth at 2 days     Gram Stain No WBCs or organisms seen    Basic Metabolic Panel [[id number]]  (Abnormal) Collected: 09/02/22 0530    Specimen: Blood Updated: 09/02/22 0642     Glucose 86 mg/dL      BUN 11 mg/dL      Creatinine 0.58 mg/dL      Sodium 139 mmol/L      Potassium 4.3 mmol/L      Chloride 98 mmol/L      CO2 30.8 mmol/L      Calcium 9.2 mg/dL      BUN/Creatinine Ratio 19.0     Anion Gap 10.2 mmol/L      eGFR 91.0 mL/min/1.73      Comment: National Kidney Foundation and American Society of Nephrology (ASN) Task Force recommended calculation based on the Chronic Kidney Disease Epidemiology Collaboration (CKD-EPI) equation refit without adjustment for race.       Narrative:      GFR Normal >60  Chronic Kidney Disease " <60  Kidney Failure <15             Assessment & Plan       Hypoxia    Tobacco abuse    Recurrent falls    Dementia without behavioral disturbance (HCC)    Panlobular emphysema (HCC)    Suspicious spiculated left lower lobe lung mass worrisome for primary lung cancer    Acute respiratory failure with hypoxia (HCC)    Vascular dementia without behavioral disturbance (HCC)       Assessment & Plan     Patient is a pleasant 82-year-old female who presented to [facility] after being found down.  Patient was in rhabdomyolysis which appears to be resolving.  She has a history of tobacco abuse with emphysema and has had some hypoxia during this admission.  She is now comfortable on room air.  Patient has a history of dementia.  She is pleasant and alert but sometimes confused to situation.  We we have been asked to see her concerning spiculated left lower lobe lung mass.  Patient was sent for CT-guided needle biopsy yesterday.  Pathology is consistent with moderately differentiated adenocarcinoma of the lung.   I had a long talk with the patient's son via phone.  Explained that the patient would not be a good candidate for surgical intervention but I will defer to the medical oncology team whether she would tolerate alternative treatments and/or radiation.  Obviously, she will need outpatient PET for final staging.  We will sign off but are happy to see Ms. Cardenas again, if needed.  Thank you for allowing us to participate in her care.    SHANTAL Davis  [facility]  09/02/22  16:33 EDT    Patient was seen and assessed while wearing personal protective equipment including facemask, protective eyewear and gloves.  Hand hygiene performed prior to entering the room and upon exiting with doffing of gloves.

## 2022-09-02 NOTE — PROGRESS NOTES
LOS: 8 days   Patient Care Team:  Duncan Garcia MD as PCP - General (General Practice)  Case, Madison Leung MD as Consulting Physician (Obstetrics and Gynecology)    Subjective     Patient's resting in bed.  Following for spiculated mass and now pleural effusion on the left.  Repeat CT-guided biopsy completed today.  Patient says it did not hurt at all.  She is not having any breathing difficulties.  Only complaint today.  She is just tired of being in the hospital    Review of Systems:          Objective     Vital Signs  Vital Sign Min/Max for last 24 hours  Temp  Min: 97 °F (36.1 °C)  Max: 97.6 °F (36.4 °C)   BP  Min: 114/71  Max: 153/83   Pulse  Min: 67  Max: 79   Resp  Min: 15  Max: 24   SpO2  Min: 91 %  Max: 98 %   Flow (L/min)  Min: 1.5  Max: 2   No data recorded        Ventilator/Non-Invasive Ventilation Settings (From admission, onward)            None                       Body mass index is 26.15 kg/m².  I/O last 3 completed shifts:  In: 390 [P.O.:390]  Out: 700 [Urine:700]  I/O this shift:  In: 120 [P.O.:120]  Out: -         Physical Exam:    General Appearance: Elderly white lady lying in bed does not appear in any acute distress  Eyes: Conjunctiva are clear and anicteric, pupils are equal  ENT: Mucous membranes are moist no erythema or exudates  Neck: No adenopathy no jugular venous distension, trachea midline  Lungs: Clear nonlabored symmetric expansion.   Cardiac: Regular rate rhythm no murmur  Abdomen: Soft no palpable hepatosplenomegaly or masses  : Not examined  Musculoskeletal: Grossly normal maybe a little mild thoracic kyphosis  Skin: Warm and dry no jaundice no petechiae.  Her left forearm, elbow abrasion may be getting infected.  There is some swelling and erythema with it.  This has improved over the last few days.  The abrasions on her left knee look okay  Neuro:  She is talking a little more today    following simple commands today  Extremities/P Vascular: No clubbing no  cyanosis no significant edema.  Palpable radial and dorsalis pedis pulses  MSE:  She is certainly getting more conversant interactive.    Labs:  Results from last 7 days   Lab Units 09/02/22  0530 09/01/22  0600 08/31/22  0533 08/30/22  0650 08/29/22  1546 08/28/22  0553 08/27/22  0931   GLUCOSE mg/dL 86 90 85 110* 98 86 124*   SODIUM mmol/L 139 135* 140 137 136 140 139   POTASSIUM mmol/L 4.3 4.2 4.1 3.5 3.7 3.8 3.7   CO2 mmol/L 30.8* 30.9* 34.5* 33.0* 30.6* 31.0* 28.5   CHLORIDE mmol/L 98 98 101 97* 97* 101 101   ANION GAP mmol/L 10.2 6.1 4.5* 7.0 8.4 8.0 9.5   CREATININE mg/dL 0.58 0.48* 0.56* 0.50* 0.51* 0.51* 0.57   BUN mg/dL 11 11 11 7* 6* 4* 6*   BUN / CREAT RATIO  19.0 22.9 19.6 14.0 11.8 7.8 10.5   CALCIUM mg/dL 9.2 8.7 8.8 8.5* 8.4* 8.1* 8.2*   ALK PHOS U/L  --   --   --   --   --  38*  --    TOTAL PROTEIN g/dL  --   --  6.4  --   --  5.6*  --    ALT (SGPT) U/L  --   --   --   --   --  57*  --    AST (SGOT) U/L  --   --   --   --   --  170*  --    BILIRUBIN mg/dL  --   --   --   --   --  0.4  --    ALBUMIN g/dL  --   --   --   --   --  3.30*  --    GLOBULIN gm/dL  --   --   --   --   --  2.3  --      Estimated Creatinine Clearance: 72.7 mL/min (by C-G formula based on SCr of 0.58 mg/dL).      Results from last 7 days   Lab Units 08/28/22  0553   WBC 10*3/mm3 4.73   RBC 10*6/mm3 3.25*   HEMOGLOBIN g/dL 10.0*   HEMATOCRIT % 30.2*   MCV fL 92.9   MCH pg 30.8   MCHC g/dL 33.1   RDW % 13.1   RDW-SD fl 44.1   MPV fL 10.1   PLATELETS 10*3/mm3 155   NEUTROS ABS 10*3/mm3 2.79   EOS ABS 10*3/mm3 0.05   BASOS ABS 10*3/mm3 0.05         Results from last 7 days   Lab Units 08/30/22  0650 08/29/22  1546 08/28/22  0553   CK TOTAL U/L 1,878* 3,151* 5,181*                 Results from last 7 days   Lab Units 08/31/22  0533   INR  1.09     Microbiology Results (last 10 days)     Procedure Component Value - Date/Time    Tissue / Bone Culture - Tissue, Lung, L [[id number]] Collected: 09/01/22 1520    Lab Status: Preliminary  result Specimen: Tissue from Lung, L Updated: 09/02/22 0956     Tissue Culture No growth     Gram Stain No WBCs or organisms seen    AFB Culture - Body Fluid, Pleural Cavity [[id number]] Collected: 08/31/22 0912    Lab Status: Preliminary result Specimen: Body Fluid from Pleural Cavity Updated: 09/01/22 1509     AFB Stain No acid fast bacilli seen on direct smear    Body Fluid Culture - Body Fluid, Pleural Cavity [[id number]] Collected: 08/31/22 0912    Lab Status: Preliminary result Specimen: Body Fluid from Pleural Cavity Updated: 09/02/22 0918     Body Fluid Culture No growth at 2 days     Gram Stain No WBCs or organisms seen    COVID PRE-OP / PRE-PROCEDURE SCREENING ORDER (NO ISOLATION) - Swab, Nasopharynx [[id number]]  (Normal) Collected: 08/24/22 2043    Lab Status: Final result Specimen: Swab from Nasopharynx Updated: 08/24/22 2114    Narrative:      The following orders were created for panel order COVID PRE-OP / PRE-PROCEDURE SCREENING ORDER (NO ISOLATION) - Swab, Nasopharynx.  Procedure                               Abnormality         Status                     ---------                               -----------         ------                     COVID-19,[facility]-HOUSE...[[id number]]  Normal              Final result                 Please view results for these tests on the individual orders.    COVID-19,[facility]-HOU[facility] CEPHEID/KYLE NP SWAB IN TRANSPORT MEDIA 8-12 HR TAT - Swab, Nasopharynx [[id number]]  (Normal) Collected: 08/24/22 2043    Lab Status: Final result Specimen: Swab from Nasopharynx Updated: 08/24/22 2114     COVID19 Not Detected    Narrative:      Fact sheet for providers: https://www.fda.gov/media/702733/download    Fact sheet for patients: https://www.fda.gov/media/928113/download    Test performed by PCR.    Blood Culture - Blood, Arm, Left [[id number]]  (Normal) Collected: 08/24/22 1542    Lab Status: Final result Specimen: Blood from Arm, Left Updated: 08/29/22 1604     Blood Culture No  growth at 5 days              amLODIPine, 2.5 mg, Oral, Q24H  aspirin, 324 mg, Oral, Once  atorvastatin, 20 mg, Oral, Daily  budesonide-formoterol, 2 puff, Inhalation, BID - RT  cholecalciferol, 400 Units, Oral, Daily  donepezil, 5 mg, Oral, Daily  hydrocortisone-bacitracin-zinc oxide-nystatin, 1 application, Topical, BID  levothyroxine, 125 mcg, Oral, Q AM  nicotine, 1 patch, Transdermal, Q24H  senna-docusate sodium, 2 tablet, Oral, BID  sodium chloride, 10 mL, Intravenous, Q12H      hold, 1 each        Diagnostics:  CT Head Without Contrast    Result Date: 8/24/2022  CT HEAD WO CONTRAST-, CT CERVICAL SPINE WO CONTRAST-  INDICATIONS: Trauma  TECHNIQUE: Radiation dose reduction techniques were utilized, including automated exposure control and exposure modulation based on body size. Noncontrast head CT, cervical spine CT  COMPARISON: None available  FINDINGS:  Head CT:  No acute intracranial hemorrhage, midline shift or mass effect. No acute territorial infarct is identified.  Prominent periventricular hypodensities suggest chronic small vessel ischemic change in a patient this age.  Arterial calcifications are seen at the base of the brain.  Ventricles, cisterns, cerebral sulci are unremarkable for patient age.  Likely mucous retention cyst or polyp in right maxillary sinus. Minimal paranasal sinus mucosal thickening. The visualized paranasal sinuses, orbits, mastoid air cells are otherwise unremarkable.    Cervical spine CT:  No acute fracture or malalignment is identified.  Facet and uncovertebral joint hypertrophy contribute to neuroforaminal narrowing, more prominent on the right at C5/6. Disc osteophyte complex appears to result in mild central stenosis at C5/6.  Bilateral carotid arterial calcification is present.         No acute intracranial hemorrhage or hydrocephalus. If there is further clinical concern, MRI could be considered for further evaluation.  This report was finalized on 8/24/2022 5:18 PM by  Dr. Toby Lang M.D.      CT Cervical Spine Without Contrast    Result Date: 8/24/2022  CT HEAD WO CONTRAST-, CT CERVICAL SPINE WO CONTRAST-  INDICATIONS: Trauma  TECHNIQUE: Radiation dose reduction techniques were utilized, including automated exposure control and exposure modulation based on body size. Noncontrast head CT, cervical spine CT  COMPARISON: None available  FINDINGS:  Head CT:  No acute intracranial hemorrhage, midline shift or mass effect. No acute territorial infarct is identified.  Prominent periventricular hypodensities suggest chronic small vessel ischemic change in a patient this age.  Arterial calcifications are seen at the base of the brain.  Ventricles, cisterns, cerebral sulci are unremarkable for patient age.  Likely mucous retention cyst or polyp in right maxillary sinus. Minimal paranasal sinus mucosal thickening. The visualized paranasal sinuses, orbits, mastoid air cells are otherwise unremarkable.    Cervical spine CT:  No acute fracture or malalignment is identified.  Facet and uncovertebral joint hypertrophy contribute to neuroforaminal narrowing, more prominent on the right at C5/6. Disc osteophyte complex appears to result in mild central stenosis at C5/6.  Bilateral carotid arterial calcification is present.         No acute intracranial hemorrhage or hydrocephalus. If there is further clinical concern, MRI could be considered for further evaluation.  This report was finalized on 8/24/2022 5:18 PM by Dr. Toby Lang M.D.      MRI Brain With & Without Contrast    Result Date: 8/26/2022  MRI OF THE BRAIN WITH AND WITHOUT CONTRAST 08/26/2022  CLINICAL HISTORY: Mental status change, unknown cause, cognitive decline. Patient has history of a fall at home 2 days ago with generalized weakness and also reported history of breast cancer.  TECHNIQUE: Axial T1, FLAIR, fat-suppressed T2, axial diffusion and gradient echo T2, sagittal T1 and postcontrast axial fat-suppressed T1  and coronal and sagittal T1 and thin cut 1.5 mm thick axial postcontrast spoiled gradient echo T1-weighted images were obtained of the entire head.  COMPARISON: There are no prior MRIs of the head for comparison. This study is correlated to a noncontrast head CT 2 days ago on 08/24/2022.  FINDINGS: There are extensive patchy and confluent areas of T2 high signal throughout the periventricular and subcortical white matter of the cerebral hemispheres and there is very extensive confluent T2 high signal throughout the central pontine white matter and the findings are consistent with moderate to severe small vessel disease involving both the cerebral and central pontine white matter. The remainder of the brain parenchyma is normal in signal intensity. Specifically no diffusion weighted abnormality is seen with no acute infarct identified on the gradient echo T2-weighted images. I see no acute or old blood breakdown products intracranially. There is mild cerebral atrophy. The lateral and 3rd ventricles are minimally prominent size felt to be on the basis of central volume loss or atrophy. I see no mass effect, no midline shift, and no extra-axial fluid collections are identified. No abnormal areas of enhancement are seen in the head. The calvarium and skull base demonstrate normal marrow signal intensity. The orbits are unremarkable. There is prominent T2 hyperintensity compatible with swelling in the scalp overlying the posterior superior and superior lateral left parietal bone and tracking over the inferior lateral left parietal bone and over the lateral left occipital and suboccipital scalp.      1. No acute intracranial abnormality is seen. 2. There is extensive patchy and confluent T2 high signal throughout the cerebral and central pontine white matter consistent with moderate to severe small vessel disease and there is mild diffuse cerebral atrophy. The and lateral and 3rd ventricles are mildly prominent in size  felt to be on the basis of central volume loss or atrophy. 3. There is extensive T2 high signal compatible with edema and swelling in the scalp overlying the posterior superior and superior lateral left parietal bone tracking posterior inferiorly over the lateral left occipital and suboccipital scalp likely related to scalp swelling or scalp hematoma from recent head trauma. Correlation with clinical history is suggested. 4. The remainder of the MRI of the brain is normal with no acute infarct seen.        XR Chest 1 View    Result Date: 8/25/2022  XR CHEST 1 VW-  HISTORY: Female who is 82 years-old, post biopsy evaluation  TECHNIQUE: Frontal views of the chest  COMPARISON: 08/25/2022 at 1529 hours  FINDINGS: The heart is mildly enlarged. Aorta is tortuous. Pulmonary vasculature is unremarkable. Old granulomatous disease is apparent. Persistent opacity at the left base appears similar to prior exam. No pleural effusion, or definite pneumothorax. Otherwise stable.      No definite pneumothorax is identified.  This report was finalized on 8/25/2022 5:54 PM by Dr. Toby Lang M.D.      XR Chest 1 View    Result Date: 8/25/2022  XR CHEST 1 VW-  08/25/2022  HISTORY: Status post left lung biopsy.  2 images are submitted.  There is no evidence of pneumothorax. Vague nodular density is seen in the left lung base. Patient is rotated to the left. Heart size appears mildly enlarged.      1. No complications are seen following left lung biopsy.  This report was finalized on 8/25/2022 3:44 PM by Dr. Jerry Brasher M.D.      XR Chest 1 View    Result Date: 8/24/2022  XR CHEST 1 VW-  HISTORY: Female who is 82 years-old,  chest pain  TECHNIQUE: Frontal view of the chest  COMPARISON: 02/22/2019  FINDINGS: The heart is enlarged. Aorta is tortuous, calcified. Pulmonary vasculature is unremarkable. Within granulomatous disease is seen. No focal pulmonary consolidation, pleural effusion, or pneumothorax. No acute osseous  process.      No evidence for acute pulmonary process. Cardiomegaly. Tortuous aorta. Follow-up as clinically indicated.  This report was finalized on 8/24/2022 4:26 PM by Dr. Toby Lang M.D.      CT Needle Biopsy Lung    Result Date: 8/25/2022  CT-GUIDED BIOPSY OF LEFT LOWER LOBE MASS 08/25/2022  HISTORY: Left lower lobe mass.  After signed informed consent was obtained patient was prepped and draped in the right posterior oblique position. Lidocaine was used for local anesthesia.  The patient appeared somewhat confused and had difficulty following commands including difficulty with breath-holding. An 18-gauge biopsy device was introduced for left lung biopsy. When the needle was introduced there was a small amount of pulmonary hemorrhage surrounding the nodule. Only scant biopsy material was obtained given the presence of the hemorrhage. No pneumothorax is seen.      1. Limited biopsy material could be obtained due to patient's inability to breath-hold and some visualized pulmonary hemorrhage around the nodule. 2. There were no complications.  Radiation dose reduction techniques were utilized, including automated exposure control and exposure modulation based on body size.  This report was finalized on 8/25/2022 4:05 PM by Dr. Jerry Brasher M.D.      CT Angiogram Chest    Result Date: 8/24/2022  CT ANGIOGRAM CHEST-  Radiation dose reduction techniques were utilized, including automated exposure control and exposure modulation based on body size.  CT scan of the chest performed with intravenous contrast, pulmonary embolus protocol to include coronal, sagittal and three-dimensional reconstruction.  Clinical: Hypoxia, found down  FINDINGS: Mild dilatation of the ascending thoracic aorta measuring 4 cm in diameter. The arch measures 3.6 cm in diameter and the mid descending measures 3.3 cm in diameter. There is atherosclerotic calcification of the aorta.  There is mild cardiac enlargement, trace pericardial  thickening/effusion. Small sliding-type hiatal hernia.  No pulmonary embolus. Calcified benign granuloma within the right lower lobe with associated calcified benign right hilar lymph nodes.  Located at the base of the left lower lobe there is a spiculated, suspicious mass which measures 2.3 x 2.2 x 1.6 cm, worrisome for primary bronchogenic carcinoma. There is a 7 mm subpleural, noncalcified left lower lobe nodule seen on image 75. No mediastinal or hilar adenopathy.  There appears to be subpleural atelectasis at the base of the right lower lobe along the costophrenic sulcus. There is bullae formation compatible with emphysema.  Limited images through the upper abdomen demonstrate normal adrenal glands. No lytic or sclerotic bone lesion.  CONCLUSION: 1. Suspicious spiculated left lower lobe lung mass worrisome for primary bronchogenic carcinoma. Indeterminant 7 mm pleural-based left lower lobe nodule also seen. No mediastinal or hilar adenopathy. 2. Mild dilatation of the ascending thoracic aorta, 4 cm in diameter. 3. Trace pericardial thickening/effusion. 4. Hiatal hernia. 5. Emphysema. 6. No pulmonary embolus.   This report was finalized on 8/24/2022 7:59 PM by Dr. Homero Gabriel M.D.      XR Spine Lumbar Complete 4+VW    Result Date: 8/24/2022  XR HIP W OR WO PELVIS 2-3 VIEW LEFT-, XR SPINE LUMBAR COMPLETE 4+VW-  INDICATIONS: Trauma  TECHNIQUE: Frontal view the pelvis, 2 views of the left hip, 5 views of the lumbar spine  COMPARISON: Hip x-ray from 02/23/2019  FINDINGS:  No acute fracture is identified.  Lumbar alignment is in range of normal. Multilevel endplate spurring and lower lumbar facet arthropathy are evident. Disc space narrowing is seen at L5/S1, mildly at L4/5. Arterial calcification is conspicuous, and the upper abdominal aorta appears mildly aneurysmal; this appearance could be exaggerated by technique, and dedicated aortic imaging can be obtained for further evaluation.  Left hip arthroplasty  hardware appears intact. Right hip joint space appears preserved. The pelvic ring appears intact. No dislocation.  Follow-up/further evaluation can be obtained as indications persist.       As described.    This report was finalized on 8/24/2022 4:30 PM by Dr. Toby Lang M.D.      XR Hip With or Without Pelvis 2 - 3 View Left    Result Date: 8/24/2022  XR HIP W OR WO PELVIS 2-3 VIEW LEFT-, XR SPINE LUMBAR COMPLETE 4+VW-  INDICATIONS: Trauma  TECHNIQUE: Frontal view the pelvis, 2 views of the left hip, 5 views of the lumbar spine  COMPARISON: Hip x-ray from 02/23/2019  FINDINGS:  No acute fracture is identified.  Lumbar alignment is in range of normal. Multilevel endplate spurring and lower lumbar facet arthropathy are evident. Disc space narrowing is seen at L5/S1, mildly at L4/5. Arterial calcification is conspicuous, and the upper abdominal aorta appears mildly aneurysmal; this appearance could be exaggerated by technique, and dedicated aortic imaging can be obtained for further evaluation.  Left hip arthroplasty hardware appears intact. Right hip joint space appears preserved. The pelvic ring appears intact. No dislocation.  Follow-up/further evaluation can be obtained as indications persist.       As described.    This report was finalized on 8/24/2022 4:30 PM by Dr. Toby Lang M.D.              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]  Yes   • Hypoxia [R09.02]  Yes   • Tobacco abuse [Z72.0]  Yes   • Recurrent  falls [R29.6]  Not Applicable   • Dementia without behavioral disturbance (HCC) [F03.90]  Yes   • Impaired glucose tolerance [R73.02]  Yes   • Essential hypertension [I10]  Yes   • Acquired hypothyroidism [E03.9]  Yes   • HLD (hyperlipidemia) [E78.5]  Yes   • Vitamin D deficiency [E55.9]  Yes      Resolved Hospital Problems    Diagnosis Date Resolved POA   • **Non-traumatic rhabdomyolysis [M62.82] 09/02/2022 Yes   • Scalp hematoma, initial encounter [S00.03XA] 09/02/2022 Yes   • Leukocytosis [D72.829] 09/02/2022 Yes   Postbiopsy chest x-ray no pneumothorax does not look like she has had any significant reaccumulation of the pleural fluid.        Assessment & Plan     1. Spiculated left lower lobe lung mass this is very worrisome for carcinoma.    Status post repeat CT-guided biopsy 9/1.  Await results.  If this is nondiagnostic I think we can go with previous plan of PET scan and outpatient follow-up with one of my associates that does navigational bronchoscopy if PET scan does not show some other more amenable lesion.  But hopefully the repeat biopsy will be diagnostic.  2. New left pleural effusion.  This has developed since her 8/24 scan.  It is moderate in size.  It was a little bloody and may have just been reactive effusion to some bleeding with her prior lung biopsy attempt  3. Rhabdomyolysis CK slowly normalizing  4. Dementia  5. Hypertension  6. Acquired hypothyroidism  7. Hyperlipidemia  8. Impaired glucose tolerance  9. Left elbow abrasion.  This is looking better.              Plan for disposition:    Toño Gonzalez Jr, MD  09/02/22  12:40 EDT    Time:

## 2022-09-02 NOTE — PROGRESS NOTES
Subjective .     REASONS FOR FOLLOWUP:      1. Newly left lower lobe lung mass, biopsy done August 25, 2022    2.  Breast cancer history: Followed by Earlene Dey  1. 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 1/11/2018: Neoadjuvant letrozole prescribed, started in 2/2018   - switched to anastrozole in 1/2022   2. 2/28/2018: Right lumpectomy (Dr. Stevens)  3. 2/28/2018: Left lumpectomy ()  3. 1/11/2017: Radiation evaluation - ok to forego radiation Dr. Rocha)    Screening:  Mammogram poonam screening- 12/23/2021- no suspicious findings   DEXA: 6/21/2021 - osteopenia multiple sites T-score -2.3 right femoral neck, T-score -2.1 total right hip,T-score -1.7 lumbar spine   12/16/19- -2.7 Osteoporosis - declines bone modifying agents  Colonoscopy: never, refuses (had colon guard through her pcp)       Interval history:    August 28, 2022:  Patient with spiculated left lower lobe lung mass worrisome for carcinoma.  Difficult to reach bronchoscopically.  Pulmonary is wondering if navigational bronchoscopy could be considered.  Certainly repeat CT-guided biopsy since the first biopsy was negative could be considered  Dr. Gonzalez discussed in length with patient's son regarding getting PET scan as outpatient and if highly active then to questionably consider just radiation versus repeat biopsy and patient's son wanted repeat biopsy.  May be worthwhile considering thoracic surgery consult.  CT abdomen pelvis pending and bone scan pending.  MRI brain atrophy.    Doubt if there is any distant metastasis given a small  lesion 2.  2 x 2.3 x 1.6 cm.    8/29/2022  MRI brain was obtained which did not show any acute intracranial abnormality.  There was moderate to severe small vessel disease and mild diffuse cerebral atrophy.  There was extensive edema and swelling of the scalp overlying the posterior superior lateral aspect of the parietal bone where she likely has a scalp hematoma    9/1/2022  Pleural fluid cytology still pending.  Repeat CT-guided biopsy planned today.  She denies any new complaints.  Remains confused.    9/2/2022  Patient remains confused.  She is appearing comfortable in bed.  She underwent a CT-guided biopsy of the left lung mass on 9/1/2022  Results pending  Pleural fluid cytology negative for malignant cells.  Predominantly blood with reactive mesothelial macrophages and mixed inflammation.      HISTORY OF PRESENT ILLNESS:       patient is a 82-year-old female who was admitted on August 24, 2022 with multiple medical problems hypertension hyperlipidemia diabetes, left femoral neck fracture s/p total left hip arthroplasty who came to the ER as she was found down.  Her daughter-in-law and son at the bedside report that they spoke to her yesterday at 4 PM when their brother attempted to call patient she did not answer on the phone at 8 PM and when they went to check on her she was found on the floor.  When she came to the ER she was oriented to her baseline.  Patient family states that she fell about 2 weeks ago and did not want to be evaluated at that time.  CT head head was done in the emergency room which showed no acute intracranial hemorrhage or hydrocephalus.  Chest x-ray showed no evidence of pneumonia there was cardiomegaly.  CT angiogram of the chest was done there was mild dilation of the ascending thoracic aorta measuring 4 cm.  The arch measured 3.6 cm.  In the descending was 3.3 cm.  There is mild cardiac enlargement.  Located in the base of the left lower lobe is a spiculated suspicious mass 2.2 x  2.3 x 1.6 cm worrisome for primary bronchogenic carcinoma.  There is a 7 mm subpleural noncalcified left lower lobe lung nodule.  There is no mediastinal or hilar adenopathy.  There appears to be subpleural atelectasis at the base of the right lower lobe.  There is no lytic or bone lesion seen.  So there is suspicious spiculated left lower lobe lung mass worrisome for probable primary bronchogenic carcinoma.  There is a 7 mm pleural-based left lower lobe nodule also.  X-ray of the hip was done which showed left hip arthroplasty hardware was intact.  Right hip joint space is preserved.     Her hemoglobin on admission was 11.5 white count of 7.45 platelet of 166.  She had 62% neutrophils and 22% monocytes.  CPK was elevated at 4453.  TSH was 11.5.  Creatinine was 0.65 and liver function tests were mildly elevated with ALT of 36 and AST of 110 with a total bilirubin of 0.4         Past Medical History:   Diagnosis Date   • Breast cancer (HCC)    • Cancer (HCC)    • Cataracts, bilateral    • Hyperlipidemia    • Hypertension    • Hypothyroidism    • Uterine prolapse        ONCOLOGIC HISTORY:  (History from previous dates can be found in the separate document.)    No current facility-administered medications on file prior to encounter.     Current Outpatient Medications on File Prior to Encounter   Medication Sig Dispense Refill   • acetaminophen (TYLENOL) 325 MG tablet Take 2 tablets by mouth Every 4 (Four) Hours As Needed for Mild Pain .     • calcium carb-cholecalciferol 600-800 MG-UNIT tablet Take 2 tablets by mouth Daily.     • donepezil (ARICEPT) 5 MG tablet Take 5 mg by mouth Daily.     • letrozole (FEMARA) 2.5 MG tablet Take 2.5 mg by mouth Daily.     • levothyroxine (SYNTHROID, LEVOTHROID) 112 MCG tablet Take 112 mcg by mouth Daily.     • Multiple Vitamins-Minerals (PRESERVISION AREDS) capsule Take 1 tablet by mouth 2 (two) times a day.       • Omega-3 Fatty Acids (FISH OIL) 1000 MG capsule capsule Take 1,000 mg  by mouth 2 (Two) Times a Day.     • simvastatin (ZOCOR) 40 MG tablet Take 40 mg by mouth Daily.     • Vitamin D, Cholecalciferol, (CHOLECALCIFEROL) 400 units tablet Take 400 Units by mouth Daily.     • bisacodyl (DULCOLAX) 5 MG EC tablet Take 2 tablets by mouth Daily As Needed for Constipation.     • estradiol (ESTRING) 2 MG vaginal ring Insert 2 mg into the vagina Every 3 (Three) Months. follow package directions     • Oxyquinoline-Sod Lauryl Sulf 0.025-0.01 % gel Insert  into the vagina 3 (Three) Times a Week.     • silver sulfadiazine (SILVADENE, SSD) 1 % cream Apply 1 application topically to the appropriate area as directed As Needed for Wound Care.     • triamcinolone (KENALOG) 0.1 % cream Apply 1 application topically to the appropriate area as directed As Needed.         ALLERGIES:     Allergies   Allergen Reactions   • Latex, Natural Rubber Rash       Social History     Socioeconomic History   • Marital status: Unknown   Tobacco Use   • Smoking status: Current Every Day Smoker     Packs/day: 0.50     Years: 20.00     Pack years: 10.00     Types: Cigarettes   • Smokeless tobacco: Never Used   Vaping Use   • Vaping Use: Never used   Substance and Sexual Activity   • Alcohol use: Yes     Comment: SOCIALLY    • Drug use: No   • Sexual activity: Defer         Cancer-related family history includes Breast cancer in her mother; Lung cancer in her father.     Review of Systems  A comprehensive 14 point review of systems was performed and was negative except as mentioned.    Objective      Vitals:    09/02/22 0515 09/02/22 0917 09/02/22 1021 09/02/22 1022   BP: 126/67 114/71     BP Location: Left arm Left arm     Patient Position: Lying Lying     Pulse: 75 79 76 76   Resp: 18 18 20 20   Temp: 97.2 °F (36.2 °C) 97.5 °F (36.4 °C)     TempSrc: Oral Oral     SpO2: 92% 91% 92%    Weight:       Height:         No flowsheet data found.    Physical Exam  Vitals reviewed.   Constitutional:       Appearance: Normal  appearance.   HENT:      Head: Normocephalic.      Nose: Nose normal.   Cardiovascular:      Rate and Rhythm: Normal rate and regular rhythm.      Pulses: Normal pulses.   Pulmonary:      Effort: Pulmonary effort is normal.      Breath sounds: Normal breath sounds.   Skin:     General: Skin is warm.   Neurological:      General: No focal deficit present.         I have reexamined the patient and the results are consistent with the previously documented exam. Sandra Cespedes MD     RECENT LABS:  Hematology No results found for: WBC, RBC, HGB, HCT, PLT     Assessment & Plan   *Rhabdomyolysis with elevated CPK  · Patient was seen on the floor by the family, unsure how long  · Per the family patient had fall 2 weeks ago.  · In the ER she was found to have elevated CPK concerning for rhabdomyolysis but the creatinine was normal  · She was started on IV fluids  · She was tested for COVID-19 which is negative.  · CT head was negative, MRI brain negative, chest x-ray negative but CT scan angiogram of the chest did not show pulmonary embolism but showed a left lower lobe lung nodule 2.3 x 2.2 x 1.6 cm.  · No CT from today  · Creatinine normal     *New lung nodule in the left lower lobe 2.3 x 2.2 x 1.6 cm s/p biopsy today.  Path pending  · CT biopsy of the lung nodule negative  · However patient is getting repeat biopsy as they concerned that it is malignancy.  · MRI brain showed no intracranial abnormality.  There is moderate mild diffuse cerebral atrophy.  · Pulmonary saw patient and discussed different options with patient's son but patient's son does want repeat biopsy.  · Bone scan 8/28/2022 negative  · CT of the abdomen and pelvis shows an approximately 3.4 cm spiculated mass of the left lower lobe.  Left pleural effusion measuring 3.2 cm.  No evidence of metastatic disease in the abdomen  ·  Pulmonary on board.  Left-sided thoracentesis performed 8/31/2022  · Pleural fluid cytology benign  · CT-guided biopsy of the  lung performed 9/1/2022-results       *Dementia   B12, folic acid normal  Neurology consulted  TSH elevated     *Leukocytosis   Resolved     *DVT prophylaxis on Lovenox    Plan    · Pleural fluid cytology negative  · Seen by cardiothoracic surgery and patient thought to be not a surgical candidate  · CT of the abdomen and pelvis without any evidence of metastatic  · Bone scan negative  · Await CT-guided biopsy results..    Sandra Cespedes MD                Cc:  Duncan Brian MD

## 2022-09-02 NOTE — PROGRESS NOTES
Continued Stay Note  [facility]     Patient Name: Kennedi Cardenas  MRN: [id number]  Today's Date: 9/2/2022    Admit Date: 8/24/2022     Discharge Plan     Row Name 09/02/22 1653       Plan    Plan [facility]    Plan Comments Per Saniya with [facility], patient is able to admit over the weeekend. Bed available    Row Name 09/02/22 1419       Plan    Plan [facility] Pre-cert obtained on 8/31    Plan Comments Msg sent to [facility] with [facility] regarding pre-cert and if bed available over the weekend.               Discharge Codes    No documentation.               Expected Discharge Date and Time     Expected Discharge Date Expected Discharge Time    Sep 3, 2022             Vika Santizo RN

## 2022-09-02 NOTE — PLAN OF CARE
Goal Outcome Evaluation:  Plan of Care Reviewed With: patient        Progress: no change  Outcome Evaluation: VSS. O2 2L/min, No respiratory distress noted. Tylenol given for pain, turned to sides, had BM, moderate in amount. Slept  at long intervals.

## 2022-09-03 VITALS
WEIGHT: 152.34 LBS | HEART RATE: 101 BPM | HEIGHT: 64 IN | DIASTOLIC BLOOD PRESSURE: 81 MMHG | OXYGEN SATURATION: 95 % | TEMPERATURE: 96.9 F | BODY MASS INDEX: 26.01 KG/M2 | SYSTOLIC BLOOD PRESSURE: 127 MMHG | RESPIRATION RATE: 16 BRPM

## 2022-09-03 LAB
ANION GAP SERPL CALCULATED.3IONS-SCNC: 9 MMOL/L (ref 5–15)
BUN SERPL-MCNC: 12 MG/DL (ref 8–23)
BUN/CREAT SERPL: 20 (ref 7–25)
CALCIUM SPEC-SCNC: 9.1 MG/DL (ref 8.6–10.5)
CHLORIDE SERPL-SCNC: 98 MMOL/L (ref 98–107)
CO2 SERPL-SCNC: 29 MMOL/L (ref 22–29)
CREAT SERPL-MCNC: 0.6 MG/DL (ref 0.57–1)
DEPRECATED RDW RBC AUTO: 45.8 FL (ref 37–54)
EGFRCR SERPLBLD CKD-EPI 2021: 90.3 ML/MIN/1.73
ERYTHROCYTE [DISTWIDTH] IN BLOOD BY AUTOMATED COUNT: 13.7 % (ref 12.3–15.4)
GLUCOSE SERPL-MCNC: 102 MG/DL (ref 65–99)
HCT VFR BLD AUTO: 33.2 % (ref 34–46.6)
HGB BLD-MCNC: 11.1 G/DL (ref 12–15.9)
MCH RBC QN AUTO: 30.8 PG (ref 26.6–33)
MCHC RBC AUTO-ENTMCNC: 33.4 G/DL (ref 31.5–35.7)
MCV RBC AUTO: 92.2 FL (ref 79–97)
PLATELET # BLD AUTO: 368 10*3/MM3 (ref 140–450)
PMV BLD AUTO: 9.6 FL (ref 6–12)
POTASSIUM SERPL-SCNC: 4 MMOL/L (ref 3.5–5.2)
RBC # BLD AUTO: 3.6 10*6/MM3 (ref 3.77–5.28)
SODIUM SERPL-SCNC: 136 MMOL/L (ref 136–145)
WBC NRBC COR # BLD: 8.03 10*3/MM3 (ref 3.4–10.8)

## 2022-09-03 PROCEDURE — 94799 UNLISTED PULMONARY SVC/PX: CPT

## 2022-09-03 PROCEDURE — 94664 DEMO&/EVAL PT USE INHALER: CPT

## 2022-09-03 PROCEDURE — 25010000002 ENOXAPARIN PER 10 MG: Performed by: STUDENT IN AN ORGANIZED HEALTH CARE EDUCATION/TRAINING PROGRAM

## 2022-09-03 PROCEDURE — 99232 SBSQ HOSP IP/OBS MODERATE 35: CPT | Performed by: INTERNAL MEDICINE

## 2022-09-03 PROCEDURE — 85027 COMPLETE CBC AUTOMATED: CPT | Performed by: STUDENT IN AN ORGANIZED HEALTH CARE EDUCATION/TRAINING PROGRAM

## 2022-09-03 PROCEDURE — 80048 BASIC METABOLIC PNL TOTAL CA: CPT | Performed by: INTERNAL MEDICINE

## 2022-09-03 RX ORDER — NICOTINE 21 MG/24HR
1 PATCH, TRANSDERMAL 24 HOURS TRANSDERMAL EVERY 24 HOURS
Qty: 30 PATCH | Refills: 1 | Status: SHIPPED | OUTPATIENT
Start: 2022-09-03 | End: 2022-10-04 | Stop reason: HOSPADM

## 2022-09-03 RX ORDER — ENOXAPARIN SODIUM 100 MG/ML
40 INJECTION SUBCUTANEOUS EVERY 24 HOURS
Status: DISCONTINUED | OUTPATIENT
Start: 2022-09-03 | End: 2022-09-03 | Stop reason: HOSPADM

## 2022-09-03 RX ORDER — BUDESONIDE AND FORMOTEROL FUMARATE DIHYDRATE 160; 4.5 UG/1; UG/1
2 AEROSOL RESPIRATORY (INHALATION)
Qty: 1 EACH | Refills: 1 | Status: SHIPPED | OUTPATIENT
Start: 2022-09-03 | End: 2022-10-03

## 2022-09-03 RX ORDER — LEVOTHYROXINE SODIUM 0.12 MG/1
125 TABLET ORAL
Qty: 30 TABLET | Refills: 1 | Status: SHIPPED | OUTPATIENT
Start: 2022-09-04 | End: 2022-11-03

## 2022-09-03 RX ORDER — ATORVASTATIN CALCIUM 20 MG/1
20 TABLET, FILM COATED ORAL DAILY
Qty: 30 TABLET | Refills: 1 | Status: SHIPPED | OUTPATIENT
Start: 2022-09-04 | End: 2022-11-03

## 2022-09-03 RX ADMIN — DONEPEZIL HYDROCHLORIDE 5 MG: 5 TABLET, FILM COATED ORAL at 08:20

## 2022-09-03 RX ADMIN — Medication 10 ML: at 08:20

## 2022-09-03 RX ADMIN — ACETAMINOPHEN 650 MG: 325 TABLET, FILM COATED ORAL at 03:02

## 2022-09-03 RX ADMIN — LEVOTHYROXINE SODIUM 125 MCG: 0.12 TABLET ORAL at 08:19

## 2022-09-03 RX ADMIN — CHOLECALCIFEROL TAB 10 MCG (400 UNIT) 400 UNITS: 10 TAB at 08:19

## 2022-09-03 RX ADMIN — DOCUSATE SODIUM 50MG AND SENNOSIDES 8.6MG 2 TABLET: 8.6; 5 TABLET, FILM COATED ORAL at 08:19

## 2022-09-03 RX ADMIN — BUDESONIDE AND FORMOTEROL FUMARATE DIHYDRATE 2 PUFF: 160; 4.5 AEROSOL RESPIRATORY (INHALATION) at 08:42

## 2022-09-03 RX ADMIN — ATORVASTATIN CALCIUM 20 MG: 20 TABLET, FILM COATED ORAL at 08:19

## 2022-09-03 RX ADMIN — Medication 10 ML: at 03:02

## 2022-09-03 RX ADMIN — AMLODIPINE BESYLATE 2.5 MG: 2.5 TABLET ORAL at 08:19

## 2022-09-03 RX ADMIN — ZINC OXIDE 1 APPLICATION: 200 OINTMENT TOPICAL at 08:26

## 2022-09-03 RX ADMIN — ENOXAPARIN SODIUM 40 MG: 100 INJECTION SUBCUTANEOUS at 12:21

## 2022-09-03 NOTE — PROGRESS NOTES
Name: Kennedi Cardensa ADMIT: 2022   : 1940  PCP: Duncan Garcia MD    MRN: [id number] LOS: 9 days   AGE/SEX: 82 y.o. female  ROOM: [facility]     Subjective   Subjective    Without complaints this morning.  Son is in room and case discussed with him.    Review of Systems    Neg for CP, N/V/D    Objective   Objective   Vital Signs  Temp:  [96.7 °F (35.9 °C)-97.1 °F (36.2 °C)] 96.9 °F (36.1 °C)  Heart Rate:  [] 101  Resp:  [16-18] 16  BP: (117-132)/(70-81) 127/81  SpO2:  [92 %-96 %] 95 %  on  Flow (L/min):  [1.5-2] 1.5;   Device (Oxygen Therapy): nasal cannula  Body mass index is 26.15 kg/m².  Physical Exam  Constitutional:       Appearance: Normal appearance.   Cardiovascular:      Rate and Rhythm: Normal rate and regular rhythm.      Heart sounds: No murmur heard.    No gallop.   Pulmonary:      Effort: Pulmonary effort is normal. No respiratory distress.      Breath sounds: Normal breath sounds. No wheezing.   Abdominal:      General: There is no distension.      Palpations: There is no mass.      Tenderness: There is no abdominal tenderness.   Neurological:      General: No focal deficit present.      Mental Status: She is alert and oriented to person, place, and time.   Psychiatric:         Mood and Affect: Mood normal.         Behavior: Behavior normal.         Results Review     I reviewed the patient's new clinical results.  Results from last 7 days   Lab Units 22  0457 22  0553   WBC 10*3/mm3 8.03 4.73   HEMOGLOBIN g/dL 11.1* 10.0*   PLATELETS 10*3/mm3 368 155     Results from last 7 days   Lab Units 22  0457 22  0530 22  0600 22  0533   SODIUM mmol/L 136 139 135* 140   POTASSIUM mmol/L 4.0 4.3 4.2 4.1   CHLORIDE mmol/L 98 98 98 101   CO2 mmol/L 29.0 30.8* 30.9* 34.5*   BUN mg/dL 12 11 11 11   CREATININE mg/dL 0.60 0.58 0.48* 0.56*   GLUCOSE mg/dL 102* 86 90 85   EGFR mL/min/1.73 90.3 91.0 95.3 91.8     Results from last 7 days   Lab Units 22  0553    ALBUMIN g/dL 3.30*   BILIRUBIN mg/dL 0.4   ALK PHOS U/L 38*   AST (SGOT) U/L 170*   ALT (SGPT) U/L 57*     Results from last 7 days   Lab Units 09/03/22  0457 09/02/22  0530 09/01/22  0600 08/31/22  0533 08/29/22  1546 08/28/22  0553   CALCIUM mg/dL 9.1 9.2 8.7 8.8   < > 8.1*   ALBUMIN g/dL  --   --   --   --   --  3.30*    < > = values in this interval not displayed.         No results found for: HGBA1C, POCGLU    XR Chest 1 View    Result Date: 9/2/2022  No appreciable pneumothorax  This report was finalized on 9/2/2022 10:13 AM by Dr. Minor Calero M.D.      XR Chest 1 View    Result Date: 9/1/2022  Possible minimal left apical pneumothorax versus rib shadow. Another chest x-ray will be performed in 2 hours to reassess.  This report was finalized on 9/1/2022 4:51 PM by Dr. Minor Calero M.D.      CT Needle Biopsy Lung    Result Date: 9/1/2022  Technically successful CT guided left lung biopsy.  Moderate sedation was provided under my direct supervision using 0.5 mg IV Versed and 25 mcg IV Fentanyl. The patient was independently monitored by a trained Department of Radiology RN using automated blood pressure, EKG, and pulse oximetry. My total intra-service time was 12 minutes.     Radiation dose reduction techniques were utilized, including automated exposure control and exposure modulation based on body size.  This report was finalized on 9/1/2022 3:40 PM by Dr. Minor Calero M.D.      Scheduled Medications  amLODIPine, 2.5 mg, Oral, Q24H  aspirin, 324 mg, Oral, Once  atorvastatin, 20 mg, Oral, Daily  budesonide-formoterol, 2 puff, Inhalation, BID - RT  cholecalciferol, 400 Units, Oral, Daily  donepezil, 5 mg, Oral, Daily  hydrocortisone-bacitracin-zinc oxide-nystatin, 1 application, Topical, BID  levothyroxine, 125 mcg, Oral, Q AM  nicotine, 1 patch, Transdermal, Q24H  senna-docusate sodium, 2 tablet, Oral, BID  sodium chloride, 10 mL, Intravenous, Q12H    Infusions  hold, 1 each    Diet  Diet  Regular       Assessment/Plan     Active Hospital Problems    Diagnosis  POA   • Small vessel disease, cerebrovascular severe [I67.9]  Yes   • Cerebral atrophy (HCC) [G31.9]  Yes   • Panlobular emphysema (HCC) [J43.1]  Yes   • Suspicious spiculated left lower lobe lung mass worrisome for primary lung cancer [R91.8]  Yes   • Acute respiratory failure with hypoxia (HCC) [J96.01]  Yes   • Traumatic rhabdomyolysis, initial encounter (HCC) [T79.6XXA]  Yes   • Vascular dementia without behavioral disturbance (HCC) [F01.50]  Yes   • 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]  Yes   • Hypoxia [R09.02]  Yes   • Tobacco abuse [Z72.0]  Yes   • Recurrent falls [R29.6]  Not Applicable   • Dementia without behavioral disturbance (HCC) [F03.90]  Yes   • Impaired glucose tolerance [R73.02]  Yes   • Essential hypertension [I10]  Yes   • Acquired hypothyroidism [E03.9]  Yes   • HLD (hyperlipidemia) [E78.5]  Yes   • Vitamin D deficiency [E55.9]  Yes      Resolved Hospital Problems    Diagnosis Date Resolved POA   • **Non-traumatic rhabdomyolysis [M62.82] 09/02/2022 Yes   • Scalp hematoma, initial encounter [S00.03XA] 09/02/2022 Yes   • Leukocytosis [D72.829] 09/02/2022 Yes       82 y.o. female admitted with Non-traumatic rhabdomyolysis.     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  -Evaluated by thoracic surgery- discussed with son that she is a poor candidate for surgical intervention  -Oncology following:     -Bone scan negative; CT A/P w/out evidence of metastatic disease     -Plan to consult Rad/Onc on Tuesday (9/6/22)     -next gen sequencing pending     -OP PET     -GOC: per son quality of life most important; considering XRT vs immunotherapy if appropriate    COPD/emphysema  Acute hypoxic respiratory failure, resolved  -cont Symbicort, PRN albuterol  -smoking cessation     Hypothyroidism  -TSH 11.5 (8/25/22)  -increased Synthroid from 112mcg to 125 mcg daily    -Recheck in 6 weeks as OP (around 10/14/22)     HTN  -Continue amlodipine 2.5 mg    Rhabdomyolysis, resolved  -in setting of fall    · Lovenox 40 mg SC daily for DVT prophylaxis.  · Full code.  · Discussed with patient.  · Anticipate discharge to SNU facility when cleared by consultants.      Jim Borrego MD  [facility]  09/03/22  11:08 EDT

## 2022-09-03 NOTE — PLAN OF CARE
Goal Outcome Evaluation:  Plan of Care Reviewed With: patient           Outcome Evaluation: VSS, given tylenol x2 for back pain, pt calling out to be repositioned numerous times through shift, purewick inuse, body bruised and scabbed up from prior fall, on falls with alarm on 1.5 LO2,

## 2022-09-03 NOTE — CASE MANAGEMENT/SOCIAL WORK
Continued Stay Note  [facility]     Patient Name: Kennedi Cardenas  MRN: [id number]  Today's Date: 9/3/2022    Admit Date: 8/24/2022     Discharge Plan     Row Name 09/03/22 1549       Plan    Plan Comments Received call from [facility] stating patient has dc orders and needs transportation. [employer] arranged for pickup @ 1600. Will provide w/c and O2. Packet delivered to unit. No additional needs noted.....PRC               Discharge Codes    No documentation.               Expected Discharge Date and Time     Expected Discharge Date Expected Discharge Time    Sep 3, 2022             Sandi Arreola RN    
Statement Selected

## 2022-09-03 NOTE — DISCHARGE SUMMARY
"    Patient Name: Kennedi Cardenas  : 1940  MRN: [id number]    Date of Admission: 2022  Date of Discharge:  9/3/2022  Primary Care Physician: Duncan Garcia MD      Chief Complaint:   Fall      Discharge Diagnoses     Active Hospital Problems    Diagnosis  POA   • Small vessel disease, cerebrovascular severe [I67.9]  Yes   • Cerebral atrophy (HCC) [G31.9]  Yes   • Panlobular emphysema (HCC) [J43.1]  Yes   • Suspicious spiculated left lower lobe lung mass worrisome for primary lung cancer [R91.8]  Yes   • Acute respiratory failure with hypoxia (HCC) [J96.01]  Yes   • Traumatic rhabdomyolysis, initial encounter (HCC) [T79.6XXA]  Yes   • Vascular dementia without behavioral disturbance (HCC) [F01.50]  Yes   • 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]  Yes   • Hypoxia [R09.02]  Yes   • Tobacco abuse [Z72.0]  Yes   • Recurrent falls [R29.6]  Not Applicable   • Dementia without behavioral disturbance (HCC) [F03.90]  Yes   • Impaired glucose tolerance [R73.02]  Yes   • Essential hypertension [I10]  Yes   • Acquired hypothyroidism [E03.9]  Yes   • HLD (hyperlipidemia) [E78.5]  Yes   • Vitamin D deficiency [E55.9]  Yes      Resolved Hospital Problems    Diagnosis Date Resolved POA   • **Non-traumatic rhabdomyolysis [M62.82] 2022 Yes   • Scalp hematoma, initial encounter [S00.03XA] 2022 Yes   • Leukocytosis [D72.829] 2022 Yes        Brief Admitting HPI     Very pleasant 83yo woman with h/o IGT, HTN, HLD, tobacco abuse, hypoT4, and notable decline in cognition since April, who was found down in her home by family this afternoon. Last known normal around 4PM yesterday. She was confused about how she got there. She was hypoxic on arrival in ER. She still can't tell me what happened. Family says she has been falling some. They say that they took her to PCP a couple months ago with concerns for possible dementia and were told she did not have dementia--but that she was started on \"medications for " "dementia, which are not helping at all\". Pt has no complaints at present. She denies N/V/D/F/C/NS/SOA/CP/palp/HA/vis changes/LUTS/rash. She is voiding well. She is hungry/thirsty right now.    Hospital Course     Pt admitted for fall at home, found to have rhabdomyolysis.  She was treated with IV fluids with resolution.  Patient also treated for acute COPD exacerbation requiring oxygen, with steroids and inhalers.  CT scan on admission showed spiculated lung nodule.  She underwent CT-guided biopsy which showed new NSCLC.  Patient was evaluated by thoracic surgery and oncology.  After discussion between patient's son and thoracic surgery, she was deemed a poor surgical candidate as family would prefer to focus on quality of life over aggressive treatments.  She will have additional work-up as an outpatient with Oncology as summarized below.  Patient will discharge to [facility] re[facility].    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  -Evaluated by thoracic surgery- discussed with son that she is a poor candidate for surgical intervention  -Oncology:     -Bone scan negative; CT A/P w/out evidence of metastatic disease     -Plan to f/u with Rad/Onc on Tuesday (9/6/22)     -next gen sequencing pending     -OP PET scan     -GOC: per son quality of life most important; considering XRT vs immunotherapy if appropriate    Hypothyroidism  -TSH 11.5 (8/25/22)  -increased Synthroid from 112mcg to 125 mcg daily   -Recheck in 6 weeks as OP (around 10/14/22)     Day of Discharge     Subjective:  Without complaints this morning, now on room air.  Ready for discharge to rehab.    Physical Exam:  Temp:  [96.7 °F (35.9 °C)-97.1 °F (36.2 °C)] 96.9 °F (36.1 °C)  Heart Rate:  [] 101  Resp:  [16-18] 16  BP: (117-132)/(70-81) 127/81  Body mass index is 26.15 kg/m².  Physical Exam  Constitutional:       Appearance: Normal appearance. "   Cardiovascular:      Rate and Rhythm: Normal rate and regular rhythm.      Heart sounds: No murmur heard.    No gallop.   Pulmonary:      Effort: Pulmonary effort is normal. No respiratory distress.      Breath sounds: Normal breath sounds. No wheezing.   Abdominal:      General: There is no distension.      Palpations: There is no mass.      Tenderness: There is no abdominal tenderness.   Neurological:      General: No focal deficit present.      Mental Status: She is alert and oriented to person, place, and time.   Psychiatric:         Mood and Affect: Mood normal.         Behavior: Behavior normal.     Consultants     Consult Orders (all) (From admission, onward)     Start     Ordered    08/28/22 1701  Inpatient Thoracic Surgery Consult  Once        Specialty:  Thoracic Surgery  Provider:  Rena Noonan MD    08/28/22 1700    08/27/22 1008  Inpatient Pulmonology Consult  Once        Specialty:  Pulmonary Disease  Provider:  Izaiah Anders MD    08/27/22 1007    08/26/22 0639  Hematology & Oncology Inpatient Consult  Once        Specialty:  Hematology and Oncology  Provider:  Kay Parker MD PhD    08/26/22 0639    08/24/22 2224  Inpatient Case Management  Consult  Once        Provider:  (Not yet assigned)    08/24/22 2224    08/24/22 2144  Inpatient Neurology Consult General  Once        Specialty:  Neurology  Provider:  Duncan Laughlin MD    08/24/22 2143 08/24/22 1939  [facility] (on-call MD unless specified) Details  Once        Specialty:  Hospitalist  Provider:  Duncan Brian MD    08/24/22 1938              Procedures     * Surgery not found *      Imaging Results (All)     Procedure Component Value Units Date/Time    XR Chest 1 View [[id number]] Collected: 09/01/22 1649     Updated: 09/02/22 1014    Narrative:      AP CHEST     HISTORY: Post left lung biopsy     COMPARISON: Earlier today     FINDINGS: There is no appreciable pneumothorax. Stable nodular opacity  in the left base  and right basilar atelectasis. Heart size stable.  Interval pleural fluid on the left       Impression:      No appreciable pneumothorax     This report was finalized on 9/2/2022 10:13 AM by Dr. Minor Calero M.D.       XR Chest 1 View [[id number]] Collected: 09/02/22 0637     Updated: 09/02/22 0937    Narrative:      AP CHEST     HISTORY: Post left lung biopsy     COMPARISON: CT biopsy images from earlier today, chest x-ray 08/25/2022     FINDINGS: There is a possible minimal left apical pneumothorax versus  rib shadow. Nodular opacity at the left lung base, which was biopsied  earlier. Mild atelectasis elsewhere in the lung bases. Heart size  stable.       Impression:      Possible minimal left apical pneumothorax versus rib shadow. Another  chest x-ray will be performed in 2 hours to reassess.     This report was finalized on 9/1/2022 4:51 PM by Dr. Minor Calero M.D.       CT Needle Biopsy Lung [[id number]] Collected: 09/01/22 1540     Updated: 09/01/22 1543    Narrative:      PROCEDURE: CT guided left lung biopsy     HISTORY: left lower lobe lung mass; T79.6XXA-Traumatic ischemia of  muscle, initial encounter; S70.02XA-Contusion of left hip, initial  encounter; R09.02-Hypoxemia     TECHNIQUE:  Radiation dose reduction techniques were utilized, including automated  exposure control and exposure modulation based on body size.     The procedural risks, benefits, and alternatives were discussed with the  patient, including risks of pneumothorax and bleeding and possibility of  chest tube placement. Informed consent was obtained.        The patient was placed in the supine position on the CT procedure table.  Axial CT scan was performed to localize the mass in the left lower lobe.  The overlying skin was prepped and draped in the usual sterile fashion.  1% buffered lidocaine was used for local anesthesia.     Next, a 19-gauge coaxial needle was advanced into the lesion under CT  guidance. A total of 4 20-gauge  core biopsies were then obtained and  sent to pathology. The needle was removed and sterile dressing applied.  No immediate complications.       Impression:      Technically successful CT guided left lung biopsy.     Moderate sedation was provided under my direct supervision using 0.5 mg  IV Versed and 25 mcg IV Fentanyl. The patient was independently  monitored by a trained Department of Radiology RN using automated blood  pressure, EKG, and pulse oximetry. My total intra-service time was 12  minutes.               Radiation dose reduction techniques were utilized, including automated  exposure control and exposure modulation based on body size.     This report was finalized on 9/1/2022 3:40 PM by Dr. Minor Calero M.D.       US Thoracentesis [[id number]] Collected: 08/31/22 0927    Specimen: Body Fluid Updated: 08/31/22 0930    Narrative:      ULTRASOUND-GUIDED LEFT THORACENTESIS.     HISTORY: Pleural effusion.     After signed informed consent was obtained the patient was prepped and  draped in the usual sterile fashion. Lidocaine was used for local  anesthesia.     Ultrasound guidance was used to place a 5 Lebanese catheter into the left  pleural fluid collection. 500 mL of bloody fluid was removed. Sample was  sent to the lab.     Confirmatory images were obtained.     Patient tolerated the procedure well with no complications.       Impression:      Ultrasound-guided left thoracentesis as described.        This report was finalized on 8/31/2022 9:27 AM by Dr. Minor Calero M.D.       MRI Brain With & Without Contrast [[id number]] Collected: 08/26/22 1228     Updated: 08/29/22 0637    Narrative:      MRI OF THE BRAIN WITH AND WITHOUT CONTRAST 08/26/2022     CLINICAL HISTORY: Mental status change, unknown cause, cognitive  decline. Patient has history of a fall at home 2 days ago with  generalized weakness and also reported history of breast cancer.      TECHNIQUE: Axial T1, FLAIR, fat-suppressed T2, axial  diffusion and  gradient echo T2, sagittal T1 and postcontrast axial fat-suppressed T1  and coronal and sagittal T1 and thin cut 1.5 mm thick axial postcontrast  spoiled gradient echo T1-weighted images were obtained of the entire  head.     COMPARISON: There are no prior MRIs of the head for comparison. This  study is correlated to a noncontrast head CT 2 days ago on 08/24/2022.     FINDINGS: There are extensive patchy and confluent areas of T2 high  signal throughout the periventricular and subcortical white matter of  the cerebral hemispheres and there is very extensive confluent T2 high  signal throughout the central pontine white matter and the findings are  consistent with moderate to severe small vessel disease involving both  the cerebral and central pontine white matter. The remainder of the  brain parenchyma is normal in signal intensity. Specifically no  diffusion weighted abnormality is seen with no acute infarct identified  on the gradient echo T2-weighted images. I see no acute or old blood  breakdown products intracranially. There is mild cerebral atrophy. The  lateral and 3rd ventricles are minimally prominent size felt to be on  the basis of central volume loss or atrophy. I see no mass effect, no  midline shift, and no extra-axial fluid collections are identified. No  abnormal areas of enhancement are seen in the head. The calvarium and  skull base demonstrate normal marrow signal intensity. The orbits are  unremarkable. There is prominent T2 hyperintensity compatible with  swelling in the scalp overlying the posterior superior and superior  lateral left parietal bone and tracking over the inferior lateral left  parietal bone and over the lateral left occipital and suboccipital  scalp.       Impression:      1. No acute intracranial abnormality is seen.  2. There is extensive patchy and confluent T2 high signal throughout the  cerebral and central pontine white matter consistent with moderate  to  severe small vessel disease and there is mild diffuse cerebral atrophy.  The lateral and 3rd ventricles are mildly prominent in size felt to be  on the basis of central volume loss or atrophy.  3. There is extensive T2 high signal compatible with edema and swelling  in the scalp overlying the posterior superior and superior lateral left  parietal bone tracking posterior inferiorly over the lateral left  occipital and suboccipital scalp likely related to scalp swelling or  scalp hematoma from recent head trauma. Correlation with clinical  history is suggested.  4. The remainder of the MRI of the brain is normal with no acute infarct  seen.     This report was finalized on 8/29/2022 6:34 AM by Dr. Wilman Penaloza M.D.       CT Abdomen Pelvis With Contrast [[id number]] Collected: 08/28/22 2014     Updated: 08/28/22 2014    Narrative:        Patient: NASREEN LEONE  Time Out: 20:13  Exam(s): CT ABDOMEN + PELVIS With Contrast     EXAM:    CT Abdomen and Pelvis With Intravenous Contrast    CLINICAL HISTORY:     Reason for exam: Patient with lung mass concerning for malignancy rule   out distant metastasis.    TECHNIQUE:    Axial computed tomography images of the abdomen and pelvis with   intravenous contrast.  CTDI is 13.3 mGy and DLP is 683.1 mGy-cm.  This CT   exam was performed according to the principle of ALARA (As Low As   Reasonably Achievable) by using one or more of the following dose   reduction techniques: automated exposure control, adjustment of the mA   and or kV according to patient size, and or use of iterative   reconstruction technique.    COMPARISON:    No relevant prior studies available.    FINDINGS:    Lung bases:  There is an approximately 3.4 cm spiculated mass in the   left lower lobe partially obscured by adjacent left lower lobe   atelectasis versus pneumonia.  There is a left pleural effusion measuring   3.2 cm.    Heart:  The heart is mildly enlarged.  Severe coronary calcification is    present.     ABDOMEN:    Liver:  Unremarkable.  No mass.    Gallbladder and bile ducts:  Unremarkable.  No calcified stones.  No   ductal dilation.    Pancreas:  Unremarkable.  No mass.  No ductal dilation.    Spleen:  Unremarkable.  No splenomegaly.    Adrenals:  Unremarkable.  No mass.    Kidneys and ureters:  Unremarkable.  No solid mass.  No hydronephrosis.    Stomach and bowel:  Bowel loops are nondilated.  There is   diverticulosis of the sigmoid colon without signs of acute diverticulitis.    There is wall thickening involving the right, transverse, and left   colon which is mostly contracted suggesting mild colitis.  No   pneumoperitoneum, free fluid, or abscess is identified.     PELVIS:    Appendix:  No findings to suggest acute appendicitis.    Bladder:  Unremarkable.  No mass.    Reproductive:  There is a pessary in the vagina.  Moderate   calcification of a nondilated abdominal aorta.     ABDOMEN and PELVIS:    Intraperitoneal space:  See above.    Bones joints:  Moderate degenerative changes in the lower lumbar spine.    No acute fracture or destructive bone lesion is seen.  No dislocation.    Soft tissues:  Unremarkable.    Vasculature:  Unremarkable.  No abdominal aortic aneurysm.    Lymph nodes:  Unremarkable.  No enlarged lymph nodes.    IMPRESSION:       1.  There is an approximately 3.4 cm spiculated mass in the left lower   lobe partially obscured by adjacent left lower lobe atelectasis versus   pneumonia.  There is a left pleural effusion measuring 3.2 cm.  2.  Bowel loops are nondilated.  There is diverticulosis of the sigmoid   colon without signs of acute diverticulitis.  There is wall thickening   involving the right, transverse, and left colon which is mostly   contracted suggesting mild colitis.  No pneumoperitoneum, free fluid, or   abscess is identified.      Impression:          Electronically signed by Minor Ruiz MD on 08-28-22 at 2013    NM Bone Scan Whole Body [[id number]]  Collected: 08/28/22 1942     Updated: 08/28/22 1942    Narrative:        Patient: NASREEN LEONE  Time Out: 19:41  Exam(s): NM BONE SCAN     EXAM:    NM Bone Scan    CLINICAL HISTORY:     Reason for exam: Rule out mets.    TECHNIQUE:    Anterior and posterior images of the whole body were obtained following   the intravenous administration of Tc99m MDP.    COMPARISON:    No relevant prior studies available.    FINDINGS:    No abnormal focal uptake to suggest metastasis or healing fracture.    Normal bladder and collecting system activity.    IMPRESSION:         Normal bone scan.      Impression:          Electronically signed by Luan Vargas MD on 08-28-22 at 1941    XR Chest 1 View [[id number]] Collected: 08/25/22 1750     Updated: 08/25/22 1757    Narrative:      XR CHEST 1 VW-     HISTORY: Female who is 82 years-old, post biopsy evaluation     TECHNIQUE: Frontal views of the chest     COMPARISON: 08/25/2022 at 1529 hours     FINDINGS: The heart is mildly enlarged. Aorta is tortuous. Pulmonary  vasculature is unremarkable. Old granulomatous disease is apparent.  Persistent opacity at the left base appears similar to prior exam. No  pleural effusion, or definite pneumothorax. Otherwise stable.       Impression:      No definite pneumothorax is identified.     This report was finalized on 8/25/2022 5:54 PM by Dr. Toby Lang M.D.       CT Needle Biopsy Lung [[id number]] Collected: 08/25/22 1558     Updated: 08/25/22 1608    Narrative:      CT-GUIDED BIOPSY OF LEFT LOWER LOBE MASS 08/25/2022     HISTORY: Left lower lobe mass.     After signed informed consent was obtained patient was prepped and  draped in the right posterior oblique position. Lidocaine was used for  local anesthesia.     The patient appeared somewhat confused and had difficulty following  commands including difficulty with breath-holding. An 18-gauge biopsy  device was introduced for left lung biopsy. When the needle was  introduced there  was a small amount of pulmonary hemorrhage surrounding  the nodule. Only scant biopsy material was obtained given the presence  of the hemorrhage. No pneumothorax is seen.       Impression:      1. Limited biopsy material could be obtained due to patient's inability  to breath-hold and some visualized pulmonary hemorrhage around the  nodule.  2. There were no complications.     Radiation dose reduction techniques were utilized, including automated  exposure control and exposure modulation based on body size.     This report was finalized on 8/25/2022 4:05 PM by Dr. Jerry Brasher M.D.       XR Chest 1 View [[id number]] Collected: 08/25/22 1537     Updated: 08/25/22 1547    Narrative:      XR CHEST 1 VW-  08/25/2022     HISTORY: Status post left lung biopsy.     2 images are submitted.     There is no evidence of pneumothorax. Vague nodular density is seen in  the left lung base. Patient is rotated to the left. Heart size appears  mildly enlarged.       Impression:      1. No complications are seen following left lung biopsy.     This report was finalized on 8/25/2022 3:44 PM by Dr. Jerry Brasher M.D.       CT Angiogram Chest [[id number]] Collected: 08/24/22 1950     Updated: 08/24/22 2002    Narrative:      CT ANGIOGRAM CHEST-     Radiation dose reduction techniques were utilized, including automated  exposure control and exposure modulation based on body size.     CT scan of the chest performed with intravenous contrast, pulmonary  embolus protocol to include coronal, sagittal and three-dimensional  reconstruction.     Clinical: Hypoxia, found down     FINDINGS: Mild dilatation of the ascending thoracic aorta measuring 4 cm  in diameter. The arch measures 3.6 cm in diameter and the mid descending  measures 3.3 cm in diameter. There is atherosclerotic calcification of  the aorta.     There is mild cardiac enlargement, trace pericardial  thickening/effusion. Small sliding-type hiatal hernia.     No pulmonary  embolus. Calcified benign granuloma within the right lower  lobe with associated calcified benign right hilar lymph nodes.     Located at the base of the left lower lobe there is a spiculated,  suspicious mass which measures 2.3 x 2.2 x 1.6 cm, worrisome for primary  bronchogenic carcinoma. There is a 7 mm subpleural, noncalcified left  lower lobe nodule seen on image 75. No mediastinal or hilar adenopathy.     There appears to be subpleural atelectasis at the base of the right  lower lobe along the costophrenic sulcus. There is bullae formation  compatible with emphysema.     Limited images through the upper abdomen demonstrate normal adrenal  glands. No lytic or sclerotic bone lesion.     CONCLUSION:  1. Suspicious spiculated left lower lobe lung mass worrisome for primary  bronchogenic carcinoma. Indeterminant 7 mm pleural-based left lower lobe  nodule also seen. No mediastinal or hilar adenopathy.  2. Mild dilatation of the ascending thoracic aorta, 4 cm in diameter.  3. Trace pericardial thickening/effusion.  4. Hiatal hernia.  5. Emphysema.  6. No pulmonary embolus.        This report was finalized on 8/24/2022 7:59 PM by Dr. Homero Gabriel M.D.       CT Head Without Contrast [[id number]] Collected: 08/24/22 1714     Updated: 08/24/22 1721    Narrative:      CT HEAD WO CONTRAST-, CT CERVICAL SPINE WO CONTRAST-     INDICATIONS: Trauma     TECHNIQUE: Radiation dose reduction techniques were utilized, including  automated exposure control and exposure modulation based on body size.  Noncontrast head CT, cervical spine CT     COMPARISON: None available     FINDINGS:     Head CT:     No acute intracranial hemorrhage, midline shift or mass effect. No acute  territorial infarct is identified.     Prominent periventricular hypodensities suggest chronic small vessel  ischemic change in a patient this age.     Arterial calcifications are seen at the base of the brain.     Ventricles, cisterns, cerebral sulci are  unremarkable for patient age.     Likely mucous retention cyst or polyp in right maxillary sinus. Minimal  paranasal sinus mucosal thickening. The visualized paranasal sinuses,  orbits, mastoid air cells are otherwise unremarkable.           Cervical spine CT:     No acute fracture or malalignment is identified.     Facet and uncovertebral joint hypertrophy contribute to neuroforaminal  narrowing, more prominent on the right at C5/6. Disc osteophyte complex  appears to result in mild central stenosis at C5/6.     Bilateral carotid arterial calcification is present.             Impression:         No acute intracranial hemorrhage or hydrocephalus. If there is further  clinical concern, MRI could be considered for further evaluation.     This report was finalized on 8/24/2022 5:18 PM by Dr. Toby Lang M.D.       CT Cervical Spine Without Contrast [[id number]] Collected: 08/24/22 1714     Updated: 08/24/22 1721    Narrative:      CT HEAD WO CONTRAST-, CT CERVICAL SPINE WO CONTRAST-     INDICATIONS: Trauma     TECHNIQUE: Radiation dose reduction techniques were utilized, including  automated exposure control and exposure modulation based on body size.  Noncontrast head CT, cervical spine CT     COMPARISON: None available     FINDINGS:     Head CT:     No acute intracranial hemorrhage, midline shift or mass effect. No acute  territorial infarct is identified.     Prominent periventricular hypodensities suggest chronic small vessel  ischemic change in a patient this age.     Arterial calcifications are seen at the base of the brain.     Ventricles, cisterns, cerebral sulci are unremarkable for patient age.     Likely mucous retention cyst or polyp in right maxillary sinus. Minimal  paranasal sinus mucosal thickening. The visualized paranasal sinuses,  orbits, mastoid air cells are otherwise unremarkable.           Cervical spine CT:     No acute fracture or malalignment is identified.     Facet and uncovertebral  joint hypertrophy contribute to neuroforaminal  narrowing, more prominent on the right at C5/6. Disc osteophyte complex  appears to result in mild central stenosis at C5/6.     Bilateral carotid arterial calcification is present.             Impression:         No acute intracranial hemorrhage or hydrocephalus. If there is further  clinical concern, MRI could be considered for further evaluation.     This report was finalized on 8/24/2022 5:18 PM by Dr. Toby Lang M.D.       XR Hip With or Without Pelvis 2 - 3 View Left [[id number]] Collected: 08/24/22 1628     Updated: 08/24/22 1633    Narrative:      XR HIP W OR WO PELVIS 2-3 VIEW LEFT-, XR SPINE LUMBAR COMPLETE 4+VW-     INDICATIONS: Trauma     TECHNIQUE: Frontal view the pelvis, 2 views of the left hip, 5 views of  the lumbar spine     COMPARISON: Hip x-ray from 02/23/2019     FINDINGS:     No acute fracture is identified.     Lumbar alignment is in range of normal. Multilevel endplate spurring and  lower lumbar facet arthropathy are evident. Disc space narrowing is seen  at L5/S1, mildly at L4/5. Arterial calcification is conspicuous, and the  upper abdominal aorta appears mildly aneurysmal; this appearance could  be exaggerated by technique, and dedicated aortic imaging can be  obtained for further evaluation.     Left hip arthroplasty hardware appears intact. Right hip joint space  appears preserved. The pelvic ring appears intact. No dislocation.     Follow-up/further evaluation can be obtained as indications persist.       Impression:         As described.           This report was finalized on 8/24/2022 4:30 PM by Dr. Toby Lang M.D.       XR Spine Lumbar Complete 4+VW [[id number]] Collected: 08/24/22 1628     Updated: 08/24/22 1633    Narrative:      XR HIP W OR WO PELVIS 2-3 VIEW LEFT-, XR SPINE LUMBAR COMPLETE 4+VW-     INDICATIONS: Trauma     TECHNIQUE: Frontal view the pelvis, 2 views of the left hip, 5 views of  the lumbar spine      COMPARISON: Hip x-ray from 02/23/2019     FINDINGS:     No acute fracture is identified.     Lumbar alignment is in range of normal. Multilevel endplate spurring and  lower lumbar facet arthropathy are evident. Disc space narrowing is seen  at L5/S1, mildly at L4/5. Arterial calcification is conspicuous, and the  upper abdominal aorta appears mildly aneurysmal; this appearance could  be exaggerated by technique, and dedicated aortic imaging can be  obtained for further evaluation.     Left hip arthroplasty hardware appears intact. Right hip joint space  appears preserved. The pelvic ring appears intact. No dislocation.     Follow-up/further evaluation can be obtained as indications persist.       Impression:         As described.           This report was finalized on 8/24/2022 4:30 PM by Dr. Toby Lang M.D.       XR Chest 1 View [[id number]] Collected: 08/24/22 1625     Updated: 08/24/22 1629    Narrative:      XR CHEST 1 VW-     HISTORY: Female who is 82 years-old,  chest pain     TECHNIQUE: Frontal view of the chest     COMPARISON: 02/22/2019     FINDINGS: The heart is enlarged. Aorta is tortuous, calcified. Pulmonary  vasculature is unremarkable. Within granulomatous disease is seen. No  focal pulmonary consolidation, pleural effusion, or pneumothorax. No  acute osseous process.       Impression:      No evidence for acute pulmonary process. Cardiomegaly.  Tortuous aorta. Follow-up as clinically indicated.     This report was finalized on 8/24/2022 4:26 PM by Dr. Toby Lang M.D.               Pertinent Labs     Results from last 7 days   Lab Units 09/03/22  0457 08/28/22  0553   WBC 10*3/mm3 8.03 4.73   HEMOGLOBIN g/dL 11.1* 10.0*   PLATELETS 10*3/mm3 368 155     Results from last 7 days   Lab Units 09/03/22  0457 09/02/22  0530 09/01/22  0600 08/31/22  0533   SODIUM mmol/L 136 139 135* 140   POTASSIUM mmol/L 4.0 4.3 4.2 4.1   CHLORIDE mmol/L 98 98 98 101   CO2 mmol/L 29.0 30.8* 30.9* 34.5*    BUN mg/dL 12 11 11 11   CREATININE mg/dL 0.60 0.58 0.48* 0.56*   GLUCOSE mg/dL 102* 86 90 85   EGFR mL/min/1.73 90.3 91.0 95.3 91.8     Results from last 7 days   Lab Units 08/28/22  0553   ALBUMIN g/dL 3.30*   BILIRUBIN mg/dL 0.4   ALK PHOS U/L 38*   AST (SGOT) U/L 170*   ALT (SGPT) U/L 57*     Results from last 7 days   Lab Units 09/03/22  0457 09/02/22  0530 09/01/22  0600 08/31/22  0533 08/29/22  1546 08/28/22  0553   CALCIUM mg/dL 9.1 9.2 8.7 8.8   < > 8.1*   ALBUMIN g/dL  --   --   --   --   --  3.30*    < > = values in this interval not displayed.       Results from last 7 days   Lab Units 08/30/22  0650 08/29/22  1546 08/28/22  0553   CK TOTAL U/L 1,878* 3,151* 5,181*           Invalid input(s): LDLCALC  Results from last 7 days   Lab Units 08/31/22  0912   BODYFLDCX  No growth at 3 days         Test Results Pending at Discharge     Pending Labs     Order Current Status    Fungus Culture - Body Fluid, Pleural Cavity In process    AFB Culture - Body Fluid, Pleural Cavity Preliminary result    Body Fluid Culture - Body Fluid, Pleural Cavity Preliminary result    Tissue / Bone Culture - Tissue, Lung, L Preliminary result          Discharge Details        Discharge Medications      New Medications      Instructions Start Date   atorvastatin 20 MG tablet  Commonly known as: LIPITOR  Replaces: simvastatin 40 MG tablet   20 mg, Oral, Daily   Start Date: September 4, 2022     budesonide-formoterol 160-4.5 MCG/ACT inhaler  Commonly known as: SYMBICORT   2 puffs, Inhalation, 2 Times Daily - RT      hydrocortisone-bacitracin-zinc oxide-nystatin  Commonly known as: MAGIC BARRIER   1 application, Topical, 2 Times Daily      nicotine 14 MG/24HR patch  Commonly known as: NICODERM CQ   1 patch, Transdermal, Every 24 Hours      nicotine polacrilex 2 MG gum  Commonly known as: NICORETTE   2 mg, Mouth/Throat, Every 1 Hour PRN         Changes to Medications      Instructions Start Date   levothyroxine 125 MCG tablet  Commonly  known as: SYNTHROID, LEVOTHROID  What changed:   · medication strength  · how much to take  · when to take this   125 mcg, Oral, Every Early Morning   Start Date: September 4, 2022        Continue These Medications      Instructions Start Date   acetaminophen 325 MG tablet  Commonly known as: TYLENOL   650 mg, Oral, Every 4 Hours PRN      bisacodyl 5 MG EC tablet  Commonly known as: DULCOLAX   10 mg, Oral, Daily PRN      calcium carb-cholecalciferol 600-800 MG-UNIT tablet   2 tablets, Oral, Daily      donepezil 5 MG tablet  Commonly known as: ARICEPT   5 mg, Oral, Daily      estradiol 2 MG vaginal ring  Commonly known as: ESTRING   2 mg, Vaginal, Every 3 Months, follow package directions      fish oil 1000 MG capsule capsule   1,000 mg, Oral, 2 Times Daily      letrozole 2.5 MG tablet  Commonly known as: FEMARA   2.5 mg, Oral, Daily      Oxyquinoline-Sod Lauryl Sulf 0.025-0.01 % gel   Vaginal, 3 Times Weekly      PreserVision AREDS capsule   1 tablet, Oral, 2 Times Daily      silver sulfadiazine 1 % cream  Commonly known as: SILVADENE, SSD   1 application, Topical, As Needed      triamcinolone 0.1 % cream  Commonly known as: KENALOG   1 application, Topical, As Needed      Vitamin D (Cholecalciferol) 10 MCG (400 UNIT) tablet  Commonly known as: CHOLECALCIFEROL   400 Units, Oral, Daily         Stop These Medications    simvastatin 40 MG tablet  Commonly known as: ZOCOR  Replaced by: atorvastatin 20 MG tablet            Allergies   Allergen Reactions   • Latex, Natural Rubber Rash       Discharge Disposition:  Skilled Nursing Facility (DC - External)      Discharge Diet:  Diet Order   Procedures   • Diet Regular       Discharge Activity:       CODE STATUS:    Code Status and Medical Interventions:   Ordered at: 08/24/22 0458     Code Status (Patient has no pulse and is not breathing):    CPR (Attempt to Resuscitate)     Medical Interventions (Patient has pulse or is breathing):    Full Support       No future  appointments.   Contact information for follow-up providers     Duncan Garcia MD .    Specialty: General Practice  Contact information:  [address] [facility]  [address]  [place] [facility] [address]  [phone]             Sandra Cespedes MD Follow up in 2 week(s).    Specialties: Hematology and Oncology, Internal Medicine, Oncology  Contact information:  [address] [facility]  [place] [facility] [id number] [phone]             [facility] RADIATION ONCOLOGY Follow up in 3 day(s).    Specialty: Radiation Oncology  Contact information:  [address] [facility] [address]  [place] [id number] [phone]  Additional information:  Phone Number: [phone]                 Contact information for after-discharge care     Destination     [facility] .    Service: Skilled Nursing  Contact information:  [address] [facility] [address]  [phone]                             Time Spent on Discharge:  Greater than 30 minutes      Jim Borrego MD  [facility]  09/03/22  14:04 EDT

## 2022-09-03 NOTE — PLAN OF CARE
Goal Outcome Evaluation:  Plan of Care Reviewed With: patient        Progress: improving  Outcome Evaluation: Went over the plan of care and and answered all questions.Vitals stable and pain is well controlled. All medications given without complications and patient tolerating her diet. No other issues this shift.

## 2022-09-03 NOTE — PROGRESS NOTES
Subjective .     REASONS FOR FOLLOWUP:      1. Newly left lower lobe lung mass, biopsy done August 25, 2022    2.  Breast cancer history: Followed by Earlene Dey  1. 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 1/11/2018: Neoadjuvant letrozole prescribed, started in 2/2018   - switched to anastrozole in 1/2022   2. 2/28/2018: Right lumpectomy (Dr. Stevens)  3. 2/28/2018: Left lumpectomy ()  3. 1/11/2017: Radiation evaluation - ok to forego radiation Dr. Rocha)    Screening:  Mammogram poonam screening- 12/23/2021- no suspicious findings   DEXA: 6/21/2021 - osteopenia multiple sites T-score -2.3 right femoral neck, T-score -2.1 total right hip,T-score -1.7 lumbar spine   12/16/19- -2.7 Osteoporosis - declines bone modifying agents  Colonoscopy: never, refuses (had colon guard through her pcp)       Interval history:    August 28, 2022:  Patient with spiculated left lower lobe lung mass worrisome for carcinoma.  Difficult to reach bronchoscopically.  Pulmonary is wondering if navigational bronchoscopy could be considered.  Certainly repeat CT-guided biopsy since the first biopsy was negative could be considered  Dr. Gonzalez discussed in length with patient's son regarding getting PET scan as outpatient and if highly active then to questionably consider just radiation versus repeat biopsy and patient's son wanted repeat biopsy.  May be worthwhile considering thoracic surgery consult.  CT abdomen pelvis pending and bone scan pending.  MRI brain atrophy.    Doubt if there is any distant metastasis given a small  lesion 2.  2 x 2.3 x 1.6 cm.    8/29/2022  MRI brain was obtained which did not show any acute intracranial abnormality.  There was moderate to severe small vessel disease and mild diffuse cerebral atrophy.  There was extensive edema and swelling of the scalp overlying the posterior superior lateral aspect of the parietal bone where she likely has a scalp hematoma    9/1/2022  Pleural fluid cytology still pending.  Repeat CT-guided biopsy planned today.  She denies any new complaints.  Remains confused.    9/2/2022  Patient remains confused.  She is appearing comfortable in bed.  She underwent a CT-guided biopsy of the left lung mass on 9/1/2022  Results pending  Pleural fluid cytology negative for malignant cells.  Predominantly blood with reactive mesothelial macrophages and mixed inflammation.    9/3/2022   Pathology of the CT-guided lung biopsy performed on 9/1/2022 resulted and consistent with invasive moderately differentiated adenocarcinoma.  She was evaluated yesterday again by cardiothoracic surgery and they have determined that she is not a surgical candidate.  Vital signs remained stable and she is currently on 2 L of oxygen by nasal cannula.    She is pleasant and alert but confused.  Denies any new complaints at this time    HISTORY OF PRESENT ILLNESS:       patient is a 82-year-old female who was admitted on August 24, 2022 with multiple medical problems hypertension hyperlipidemia diabetes, left femoral neck fracture s/p total left hip arthroplasty who came to the ER as she was found down.  Her daughter-in-law and son at the bedside report that they spoke to her yesterday at 4 PM when their brother attempted to call patient she did not answer on the phone at 8 PM and when they went to check on her she was found on the floor.  When she came to the ER she was oriented to her baseline.  Patient family states that she fell about 2 weeks ago and did not want to be evaluated at that time.  CT head head was  done in the emergency room which showed no acute intracranial hemorrhage or hydrocephalus.  Chest x-ray showed no evidence of pneumonia there was cardiomegaly.  CT angiogram of the chest was done there was mild dilation of the ascending thoracic aorta measuring 4 cm.  The arch measured 3.6 cm.  In the descending was 3.3 cm.  There is mild cardiac enlargement.  Located in the base of the left lower lobe is a spiculated suspicious mass 2.2 x 2.3 x 1.6 cm worrisome for primary bronchogenic carcinoma.  There is a 7 mm subpleural noncalcified left lower lobe lung nodule.  There is no mediastinal or hilar adenopathy.  There appears to be subpleural atelectasis at the base of the right lower lobe.  There is no lytic or bone lesion seen.  So there is suspicious spiculated left lower lobe lung mass worrisome for probable primary bronchogenic carcinoma.  There is a 7 mm pleural-based left lower lobe nodule also.  X-ray of the hip was done which showed left hip arthroplasty hardware was intact.  Right hip joint space is preserved.     Her hemoglobin on admission was 11.5 white count of 7.45 platelet of 166.  She had 62% neutrophils and 22% monocytes.  CPK was elevated at 4453.  TSH was 11.5.  Creatinine was 0.65 and liver function tests were mildly elevated with ALT of 36 and AST of 110 with a total bilirubin of 0.4         Past Medical History:   Diagnosis Date   • Breast cancer (HCC)    • Cancer (HCC)    • Cataracts, bilateral    • Hyperlipidemia    • Hypertension    • Hypothyroidism    • Uterine prolapse        ONCOLOGIC HISTORY:  (History from previous dates can be found in the separate document.)    No current facility-administered medications on file prior to encounter.     Current Outpatient Medications on File Prior to Encounter   Medication Sig Dispense Refill   • acetaminophen (TYLENOL) 325 MG tablet Take 2 tablets by mouth Every 4 (Four) Hours As Needed for Mild Pain .     • calcium carb-cholecalciferol 600-800  MG-UNIT tablet Take 2 tablets by mouth Daily.     • donepezil (ARICEPT) 5 MG tablet Take 5 mg by mouth Daily.     • letrozole (FEMARA) 2.5 MG tablet Take 2.5 mg by mouth Daily.     • levothyroxine (SYNTHROID, LEVOTHROID) 112 MCG tablet Take 112 mcg by mouth Daily.     • Multiple Vitamins-Minerals (PRESERVISION AREDS) capsule Take 1 tablet by mouth 2 (two) times a day.       • Omega-3 Fatty Acids (FISH OIL) 1000 MG capsule capsule Take 1,000 mg by mouth 2 (Two) Times a Day.     • simvastatin (ZOCOR) 40 MG tablet Take 40 mg by mouth Daily.     • Vitamin D, Cholecalciferol, (CHOLECALCIFEROL) 400 units tablet Take 400 Units by mouth Daily.     • bisacodyl (DULCOLAX) 5 MG EC tablet Take 2 tablets by mouth Daily As Needed for Constipation.     • estradiol (ESTRING) 2 MG vaginal ring Insert 2 mg into the vagina Every 3 (Three) Months. follow package directions     • Oxyquinoline-Sod Lauryl Sulf 0.025-0.01 % gel Insert  into the vagina 3 (Three) Times a Week.     • silver sulfadiazine (SILVADENE, SSD) 1 % cream Apply 1 application topically to the appropriate area as directed As Needed for Wound Care.     • triamcinolone (KENALOG) 0.1 % cream Apply 1 application topically to the appropriate area as directed As Needed.         ALLERGIES:     Allergies   Allergen Reactions   • Latex, Natural Rubber Rash       Social History     Socioeconomic History   • Marital status: Unknown   Tobacco Use   • Smoking status: Current Every Day Smoker     Packs/day: 0.50     Years: 20.00     Pack years: 10.00     Types: Cigarettes   • Smokeless tobacco: Never Used   Vaping Use   • Vaping Use: Never used   Substance and Sexual Activity   • Alcohol use: Yes     Comment: SOCIALLY    • Drug use: No   • Sexual activity: Defer         Cancer-related family history includes Breast cancer in her mother; Lung cancer in her father.     Review of Systems  A comprehensive 14 point review of systems was performed and was negative except as  mentioned.    Objective      Vitals:    09/02/22 1717 09/02/22 2003 09/02/22 2117 09/03/22 0524   BP: 132/70  128/74 117/70   BP Location: Left arm  Left arm Left arm   Patient Position: Lying  Lying Lying   Pulse: 77 78 72 76   Resp: 18 18 18 16   Temp: 97.1 °F (36.2 °C)  97.1 °F (36.2 °C) 96.7 °F (35.9 °C)   TempSrc: Oral   Oral   SpO2:  92% 94% 96%   Weight:       Height:         No flowsheet data found.    Physical Exam  Vitals reviewed.   Constitutional:       Appearance: Normal appearance.   HENT:      Head: Normocephalic.      Nose: Nose normal.   Cardiovascular:      Rate and Rhythm: Normal rate and regular rhythm.      Pulses: Normal pulses.   Pulmonary:      Effort: Pulmonary effort is normal.      Breath sounds: Normal breath sounds.   Skin:     General: Skin is warm.   Neurological:      General: No focal deficit present.         I have reexamined the patient and the results are consistent with the previously documented exam. Sandra Cespedes MD      RECENT LABS:  Hematology WBC   Date Value Ref Range Status   09/03/2022 8.03 3.40 - 10.80 10*3/mm3 Final     RBC   Date Value Ref Range Status   09/03/2022 3.60 (L) 3.77 - 5.28 10*6/mm3 Final     Hemoglobin   Date Value Ref Range Status   09/03/2022 11.1 (L) 12.0 - 15.9 g/dL Final     Hematocrit   Date Value Ref Range Status   09/03/2022 33.2 (L) 34.0 - 46.6 % Final     Platelets   Date Value Ref Range Status   09/03/2022 368 140 - 450 10*3/mm3 Final        Assessment & Plan   *Rhabdomyolysis with elevated CPK  · Patient was seen on the floor by the family, unsure how long  · Per the family patient had fall 2 weeks ago.  · In the ER she was found to have elevated CPK concerning for rhabdomyolysis but the creatinine was normal  · She was started on IV fluids  · She was tested for COVID-19 which is negative.  · CT head was negative, MRI brain negative, chest x-ray negative but CT scan angiogram of the chest did not show pulmonary embolism but showed a left  lower lobe lung nodule 2.3 x 2.2 x 1.6 cm.  · Improving  · Creatinine normal     *New lung nodule in the left lower lobe 2.3 x 2.2 x 1.6 cm s/p biopsy today.  Path pending  · CT biopsy of the lung nodule negative  · However patient is getting repeat biopsy as they concerned that it is malignancy.  · MRI brain showed no intracranial abnormality.  There is moderate mild diffuse cerebral atrophy.  · Pulmonary saw patient and discussed different options with patient's son but patient's son does want repeat biopsy.  · Bone scan 8/28/2022 negative  · CT of the abdomen and pelvis shows an approximately 3.4 cm spiculated mass of the left lower lobe.  Left pleural effusion measuring 3.2 cm.  No evidence of metastatic disease in the abdomen  ·  Pulmonary on board.  Left-sided thoracentesis performed 8/31/2022  · Pleural fluid cytology benign  · CT-guided biopsy of the lung performed 9/1/2022-pathology consistent with moderately differentiated invasive adenocarcinoma  · We will consult radiation oncology to see if she would be a candidate for radiation  · We would need to obtain NexGen ration sequencing to see if any oral agents could be used in the treatment of this malignancy  · There is no evidence of distant metastatic disease however the CT of the chest does show a left-sided pleural effusion which was tapped and noted to be negative for malignancy but cannot entirely rule out a malignant pleural effusion.  There is also an additional 7 mm nodule which is pleural-based which could also be malignancy.  Given these findings I am not entirely sure if radiation would be an option.  However we will get radiation oncology to see her to determine the same.  · She will need an outpatient PET/CT  · Cardiothoracic surgery determined that she is not a surgical candidate.  · I had a lengthy discussion with her son Jerry Cardenas this morning.  Explained that it is difficult to determine the stage due to the presence of the additional  pulmonary nodule as well as the left-sided pleural effusion but we would get radiation oncology to weigh in on the option of radiation.  · I will go ahead and send a Caris test and arrange for outpatient PET/CT.  · If radiation is not an option and if family still wants to pursue treatment, I did advise against chemotherapy as I do not think she will be able to tolerate that.  We could consider single agent Keytruda if PD-L1 positive or TKI in the setting of EGFR or ALK or other targetable mutations  · Mr. Cardenas verbalized understanding.       *Dementia   B12, folic acid normal  Neurology consulted  TSH elevated  Mental status unchanged     *Leukocytosis   Resolved     *DVT prophylaxis on Lovenox    Plan    · Pleural fluid cytology negative  · Seen by cardiothoracic surgery and patient thought to be not a surgical candidate  · CT of the abdomen and pelvis without any evidence of metastatic  · Bone scan negative  · Consult radiation oncology on Tuesday.  · We will send NexGen ration sequencing  · Arrange for outpatient PET  · Goals of care discussion done with son today.  He expressed today that the quality of life is more important at this time.  They do not want to pursue IV chemotherapy.  They may consider oral agents if there is an actionable mutation.  He would like to discuss with radiation oncology if that is an option    We will arrange for Caris testing to be performed as an outpatient and PET/CT to be performed as an outpatient.  I will consult radiation oncology for them to see on 9/6/2022.   We will plan to see the patient as an outpatient if patient and family wish to pursue treatment    Sandra Cespedes MD                Cc:  Duncan Brian MD

## 2022-09-03 NOTE — NURSING NOTE
Called the facility to give report and was put on hold for 25 mins and was told they dont know who the nurse is and would call me back.

## 2022-09-04 LAB
BACTERIA SPEC AEROBE CULT: NORMAL
GRAM STN SPEC: NORMAL

## 2022-09-05 LAB
BACTERIA FLD CULT: NORMAL
GRAM STN SPEC: NORMAL

## 2022-09-06 DIAGNOSIS — C34.92 ADENOCARCINOMA, LUNG, LEFT: Primary | ICD-10-CM

## 2022-09-06 NOTE — PROGRESS NOTES
Case Management Discharge Note      Final Note: Discharged to [facility] via [facility] Vika Santizo, KLAUS    Provided Post Acute Provider List?: N/A  N/A Provider List Comment: Patient requested referral to [facility]    Selected Continued Care - Discharged on 9/3/2022 Admission date: 8/24/2022 - Discharge disposition: Skilled Nursing Facility (DC - External)    Destination Coordination complete.    Service Provider Selected Services Address Phone Fax Patient Preferred    [facility]  Skilled Nursing [address] [facility] [address] [phone] --       Internal Comment last updated by Gege Perea, RN 8/31/2022 1041    .8/31/2022 cert approved .Gege Perea, RN                           Transportation Services  W[facility]C [facility]    Final Discharge Disposition Code: 03 - skilled nursing facility (SNF)

## 2022-09-13 ENCOUNTER — APPOINTMENT (OUTPATIENT)
Dept: LAB | Facility: HOSPITAL | Age: 82
End: 2022-09-13

## 2022-09-15 ENCOUNTER — TELEPHONE (OUTPATIENT)
Dept: ONCOLOGY | Facility: CLINIC | Age: 82
End: 2022-09-15

## 2022-09-15 NOTE — TELEPHONE ENCOUNTER
Caller: DRAKE    [facility]    Best call back number: [phone]    Type of visit: LAB AND FOLLOW UP    Requested date: ANY ASAP     If rescheduling, when is the original appointment: 09/13     Additional notes: PLEASE CALL ONCE R/S. HUB UNABLE TO R/S WITHIN TIMEFRAME

## 2022-09-25 ENCOUNTER — APPOINTMENT (OUTPATIENT)
Dept: CT IMAGING | Facility: HOSPITAL | Age: 82
End: 2022-09-25

## 2022-09-25 ENCOUNTER — HOSPITAL ENCOUNTER (INPATIENT)
Facility: HOSPITAL | Age: 82
LOS: 9 days | Discharge: NURSING, REHAB OR LONG-TERM CARE | End: 2022-10-04
Attending: EMERGENCY MEDICINE | Admitting: INTERNAL MEDICINE

## 2022-09-25 ENCOUNTER — APPOINTMENT (OUTPATIENT)
Dept: GENERAL RADIOLOGY | Facility: HOSPITAL | Age: 82
End: 2022-09-25

## 2022-09-25 DIAGNOSIS — R09.02 HYPOXIA: ICD-10-CM

## 2022-09-25 DIAGNOSIS — C34.92 NSCLC OF LEFT LUNG: ICD-10-CM

## 2022-09-25 DIAGNOSIS — R53.1 WEAKNESS: ICD-10-CM

## 2022-09-25 DIAGNOSIS — J18.9 COMMUNITY ACQUIRED PNEUMONIA OF RIGHT UPPER LOBE OF LUNG: Primary | ICD-10-CM

## 2022-09-25 PROBLEM — N39.0 UTI (URINARY TRACT INFECTION): Status: ACTIVE | Noted: 2022-09-25

## 2022-09-25 PROBLEM — A41.9 SEPSIS (HCC): Status: ACTIVE | Noted: 2022-09-25

## 2022-09-25 PROBLEM — U07.1 COVID-19 VIRUS INFECTION: Status: ACTIVE | Noted: 2022-09-25

## 2022-09-25 LAB
ALBUMIN SERPL-MCNC: 3.8 G/DL (ref 3.5–5.2)
ALBUMIN/GLOB SERPL: 1.2 G/DL
ALP SERPL-CCNC: 91 U/L (ref 39–117)
ALT SERPL W P-5'-P-CCNC: 21 U/L (ref 1–33)
ANION GAP SERPL CALCULATED.3IONS-SCNC: 10.6 MMOL/L (ref 5–15)
ANISOCYTOSIS BLD QL: ABNORMAL
AST SERPL-CCNC: 33 U/L (ref 1–32)
B PARAPERT DNA SPEC QL NAA+PROBE: NOT DETECTED
B PERT DNA SPEC QL NAA+PROBE: NOT DETECTED
BACTERIA UR QL AUTO: ABNORMAL /HPF
BILIRUB SERPL-MCNC: 0.3 MG/DL (ref 0–1.2)
BILIRUB UR QL STRIP: NEGATIVE
BUN SERPL-MCNC: 12 MG/DL (ref 8–23)
BUN/CREAT SERPL: 17.6 (ref 7–25)
C PNEUM DNA NPH QL NAA+NON-PROBE: NOT DETECTED
CALCIUM SPEC-SCNC: 9.5 MG/DL (ref 8.6–10.5)
CHLORIDE SERPL-SCNC: 96 MMOL/L (ref 98–107)
CLARITY UR: ABNORMAL
CO2 SERPL-SCNC: 27.4 MMOL/L (ref 22–29)
COLOR UR: YELLOW
CREAT SERPL-MCNC: 0.68 MG/DL (ref 0.57–1)
D DIMER PPP FEU-MCNC: 1.44 MCGFEU/ML (ref 0–0.49)
D-LACTATE SERPL-SCNC: 1 MMOL/L (ref 0.5–2)
DEPRECATED RDW RBC AUTO: 49.6 FL (ref 37–54)
EGFRCR SERPLBLD CKD-EPI 2021: 87.6 ML/MIN/1.73
ERYTHROCYTE [DISTWIDTH] IN BLOOD BY AUTOMATED COUNT: 14.4 % (ref 12.3–15.4)
FLUAV SUBTYP SPEC NAA+PROBE: NOT DETECTED
FLUBV RNA ISLT QL NAA+PROBE: NOT DETECTED
GLOBULIN UR ELPH-MCNC: 3.2 GM/DL
GLUCOSE SERPL-MCNC: 101 MG/DL (ref 65–99)
GLUCOSE UR STRIP-MCNC: NEGATIVE MG/DL
HADV DNA SPEC NAA+PROBE: NOT DETECTED
HCOV 229E RNA SPEC QL NAA+PROBE: NOT DETECTED
HCOV HKU1 RNA SPEC QL NAA+PROBE: NOT DETECTED
HCOV NL63 RNA SPEC QL NAA+PROBE: NOT DETECTED
HCOV OC43 RNA SPEC QL NAA+PROBE: NOT DETECTED
HCT VFR BLD AUTO: 32 % (ref 34–46.6)
HGB BLD-MCNC: 10.4 G/DL (ref 12–15.9)
HGB UR QL STRIP.AUTO: NEGATIVE
HMPV RNA NPH QL NAA+NON-PROBE: NOT DETECTED
HPIV1 RNA ISLT QL NAA+PROBE: NOT DETECTED
HPIV2 RNA SPEC QL NAA+PROBE: NOT DETECTED
HPIV3 RNA NPH QL NAA+PROBE: NOT DETECTED
HPIV4 P GENE NPH QL NAA+PROBE: NOT DETECTED
HYALINE CASTS UR QL AUTO: ABNORMAL /LPF
KETONES UR QL STRIP: ABNORMAL
LEUKOCYTE ESTERASE UR QL STRIP.AUTO: ABNORMAL
LYMPHOCYTES # BLD MANUAL: 1.24 10*3/MM3 (ref 0.7–3.1)
LYMPHOCYTES NFR BLD MANUAL: 21 % (ref 5–12)
M PNEUMO IGG SER IA-ACNC: NOT DETECTED
MCH RBC QN AUTO: 30.8 PG (ref 26.6–33)
MCHC RBC AUTO-ENTMCNC: 32.5 G/DL (ref 31.5–35.7)
MCV RBC AUTO: 94.7 FL (ref 79–97)
MONOCYTES # BLD: 3.26 10*3/MM3 (ref 0.1–0.9)
NEUTROPHILS # BLD AUTO: 11.03 10*3/MM3 (ref 1.7–7)
NEUTROPHILS NFR BLD MANUAL: 71 % (ref 42.7–76)
NITRITE UR QL STRIP: POSITIVE
NRBC BLD AUTO-RTO: 0 /100 WBC (ref 0–0.2)
NT-PROBNP SERPL-MCNC: 721 PG/ML (ref 0–1800)
PH UR STRIP.AUTO: 6 [PH] (ref 5–8)
PLAT MORPH BLD: NORMAL
PLATELET # BLD AUTO: 300 10*3/MM3 (ref 140–450)
PMV BLD AUTO: 10.1 FL (ref 6–12)
POTASSIUM SERPL-SCNC: 4.2 MMOL/L (ref 3.5–5.2)
PROCALCITONIN SERPL-MCNC: 0.14 NG/ML (ref 0–0.25)
PROT SERPL-MCNC: 7 G/DL (ref 6–8.5)
PROT UR QL STRIP: ABNORMAL
QT INTERVAL: 358 MS
RBC # BLD AUTO: 3.38 10*6/MM3 (ref 3.77–5.28)
RBC # UR STRIP: ABNORMAL /HPF
REF LAB TEST METHOD: ABNORMAL
RHINOVIRUS RNA SPEC NAA+PROBE: NOT DETECTED
RSV RNA NPH QL NAA+NON-PROBE: NOT DETECTED
SARS-COV-2 RNA NPH QL NAA+NON-PROBE: DETECTED
SODIUM SERPL-SCNC: 134 MMOL/L (ref 136–145)
SP GR UR STRIP: 1.02 (ref 1–1.03)
SQUAMOUS #/AREA URNS HPF: ABNORMAL /HPF
TROPONIN T SERPL-MCNC: <0.01 NG/ML (ref 0–0.03)
UROBILINOGEN UR QL STRIP: ABNORMAL
VARIANT LYMPHS NFR BLD MANUAL: 8 % (ref 19.6–45.3)
WBC # UR STRIP: ABNORMAL /HPF
WBC MORPH BLD: NORMAL
WBC NRBC COR # BLD: 15.53 10*3/MM3 (ref 3.4–10.8)

## 2022-09-25 PROCEDURE — 84484 ASSAY OF TROPONIN QUANT: CPT | Performed by: PHYSICIAN ASSISTANT

## 2022-09-25 PROCEDURE — 83880 ASSAY OF NATRIURETIC PEPTIDE: CPT | Performed by: PHYSICIAN ASSISTANT

## 2022-09-25 PROCEDURE — 87186 SC STD MICRODIL/AGAR DIL: CPT | Performed by: PHYSICIAN ASSISTANT

## 2022-09-25 PROCEDURE — 71045 X-RAY EXAM CHEST 1 VIEW: CPT

## 2022-09-25 PROCEDURE — 84145 PROCALCITONIN (PCT): CPT | Performed by: PHYSICIAN ASSISTANT

## 2022-09-25 PROCEDURE — 81001 URINALYSIS AUTO W/SCOPE: CPT | Performed by: PHYSICIAN ASSISTANT

## 2022-09-25 PROCEDURE — 0202U NFCT DS 22 TRGT SARS-COV-2: CPT | Performed by: EMERGENCY MEDICINE

## 2022-09-25 PROCEDURE — 99285 EMERGENCY DEPT VISIT HI MDM: CPT

## 2022-09-25 PROCEDURE — 94761 N-INVAS EAR/PLS OXIMETRY MLT: CPT

## 2022-09-25 PROCEDURE — 87040 BLOOD CULTURE FOR BACTERIA: CPT | Performed by: PHYSICIAN ASSISTANT

## 2022-09-25 PROCEDURE — 25010000002 AZITHROMYCIN PER 500 MG: Performed by: PHYSICIAN ASSISTANT

## 2022-09-25 PROCEDURE — 0 IOPAMIDOL PER 1 ML: Performed by: EMERGENCY MEDICINE

## 2022-09-25 PROCEDURE — 25010000002 CEFTRIAXONE PER 250 MG: Performed by: PHYSICIAN ASSISTANT

## 2022-09-25 PROCEDURE — 94640 AIRWAY INHALATION TREATMENT: CPT

## 2022-09-25 PROCEDURE — 71275 CT ANGIOGRAPHY CHEST: CPT

## 2022-09-25 PROCEDURE — 94799 UNLISTED PULMONARY SVC/PX: CPT

## 2022-09-25 PROCEDURE — 85379 FIBRIN DEGRADATION QUANT: CPT | Performed by: PHYSICIAN ASSISTANT

## 2022-09-25 PROCEDURE — 85025 COMPLETE CBC W/AUTO DIFF WBC: CPT | Performed by: PHYSICIAN ASSISTANT

## 2022-09-25 PROCEDURE — 80053 COMPREHEN METABOLIC PANEL: CPT | Performed by: PHYSICIAN ASSISTANT

## 2022-09-25 PROCEDURE — 87086 URINE CULTURE/COLONY COUNT: CPT | Performed by: PHYSICIAN ASSISTANT

## 2022-09-25 PROCEDURE — 85007 BL SMEAR W/DIFF WBC COUNT: CPT | Performed by: PHYSICIAN ASSISTANT

## 2022-09-25 PROCEDURE — 83605 ASSAY OF LACTIC ACID: CPT | Performed by: PHYSICIAN ASSISTANT

## 2022-09-25 PROCEDURE — 93010 ELECTROCARDIOGRAM REPORT: CPT | Performed by: INTERNAL MEDICINE

## 2022-09-25 PROCEDURE — 87088 URINE BACTERIA CULTURE: CPT | Performed by: PHYSICIAN ASSISTANT

## 2022-09-25 PROCEDURE — 93005 ELECTROCARDIOGRAM TRACING: CPT | Performed by: PHYSICIAN ASSISTANT

## 2022-09-25 RX ORDER — SODIUM CHLORIDE 0.9 % (FLUSH) 0.9 %
10 SYRINGE (ML) INJECTION AS NEEDED
Status: DISCONTINUED | OUTPATIENT
Start: 2022-09-25 | End: 2022-10-04 | Stop reason: HOSPADM

## 2022-09-25 RX ORDER — ACETAMINOPHEN 325 MG/1
650 TABLET ORAL EVERY 4 HOURS PRN
Status: DISCONTINUED | OUTPATIENT
Start: 2022-09-25 | End: 2022-10-04 | Stop reason: HOSPADM

## 2022-09-25 RX ORDER — BUDESONIDE AND FORMOTEROL FUMARATE DIHYDRATE 160; 4.5 UG/1; UG/1
2 AEROSOL RESPIRATORY (INHALATION)
Status: DISCONTINUED | OUTPATIENT
Start: 2022-09-25 | End: 2022-10-04 | Stop reason: HOSPADM

## 2022-09-25 RX ORDER — AZITHROMYCIN 250 MG/1
500 TABLET, FILM COATED ORAL
Status: DISCONTINUED | OUTPATIENT
Start: 2022-09-26 | End: 2022-09-26

## 2022-09-25 RX ORDER — LEVOTHYROXINE SODIUM 0.12 MG/1
125 TABLET ORAL
Status: DISCONTINUED | OUTPATIENT
Start: 2022-09-26 | End: 2022-10-04 | Stop reason: HOSPADM

## 2022-09-25 RX ORDER — IPRATROPIUM BROMIDE AND ALBUTEROL SULFATE 2.5; .5 MG/3ML; MG/3ML
3 SOLUTION RESPIRATORY (INHALATION) ONCE
Status: COMPLETED | OUTPATIENT
Start: 2022-09-25 | End: 2022-09-25

## 2022-09-25 RX ORDER — SODIUM CHLORIDE 0.9 % (FLUSH) 0.9 %
10 SYRINGE (ML) INJECTION EVERY 12 HOURS SCHEDULED
Status: DISCONTINUED | OUTPATIENT
Start: 2022-09-25 | End: 2022-10-04 | Stop reason: HOSPADM

## 2022-09-25 RX ORDER — LETROZOLE 2.5 MG/1
2.5 TABLET, FILM COATED ORAL DAILY
Status: DISCONTINUED | OUTPATIENT
Start: 2022-09-26 | End: 2022-10-04 | Stop reason: HOSPADM

## 2022-09-25 RX ORDER — DONEPEZIL HYDROCHLORIDE 5 MG/1
5 TABLET, FILM COATED ORAL DAILY
Status: DISCONTINUED | OUTPATIENT
Start: 2022-09-26 | End: 2022-10-04 | Stop reason: HOSPADM

## 2022-09-25 RX ORDER — ONDANSETRON 2 MG/ML
4 INJECTION INTRAMUSCULAR; INTRAVENOUS EVERY 6 HOURS PRN
Status: DISCONTINUED | OUTPATIENT
Start: 2022-09-25 | End: 2022-10-04 | Stop reason: HOSPADM

## 2022-09-25 RX ORDER — SODIUM CHLORIDE 9 MG/ML
75 INJECTION, SOLUTION INTRAVENOUS CONTINUOUS
Status: DISCONTINUED | OUTPATIENT
Start: 2022-09-25 | End: 2022-09-28

## 2022-09-25 RX ORDER — ATORVASTATIN CALCIUM 20 MG/1
20 TABLET, FILM COATED ORAL DAILY
Status: DISCONTINUED | OUTPATIENT
Start: 2022-09-26 | End: 2022-10-04 | Stop reason: HOSPADM

## 2022-09-25 RX ORDER — BISACODYL 5 MG/1
10 TABLET, DELAYED RELEASE ORAL DAILY PRN
Status: DISCONTINUED | OUTPATIENT
Start: 2022-09-25 | End: 2022-10-04 | Stop reason: HOSPADM

## 2022-09-25 RX ADMIN — SODIUM CHLORIDE 75 ML/HR: 9 INJECTION, SOLUTION INTRAVENOUS at 21:11

## 2022-09-25 RX ADMIN — AZITHROMYCIN 500 MG: 500 INJECTION, POWDER, LYOPHILIZED, FOR SOLUTION INTRAVENOUS at 21:10

## 2022-09-25 RX ADMIN — IPRATROPIUM BROMIDE AND ALBUTEROL SULFATE 3 ML: .5; 3 SOLUTION RESPIRATORY (INHALATION) at 15:08

## 2022-09-25 RX ADMIN — IOPAMIDOL 95 ML: 755 INJECTION, SOLUTION INTRAVENOUS at 17:53

## 2022-09-25 RX ADMIN — Medication 10 ML: at 23:32

## 2022-09-25 RX ADMIN — CEFTRIAXONE SODIUM 1 G: 1 INJECTION, POWDER, FOR SOLUTION INTRAMUSCULAR; INTRAVENOUS at 20:09

## 2022-09-25 RX ADMIN — SODIUM CHLORIDE 1000 ML: 9 INJECTION, SOLUTION INTRAVENOUS at 15:17

## 2022-09-26 LAB
ALBUMIN SERPL-MCNC: 3.1 G/DL (ref 3.5–5.2)
ALBUMIN SERPL-MCNC: 3.2 G/DL (ref 3.5–5.2)
ALBUMIN/GLOB SERPL: 1.1 G/DL
ALP SERPL-CCNC: 98 U/L (ref 39–117)
ALP SERPL-CCNC: 99 U/L (ref 39–117)
ALT SERPL W P-5'-P-CCNC: 18 U/L (ref 1–33)
ALT SERPL W P-5'-P-CCNC: 19 U/L (ref 1–33)
ANION GAP SERPL CALCULATED.3IONS-SCNC: 7.8 MMOL/L (ref 5–15)
AST SERPL-CCNC: 29 U/L (ref 1–32)
AST SERPL-CCNC: 30 U/L (ref 1–32)
BILIRUB CONJ SERPL-MCNC: <0.2 MG/DL (ref 0–0.3)
BILIRUB INDIRECT SERPL-MCNC: ABNORMAL MG/DL
BILIRUB SERPL-MCNC: 0.3 MG/DL (ref 0–1.2)
BILIRUB SERPL-MCNC: 0.4 MG/DL (ref 0–1.2)
BUN SERPL-MCNC: 7 MG/DL (ref 8–23)
BUN/CREAT SERPL: 13.2 (ref 7–25)
CALCIUM SPEC-SCNC: 8.3 MG/DL (ref 8.6–10.5)
CHLORIDE SERPL-SCNC: 101 MMOL/L (ref 98–107)
CO2 SERPL-SCNC: 28.2 MMOL/L (ref 22–29)
CREAT SERPL-MCNC: 0.53 MG/DL (ref 0.57–1)
CREAT SERPL-MCNC: 0.56 MG/DL (ref 0.57–1)
DEPRECATED RDW RBC AUTO: 47.4 FL (ref 37–54)
EGFRCR SERPLBLD CKD-EPI 2021: 91.8 ML/MIN/1.73
EGFRCR SERPLBLD CKD-EPI 2021: 93 ML/MIN/1.73
ERYTHROCYTE [DISTWIDTH] IN BLOOD BY AUTOMATED COUNT: 14 % (ref 12.3–15.4)
GLOBULIN UR ELPH-MCNC: 2.9 GM/DL
GLUCOSE SERPL-MCNC: 97 MG/DL (ref 65–99)
HCT VFR BLD AUTO: 27 % (ref 34–46.6)
HGB BLD-MCNC: 8.9 G/DL (ref 12–15.9)
LYMPHOCYTES # BLD MANUAL: 1.34 10*3/MM3 (ref 0.7–3.1)
LYMPHOCYTES NFR BLD MANUAL: 14 % (ref 5–12)
MCH RBC QN AUTO: 30.8 PG (ref 26.6–33)
MCHC RBC AUTO-ENTMCNC: 33 G/DL (ref 31.5–35.7)
MCV RBC AUTO: 93.4 FL (ref 79–97)
MONOCYTES # BLD: 1.87 10*3/MM3 (ref 0.1–0.9)
NEUTROPHILS # BLD AUTO: 10.16 10*3/MM3 (ref 1.7–7)
NEUTROPHILS NFR BLD MANUAL: 76 % (ref 42.7–76)
PLAT MORPH BLD: NORMAL
PLATELET # BLD AUTO: 306 10*3/MM3 (ref 140–450)
PMV BLD AUTO: 9.8 FL (ref 6–12)
POTASSIUM SERPL-SCNC: 4.3 MMOL/L (ref 3.5–5.2)
PROT SERPL-MCNC: 6 G/DL (ref 6–8.5)
PROT SERPL-MCNC: 6.4 G/DL (ref 6–8.5)
RBC # BLD AUTO: 2.89 10*6/MM3 (ref 3.77–5.28)
RBC MORPH BLD: NORMAL
SODIUM SERPL-SCNC: 137 MMOL/L (ref 136–145)
VARIANT LYMPHS NFR BLD MANUAL: 10 % (ref 19.6–45.3)
WBC MORPH BLD: NORMAL
WBC NRBC COR # BLD: 13.37 10*3/MM3 (ref 3.4–10.8)

## 2022-09-26 PROCEDURE — 97162 PT EVAL MOD COMPLEX 30 MIN: CPT

## 2022-09-26 PROCEDURE — 63710000001 DEXAMETHASONE PER 0.25 MG: Performed by: INTERNAL MEDICINE

## 2022-09-26 PROCEDURE — 94761 N-INVAS EAR/PLS OXIMETRY MLT: CPT

## 2022-09-26 PROCEDURE — 97110 THERAPEUTIC EXERCISES: CPT

## 2022-09-26 PROCEDURE — 92610 EVALUATE SWALLOWING FUNCTION: CPT

## 2022-09-26 PROCEDURE — XW033E5 INTRODUCTION OF REMDESIVIR ANTI-INFECTIVE INTO PERIPHERAL VEIN, PERCUTANEOUS APPROACH, NEW TECHNOLOGY GROUP 5: ICD-10-PCS | Performed by: INTERNAL MEDICINE

## 2022-09-26 PROCEDURE — 82248 BILIRUBIN DIRECT: CPT | Performed by: INTERNAL MEDICINE

## 2022-09-26 PROCEDURE — 36415 COLL VENOUS BLD VENIPUNCTURE: CPT | Performed by: INTERNAL MEDICINE

## 2022-09-26 PROCEDURE — 3E0333Z INTRODUCTION OF ANTI-INFLAMMATORY INTO PERIPHERAL VEIN, PERCUTANEOUS APPROACH: ICD-10-PCS | Performed by: INTERNAL MEDICINE

## 2022-09-26 PROCEDURE — 25010000002 ENOXAPARIN PER 10 MG: Performed by: INTERNAL MEDICINE

## 2022-09-26 PROCEDURE — 80053 COMPREHEN METABOLIC PANEL: CPT | Performed by: INTERNAL MEDICINE

## 2022-09-26 PROCEDURE — 97530 THERAPEUTIC ACTIVITIES: CPT

## 2022-09-26 PROCEDURE — 94664 DEMO&/EVAL PT USE INHALER: CPT

## 2022-09-26 PROCEDURE — 25010000002 REMDESIVIR 100 MG/20ML SOLUTION 1 EACH VIAL: Performed by: INTERNAL MEDICINE

## 2022-09-26 PROCEDURE — 97166 OT EVAL MOD COMPLEX 45 MIN: CPT

## 2022-09-26 PROCEDURE — 94799 UNLISTED PULMONARY SVC/PX: CPT

## 2022-09-26 PROCEDURE — 85025 COMPLETE CBC W/AUTO DIFF WBC: CPT | Performed by: INTERNAL MEDICINE

## 2022-09-26 PROCEDURE — 25010000002 CEFTRIAXONE PER 250 MG: Performed by: INTERNAL MEDICINE

## 2022-09-26 PROCEDURE — 82565 ASSAY OF CREATININE: CPT | Performed by: INTERNAL MEDICINE

## 2022-09-26 RX ORDER — DEXAMETHASONE SODIUM PHOSPHATE 10 MG/ML
6 INJECTION INTRAMUSCULAR; INTRAVENOUS
Status: DISCONTINUED | OUTPATIENT
Start: 2022-09-26 | End: 2022-09-28

## 2022-09-26 RX ORDER — OLANZAPINE 5 MG/1
5 TABLET ORAL 2 TIMES DAILY PRN
Status: DISCONTINUED | OUTPATIENT
Start: 2022-09-26 | End: 2022-09-28

## 2022-09-26 RX ORDER — ENOXAPARIN SODIUM 100 MG/ML
40 INJECTION SUBCUTANEOUS EVERY 24 HOURS
Status: DISCONTINUED | OUTPATIENT
Start: 2022-09-26 | End: 2022-10-04 | Stop reason: HOSPADM

## 2022-09-26 RX ORDER — NITROGLYCERIN 0.4 MG/1
0.4 TABLET SUBLINGUAL
Status: DISCONTINUED | OUTPATIENT
Start: 2022-09-26 | End: 2022-10-04 | Stop reason: HOSPADM

## 2022-09-26 RX ADMIN — ACETAMINOPHEN 650 MG: 325 TABLET, FILM COATED ORAL at 20:36

## 2022-09-26 RX ADMIN — LEVOTHYROXINE SODIUM 125 MCG: 0.12 TABLET ORAL at 09:12

## 2022-09-26 RX ADMIN — ATORVASTATIN CALCIUM 20 MG: 20 TABLET, FILM COATED ORAL at 09:07

## 2022-09-26 RX ADMIN — REMDESIVIR 200 MG: 100 INJECTION, POWDER, LYOPHILIZED, FOR SOLUTION INTRAVENOUS at 16:00

## 2022-09-26 RX ADMIN — LETROZOLE 2.5 MG: 2.5 TABLET, FILM COATED ORAL at 09:07

## 2022-09-26 RX ADMIN — BUDESONIDE AND FORMOTEROL FUMARATE DIHYDRATE 2 PUFF: 160; 4.5 AEROSOL RESPIRATORY (INHALATION) at 08:47

## 2022-09-26 RX ADMIN — DONEPEZIL HYDROCHLORIDE 5 MG: 5 TABLET, FILM COATED ORAL at 09:07

## 2022-09-26 RX ADMIN — DEXAMETHASONE 6 MG: 4 TABLET ORAL at 16:01

## 2022-09-26 RX ADMIN — Medication 10 ML: at 20:36

## 2022-09-26 RX ADMIN — OLANZAPINE 5 MG: 5 TABLET ORAL at 20:36

## 2022-09-26 RX ADMIN — BUDESONIDE AND FORMOTEROL FUMARATE DIHYDRATE 2 PUFF: 160; 4.5 AEROSOL RESPIRATORY (INHALATION) at 20:46

## 2022-09-26 RX ADMIN — CEFTRIAXONE SODIUM 1 G: 1 INJECTION, POWDER, FOR SOLUTION INTRAMUSCULAR; INTRAVENOUS at 20:36

## 2022-09-26 RX ADMIN — Medication 10 ML: at 09:07

## 2022-09-26 RX ADMIN — SODIUM CHLORIDE 75 ML/HR: 9 INJECTION, SOLUTION INTRAVENOUS at 09:27

## 2022-09-27 ENCOUNTER — APPOINTMENT (OUTPATIENT)
Dept: CARDIOLOGY | Facility: HOSPITAL | Age: 82
End: 2022-09-27

## 2022-09-27 LAB
ALBUMIN SERPL-MCNC: 3.2 G/DL (ref 3.5–5.2)
ALP SERPL-CCNC: 108 U/L (ref 39–117)
ALT SERPL W P-5'-P-CCNC: 19 U/L (ref 1–33)
AST SERPL-CCNC: 28 U/L (ref 1–32)
BH CV LOW VAS RIGHT GREATER SAPH BK VESSEL: 1
BH CV LOWER VASCULAR LEFT COMMON FEMORAL AUGMENT: NORMAL
BH CV LOWER VASCULAR LEFT COMMON FEMORAL COMPETENT: NORMAL
BH CV LOWER VASCULAR LEFT COMMON FEMORAL PHASIC: NORMAL
BH CV LOWER VASCULAR LEFT COMMON FEMORAL SPONT: NORMAL
BH CV LOWER VASCULAR LEFT DISTAL FEMORAL COMPRESS: NORMAL
BH CV LOWER VASCULAR LEFT GASTRONEMIUS COMPRESS: NORMAL
BH CV LOWER VASCULAR LEFT GREATER SAPH AK COMPRESS: NORMAL
BH CV LOWER VASCULAR LEFT GREATER SAPH BK COMPRESS: NORMAL
BH CV LOWER VASCULAR LEFT LESSER SAPH COMPRESS: NORMAL
BH CV LOWER VASCULAR LEFT MID FEMORAL AUGMENT: NORMAL
BH CV LOWER VASCULAR LEFT MID FEMORAL COMPETENT: NORMAL
BH CV LOWER VASCULAR LEFT MID FEMORAL COMPRESS: NORMAL
BH CV LOWER VASCULAR LEFT MID FEMORAL PHASIC: NORMAL
BH CV LOWER VASCULAR LEFT MID FEMORAL SPONT: NORMAL
BH CV LOWER VASCULAR LEFT PERONEAL COMPRESS: NORMAL
BH CV LOWER VASCULAR LEFT POPLITEAL AUGMENT: NORMAL
BH CV LOWER VASCULAR LEFT POPLITEAL COMPETENT: NORMAL
BH CV LOWER VASCULAR LEFT POPLITEAL COMPRESS: NORMAL
BH CV LOWER VASCULAR LEFT POPLITEAL PHASIC: NORMAL
BH CV LOWER VASCULAR LEFT POPLITEAL SPONT: NORMAL
BH CV LOWER VASCULAR LEFT POSTERIOR TIBIAL COMPRESS: NORMAL
BH CV LOWER VASCULAR LEFT PROFUNDA FEMORAL COMPRESS: NORMAL
BH CV LOWER VASCULAR LEFT PROXIMAL FEMORAL COMPRESS: NORMAL
BH CV LOWER VASCULAR RIGHT COMMON FEMORAL AUGMENT: NORMAL
BH CV LOWER VASCULAR RIGHT COMMON FEMORAL COMPETENT: NORMAL
BH CV LOWER VASCULAR RIGHT COMMON FEMORAL COMPRESS: NORMAL
BH CV LOWER VASCULAR RIGHT COMMON FEMORAL PHASIC: NORMAL
BH CV LOWER VASCULAR RIGHT COMMON FEMORAL SPONT: NORMAL
BH CV LOWER VASCULAR RIGHT DISTAL FEMORAL COMPRESS: NORMAL
BH CV LOWER VASCULAR RIGHT GASTRONEMIUS COMPRESS: NORMAL
BH CV LOWER VASCULAR RIGHT GREATER SAPH AK COMPRESS: NORMAL
BH CV LOWER VASCULAR RIGHT GREATER SAPH BK COMPRESS: NORMAL
BH CV LOWER VASCULAR RIGHT GREATER SAPH BK THROMBUS: NORMAL
BH CV LOWER VASCULAR RIGHT LESSER SAPH COMPRESS: NORMAL
BH CV LOWER VASCULAR RIGHT MID FEMORAL AUGMENT: NORMAL
BH CV LOWER VASCULAR RIGHT MID FEMORAL COMPETENT: NORMAL
BH CV LOWER VASCULAR RIGHT MID FEMORAL COMPRESS: NORMAL
BH CV LOWER VASCULAR RIGHT MID FEMORAL PHASIC: NORMAL
BH CV LOWER VASCULAR RIGHT MID FEMORAL SPONT: NORMAL
BH CV LOWER VASCULAR RIGHT PERONEAL COMPRESS: NORMAL
BH CV LOWER VASCULAR RIGHT POPLITEAL AUGMENT: NORMAL
BH CV LOWER VASCULAR RIGHT POPLITEAL COMPETENT: NORMAL
BH CV LOWER VASCULAR RIGHT POPLITEAL COMPRESS: NORMAL
BH CV LOWER VASCULAR RIGHT POPLITEAL PHASIC: NORMAL
BH CV LOWER VASCULAR RIGHT POPLITEAL SPONT: NORMAL
BH CV LOWER VASCULAR RIGHT POSTERIOR TIBIAL COMPRESS: NORMAL
BH CV LOWER VASCULAR RIGHT PROFUNDA FEMORAL COMPRESS: NORMAL
BH CV LOWER VASCULAR RIGHT PROXIMAL FEMORAL COMPRESS: NORMAL
BH CV LOWER VASCULAR RIGHT SAPHENOFEMORAL JUNCTION COMPRESS: NORMAL
BILIRUB CONJ SERPL-MCNC: <0.2 MG/DL (ref 0–0.3)
BILIRUB INDIRECT SERPL-MCNC: ABNORMAL MG/DL
BILIRUB SERPL-MCNC: 0.2 MG/DL (ref 0–1.2)
CREAT SERPL-MCNC: 0.5 MG/DL (ref 0.57–1)
EGFRCR SERPLBLD CKD-EPI 2021: 94.4 ML/MIN/1.73
MAXIMAL PREDICTED HEART RATE: 139 BPM
PROT SERPL-MCNC: 6.6 G/DL (ref 6–8.5)
STRESS TARGET HR: 118 BPM

## 2022-09-27 PROCEDURE — 94664 DEMO&/EVAL PT USE INHALER: CPT

## 2022-09-27 PROCEDURE — 94760 N-INVAS EAR/PLS OXIMETRY 1: CPT

## 2022-09-27 PROCEDURE — 93970 EXTREMITY STUDY: CPT

## 2022-09-27 PROCEDURE — 94799 UNLISTED PULMONARY SVC/PX: CPT

## 2022-09-27 PROCEDURE — 63710000001 DEXAMETHASONE PER 0.25 MG: Performed by: INTERNAL MEDICINE

## 2022-09-27 PROCEDURE — 80076 HEPATIC FUNCTION PANEL: CPT | Performed by: INTERNAL MEDICINE

## 2022-09-27 PROCEDURE — 25010000002 REMDESIVIR 100 MG/20ML SOLUTION 1 EACH VIAL: Performed by: INTERNAL MEDICINE

## 2022-09-27 PROCEDURE — 25010000002 CEFTRIAXONE PER 250 MG: Performed by: INTERNAL MEDICINE

## 2022-09-27 PROCEDURE — 94761 N-INVAS EAR/PLS OXIMETRY MLT: CPT

## 2022-09-27 PROCEDURE — 82565 ASSAY OF CREATININE: CPT | Performed by: INTERNAL MEDICINE

## 2022-09-27 RX ADMIN — Medication 10 ML: at 09:31

## 2022-09-27 RX ADMIN — Medication 10 ML: at 20:48

## 2022-09-27 RX ADMIN — CEFTRIAXONE SODIUM 1 G: 1 INJECTION, POWDER, FOR SOLUTION INTRAMUSCULAR; INTRAVENOUS at 20:48

## 2022-09-27 RX ADMIN — LETROZOLE 2.5 MG: 2.5 TABLET, FILM COATED ORAL at 09:30

## 2022-09-27 RX ADMIN — DONEPEZIL HYDROCHLORIDE 5 MG: 5 TABLET, FILM COATED ORAL at 09:30

## 2022-09-27 RX ADMIN — LEVOTHYROXINE SODIUM 125 MCG: 0.12 TABLET ORAL at 09:30

## 2022-09-27 RX ADMIN — BUDESONIDE AND FORMOTEROL FUMARATE DIHYDRATE 2 PUFF: 160; 4.5 AEROSOL RESPIRATORY (INHALATION) at 21:02

## 2022-09-27 RX ADMIN — ATORVASTATIN CALCIUM 20 MG: 20 TABLET, FILM COATED ORAL at 09:30

## 2022-09-27 RX ADMIN — DEXAMETHASONE 6 MG: 4 TABLET ORAL at 09:30

## 2022-09-27 RX ADMIN — REMDESIVIR 100 MG: 100 INJECTION, POWDER, LYOPHILIZED, FOR SOLUTION INTRAVENOUS at 18:35

## 2022-09-27 RX ADMIN — SODIUM CHLORIDE 75 ML/HR: 9 INJECTION, SOLUTION INTRAVENOUS at 02:12

## 2022-09-27 RX ADMIN — OLANZAPINE 5 MG: 5 TABLET ORAL at 21:20

## 2022-09-27 RX ADMIN — BUDESONIDE AND FORMOTEROL FUMARATE DIHYDRATE 2 PUFF: 160; 4.5 AEROSOL RESPIRATORY (INHALATION) at 08:41

## 2022-09-28 LAB
ALBUMIN SERPL-MCNC: 3.4 G/DL (ref 3.5–5.2)
ALP SERPL-CCNC: 99 U/L (ref 39–117)
ALT SERPL W P-5'-P-CCNC: 22 U/L (ref 1–33)
AST SERPL-CCNC: 35 U/L (ref 1–32)
BACTERIA SPEC AEROBE CULT: ABNORMAL
BILIRUB CONJ SERPL-MCNC: <0.2 MG/DL (ref 0–0.3)
BILIRUB INDIRECT SERPL-MCNC: ABNORMAL MG/DL
BILIRUB SERPL-MCNC: 0.2 MG/DL (ref 0–1.2)
CREAT SERPL-MCNC: 0.56 MG/DL (ref 0.57–1)
EGFRCR SERPLBLD CKD-EPI 2021: 91.8 ML/MIN/1.73
FUNGUS WND CULT: NORMAL
PROT SERPL-MCNC: 6.3 G/DL (ref 6–8.5)

## 2022-09-28 PROCEDURE — 97110 THERAPEUTIC EXERCISES: CPT

## 2022-09-28 PROCEDURE — 82565 ASSAY OF CREATININE: CPT | Performed by: INTERNAL MEDICINE

## 2022-09-28 PROCEDURE — 25010000002 REMDESIVIR 100 MG/20ML SOLUTION 1 EACH VIAL: Performed by: INTERNAL MEDICINE

## 2022-09-28 PROCEDURE — 25010000002 ERTAPENEM PER 500 MG: Performed by: INTERNAL MEDICINE

## 2022-09-28 PROCEDURE — 25010000002 DEXAMETHASONE PER 1 MG: Performed by: INTERNAL MEDICINE

## 2022-09-28 PROCEDURE — 94799 UNLISTED PULMONARY SVC/PX: CPT

## 2022-09-28 PROCEDURE — 25010000002 ENOXAPARIN PER 10 MG: Performed by: INTERNAL MEDICINE

## 2022-09-28 PROCEDURE — 80076 HEPATIC FUNCTION PANEL: CPT | Performed by: INTERNAL MEDICINE

## 2022-09-28 PROCEDURE — 97530 THERAPEUTIC ACTIVITIES: CPT

## 2022-09-28 RX ORDER — DEXAMETHASONE 6 MG/1
3 TABLET ORAL
Status: DISCONTINUED | OUTPATIENT
Start: 2022-09-29 | End: 2022-10-04 | Stop reason: HOSPADM

## 2022-09-28 RX ORDER — OLANZAPINE 10 MG/1
5 INJECTION, POWDER, LYOPHILIZED, FOR SOLUTION INTRAMUSCULAR EVERY 8 HOURS PRN
Status: DISCONTINUED | OUTPATIENT
Start: 2022-09-28 | End: 2022-10-04 | Stop reason: HOSPADM

## 2022-09-28 RX ORDER — DEXAMETHASONE SODIUM PHOSPHATE 4 MG/ML
3 INJECTION, SOLUTION INTRA-ARTICULAR; INTRALESIONAL; INTRAMUSCULAR; INTRAVENOUS; SOFT TISSUE
Status: DISCONTINUED | OUTPATIENT
Start: 2022-09-29 | End: 2022-10-04 | Stop reason: HOSPADM

## 2022-09-28 RX ORDER — OLANZAPINE 5 MG/1
5 TABLET ORAL 2 TIMES DAILY
Status: DISCONTINUED | OUTPATIENT
Start: 2022-09-28 | End: 2022-10-04 | Stop reason: HOSPADM

## 2022-09-28 RX ORDER — OLANZAPINE 10 MG/1
5 INJECTION, POWDER, LYOPHILIZED, FOR SOLUTION INTRAMUSCULAR ONCE
Status: COMPLETED | OUTPATIENT
Start: 2022-09-28 | End: 2022-09-28

## 2022-09-28 RX ORDER — OLANZAPINE 5 MG/1
5 TABLET ORAL NIGHTLY
Status: DISCONTINUED | OUTPATIENT
Start: 2022-09-28 | End: 2022-09-28

## 2022-09-28 RX ADMIN — Medication 10 ML: at 09:04

## 2022-09-28 RX ADMIN — BUDESONIDE AND FORMOTEROL FUMARATE DIHYDRATE 2 PUFF: 160; 4.5 AEROSOL RESPIRATORY (INHALATION) at 08:22

## 2022-09-28 RX ADMIN — REMDESIVIR 100 MG: 100 INJECTION, POWDER, LYOPHILIZED, FOR SOLUTION INTRAVENOUS at 17:09

## 2022-09-28 RX ADMIN — ENOXAPARIN SODIUM 40 MG: 100 INJECTION SUBCUTANEOUS at 17:09

## 2022-09-28 RX ADMIN — DONEPEZIL HYDROCHLORIDE 5 MG: 5 TABLET, FILM COATED ORAL at 09:04

## 2022-09-28 RX ADMIN — ERTAPENEM SODIUM 1 G: 1 INJECTION, POWDER, LYOPHILIZED, FOR SOLUTION INTRAMUSCULAR; INTRAVENOUS at 15:50

## 2022-09-28 RX ADMIN — LEVOTHYROXINE SODIUM 125 MCG: 0.12 TABLET ORAL at 06:42

## 2022-09-28 RX ADMIN — OLANZAPINE 5 MG: 5 TABLET ORAL at 21:57

## 2022-09-28 RX ADMIN — LETROZOLE 2.5 MG: 2.5 TABLET, FILM COATED ORAL at 09:04

## 2022-09-28 RX ADMIN — OLANZAPINE 5 MG: 10 INJECTION, POWDER, FOR SOLUTION INTRAMUSCULAR at 04:21

## 2022-09-28 RX ADMIN — DEXAMETHASONE SODIUM PHOSPHATE 6 MG: 10 INJECTION INTRAMUSCULAR; INTRAVENOUS at 09:05

## 2022-09-28 RX ADMIN — LEVOTHYROXINE SODIUM 125 MCG: 0.12 TABLET ORAL at 06:44

## 2022-09-28 RX ADMIN — BUDESONIDE AND FORMOTEROL FUMARATE DIHYDRATE 2 PUFF: 160; 4.5 AEROSOL RESPIRATORY (INHALATION) at 20:59

## 2022-09-28 RX ADMIN — SODIUM CHLORIDE 75 ML/HR: 9 INJECTION, SOLUTION INTRAVENOUS at 14:00

## 2022-09-28 RX ADMIN — OLANZAPINE 5 MG: 5 TABLET ORAL at 09:04

## 2022-09-28 RX ADMIN — OLANZAPINE 5 MG: 10 INJECTION, POWDER, FOR SOLUTION INTRAMUSCULAR at 13:57

## 2022-09-28 RX ADMIN — SODIUM CHLORIDE 75 ML/HR: 9 INJECTION, SOLUTION INTRAVENOUS at 00:31

## 2022-09-28 RX ADMIN — ATORVASTATIN CALCIUM 20 MG: 20 TABLET, FILM COATED ORAL at 09:37

## 2022-09-29 LAB
ALBUMIN SERPL-MCNC: 2.9 G/DL (ref 3.5–5.2)
ALBUMIN/GLOB SERPL: 1 G/DL
ALP SERPL-CCNC: 82 U/L (ref 39–117)
ALT SERPL W P-5'-P-CCNC: 20 U/L (ref 1–33)
ANION GAP SERPL CALCULATED.3IONS-SCNC: 6.8 MMOL/L (ref 5–15)
AST SERPL-CCNC: 26 U/L (ref 1–32)
BASOPHILS # BLD MANUAL: 0.08 10*3/MM3 (ref 0–0.2)
BASOPHILS NFR BLD MANUAL: 1 % (ref 0–1.5)
BILIRUB CONJ SERPL-MCNC: <0.2 MG/DL (ref 0–0.3)
BILIRUB SERPL-MCNC: 0.2 MG/DL (ref 0–1.2)
BUN SERPL-MCNC: 11 MG/DL (ref 8–23)
BUN/CREAT SERPL: 22.9 (ref 7–25)
CALCIUM SPEC-SCNC: 8.3 MG/DL (ref 8.6–10.5)
CHLORIDE SERPL-SCNC: 102 MMOL/L (ref 98–107)
CO2 SERPL-SCNC: 31.2 MMOL/L (ref 22–29)
CREAT SERPL-MCNC: 0.48 MG/DL (ref 0.57–1)
DEPRECATED RDW RBC AUTO: 49.7 FL (ref 37–54)
EGFRCR SERPLBLD CKD-EPI 2021: 95.3 ML/MIN/1.73
ERYTHROCYTE [DISTWIDTH] IN BLOOD BY AUTOMATED COUNT: 14.3 % (ref 12.3–15.4)
GLOBULIN UR ELPH-MCNC: 3 GM/DL
GLUCOSE SERPL-MCNC: 90 MG/DL (ref 65–99)
HCT VFR BLD AUTO: 29.7 % (ref 34–46.6)
HGB BLD-MCNC: 9.6 G/DL (ref 12–15.9)
LYMPHOCYTES # BLD MANUAL: 0.4 10*3/MM3 (ref 0.7–3.1)
LYMPHOCYTES NFR BLD MANUAL: 14.1 % (ref 5–12)
MCH RBC QN AUTO: 30.7 PG (ref 26.6–33)
MCHC RBC AUTO-ENTMCNC: 32.3 G/DL (ref 31.5–35.7)
MCV RBC AUTO: 94.9 FL (ref 79–97)
METAMYELOCYTES NFR BLD MANUAL: 1 % (ref 0–0)
MONOCYTES # BLD: 1.1 10*3/MM3 (ref 0.1–0.9)
NEUTROPHILS # BLD AUTO: 6.15 10*3/MM3 (ref 1.7–7)
NEUTROPHILS NFR BLD MANUAL: 78.8 % (ref 42.7–76)
PLAT MORPH BLD: NORMAL
PLATELET # BLD AUTO: 545 10*3/MM3 (ref 140–450)
PMV BLD AUTO: 9.4 FL (ref 6–12)
POTASSIUM SERPL-SCNC: 4.1 MMOL/L (ref 3.5–5.2)
PROT SERPL-MCNC: 5.9 G/DL (ref 6–8.5)
RBC # BLD AUTO: 3.13 10*6/MM3 (ref 3.77–5.28)
RBC MORPH BLD: NORMAL
SODIUM SERPL-SCNC: 140 MMOL/L (ref 136–145)
VARIANT LYMPHS NFR BLD MANUAL: 5.1 % (ref 19.6–45.3)
WBC MORPH BLD: NORMAL
WBC NRBC COR # BLD: 7.81 10*3/MM3 (ref 3.4–10.8)

## 2022-09-29 PROCEDURE — 94799 UNLISTED PULMONARY SVC/PX: CPT

## 2022-09-29 PROCEDURE — 82248 BILIRUBIN DIRECT: CPT | Performed by: INTERNAL MEDICINE

## 2022-09-29 PROCEDURE — 85025 COMPLETE CBC W/AUTO DIFF WBC: CPT | Performed by: INTERNAL MEDICINE

## 2022-09-29 PROCEDURE — 25010000002 REMDESIVIR 100 MG/20ML SOLUTION 1 EACH VIAL: Performed by: INTERNAL MEDICINE

## 2022-09-29 PROCEDURE — 25010000002 ERTAPENEM PER 500 MG: Performed by: INTERNAL MEDICINE

## 2022-09-29 PROCEDURE — 25010000002 ENOXAPARIN PER 10 MG: Performed by: INTERNAL MEDICINE

## 2022-09-29 PROCEDURE — 80053 COMPREHEN METABOLIC PANEL: CPT | Performed by: INTERNAL MEDICINE

## 2022-09-29 PROCEDURE — 85007 BL SMEAR W/DIFF WBC COUNT: CPT | Performed by: INTERNAL MEDICINE

## 2022-09-29 RX ADMIN — ERTAPENEM SODIUM 1 G: 1 INJECTION, POWDER, LYOPHILIZED, FOR SOLUTION INTRAMUSCULAR; INTRAVENOUS at 16:11

## 2022-09-29 RX ADMIN — BUDESONIDE AND FORMOTEROL FUMARATE DIHYDRATE 2 PUFF: 160; 4.5 AEROSOL RESPIRATORY (INHALATION) at 07:54

## 2022-09-29 RX ADMIN — LETROZOLE 2.5 MG: 2.5 TABLET, FILM COATED ORAL at 08:17

## 2022-09-29 RX ADMIN — OLANZAPINE 5 MG: 5 TABLET ORAL at 20:11

## 2022-09-29 RX ADMIN — LEVOTHYROXINE SODIUM 125 MCG: 0.12 TABLET ORAL at 08:17

## 2022-09-29 RX ADMIN — DEXAMETHASONE 3 MG: 6 TABLET ORAL at 08:17

## 2022-09-29 RX ADMIN — ENOXAPARIN SODIUM 40 MG: 100 INJECTION SUBCUTANEOUS at 16:28

## 2022-09-29 RX ADMIN — Medication 10 ML: at 20:12

## 2022-09-29 RX ADMIN — ACETAMINOPHEN 650 MG: 325 TABLET, FILM COATED ORAL at 20:11

## 2022-09-29 RX ADMIN — DONEPEZIL HYDROCHLORIDE 5 MG: 5 TABLET, FILM COATED ORAL at 08:17

## 2022-09-29 RX ADMIN — OLANZAPINE 5 MG: 5 TABLET ORAL at 08:17

## 2022-09-29 RX ADMIN — ATORVASTATIN CALCIUM 20 MG: 20 TABLET, FILM COATED ORAL at 08:17

## 2022-09-29 RX ADMIN — REMDESIVIR 100 MG: 100 INJECTION, POWDER, LYOPHILIZED, FOR SOLUTION INTRAVENOUS at 16:31

## 2022-09-29 RX ADMIN — Medication 10 ML: at 08:19

## 2022-09-29 RX ADMIN — BUDESONIDE AND FORMOTEROL FUMARATE DIHYDRATE 2 PUFF: 160; 4.5 AEROSOL RESPIRATORY (INHALATION) at 21:20

## 2022-09-30 ENCOUNTER — APPOINTMENT (OUTPATIENT)
Dept: LAB | Facility: HOSPITAL | Age: 82
End: 2022-09-30

## 2022-09-30 LAB
ALBUMIN SERPL-MCNC: 3.2 G/DL (ref 3.5–5.2)
ALP SERPL-CCNC: 81 U/L (ref 39–117)
ALT SERPL W P-5'-P-CCNC: 23 U/L (ref 1–33)
ANION GAP SERPL CALCULATED.3IONS-SCNC: 9.4 MMOL/L (ref 5–15)
AST SERPL-CCNC: 28 U/L (ref 1–32)
BACTERIA SPEC AEROBE CULT: NORMAL
BACTERIA SPEC AEROBE CULT: NORMAL
BASOPHILS # BLD AUTO: 0.01 10*3/MM3 (ref 0–0.2)
BASOPHILS NFR BLD AUTO: 0.1 % (ref 0–1.5)
BILIRUB CONJ SERPL-MCNC: <0.2 MG/DL (ref 0–0.3)
BILIRUB INDIRECT SERPL-MCNC: ABNORMAL MG/DL
BILIRUB SERPL-MCNC: 0.2 MG/DL (ref 0–1.2)
BUN SERPL-MCNC: 12 MG/DL (ref 8–23)
BUN/CREAT SERPL: 24 (ref 7–25)
CALCIUM SPEC-SCNC: 8.8 MG/DL (ref 8.6–10.5)
CHLORIDE SERPL-SCNC: 102 MMOL/L (ref 98–107)
CO2 SERPL-SCNC: 29.6 MMOL/L (ref 22–29)
CREAT SERPL-MCNC: 0.5 MG/DL (ref 0.57–1)
DEPRECATED RDW RBC AUTO: 46.9 FL (ref 37–54)
EGFRCR SERPLBLD CKD-EPI 2021: 94.4 ML/MIN/1.73
EOSINOPHIL # BLD AUTO: 0.01 10*3/MM3 (ref 0–0.4)
EOSINOPHIL NFR BLD AUTO: 0.1 % (ref 0.3–6.2)
ERYTHROCYTE [DISTWIDTH] IN BLOOD BY AUTOMATED COUNT: 14 % (ref 12.3–15.4)
GLUCOSE SERPL-MCNC: 80 MG/DL (ref 65–99)
HCT VFR BLD AUTO: 32.3 % (ref 34–46.6)
HGB BLD-MCNC: 10.4 G/DL (ref 12–15.9)
IMM GRANULOCYTES # BLD AUTO: 0.29 10*3/MM3 (ref 0–0.05)
IMM GRANULOCYTES NFR BLD AUTO: 3.7 % (ref 0–0.5)
LYMPHOCYTES # BLD AUTO: 1.04 10*3/MM3 (ref 0.7–3.1)
LYMPHOCYTES NFR BLD AUTO: 13.2 % (ref 19.6–45.3)
MCH RBC QN AUTO: 29.8 PG (ref 26.6–33)
MCHC RBC AUTO-ENTMCNC: 32.2 G/DL (ref 31.5–35.7)
MCV RBC AUTO: 92.6 FL (ref 79–97)
MONOCYTES # BLD AUTO: 1.34 10*3/MM3 (ref 0.1–0.9)
MONOCYTES NFR BLD AUTO: 17 % (ref 5–12)
NEUTROPHILS NFR BLD AUTO: 5.2 10*3/MM3 (ref 1.7–7)
NEUTROPHILS NFR BLD AUTO: 65.9 % (ref 42.7–76)
NRBC BLD AUTO-RTO: 0 /100 WBC (ref 0–0.2)
PLATELET # BLD AUTO: 611 10*3/MM3 (ref 140–450)
PMV BLD AUTO: 9.2 FL (ref 6–12)
POTASSIUM SERPL-SCNC: 4.1 MMOL/L (ref 3.5–5.2)
PROT SERPL-MCNC: 6 G/DL (ref 6–8.5)
RBC # BLD AUTO: 3.49 10*6/MM3 (ref 3.77–5.28)
SODIUM SERPL-SCNC: 141 MMOL/L (ref 136–145)
WBC NRBC COR # BLD: 7.89 10*3/MM3 (ref 3.4–10.8)

## 2022-09-30 PROCEDURE — 94799 UNLISTED PULMONARY SVC/PX: CPT

## 2022-09-30 PROCEDURE — 97535 SELF CARE MNGMENT TRAINING: CPT

## 2022-09-30 PROCEDURE — 97530 THERAPEUTIC ACTIVITIES: CPT

## 2022-09-30 PROCEDURE — 97110 THERAPEUTIC EXERCISES: CPT

## 2022-09-30 PROCEDURE — 94760 N-INVAS EAR/PLS OXIMETRY 1: CPT

## 2022-09-30 PROCEDURE — 80076 HEPATIC FUNCTION PANEL: CPT | Performed by: INTERNAL MEDICINE

## 2022-09-30 PROCEDURE — 25010000002 ERTAPENEM PER 500 MG: Performed by: INTERNAL MEDICINE

## 2022-09-30 PROCEDURE — 25010000002 REMDESIVIR 100 MG/20ML SOLUTION 1 EACH VIAL: Performed by: INTERNAL MEDICINE

## 2022-09-30 PROCEDURE — 94761 N-INVAS EAR/PLS OXIMETRY MLT: CPT

## 2022-09-30 PROCEDURE — 94664 DEMO&/EVAL PT USE INHALER: CPT

## 2022-09-30 PROCEDURE — 80048 BASIC METABOLIC PNL TOTAL CA: CPT | Performed by: HOSPITALIST

## 2022-09-30 PROCEDURE — 25010000002 ENOXAPARIN PER 10 MG: Performed by: INTERNAL MEDICINE

## 2022-09-30 PROCEDURE — 85025 COMPLETE CBC W/AUTO DIFF WBC: CPT | Performed by: HOSPITALIST

## 2022-09-30 RX ADMIN — LETROZOLE 2.5 MG: 2.5 TABLET, FILM COATED ORAL at 08:00

## 2022-09-30 RX ADMIN — REMDESIVIR 100 MG: 100 INJECTION, POWDER, LYOPHILIZED, FOR SOLUTION INTRAVENOUS at 16:43

## 2022-09-30 RX ADMIN — BUDESONIDE AND FORMOTEROL FUMARATE DIHYDRATE 2 PUFF: 160; 4.5 AEROSOL RESPIRATORY (INHALATION) at 19:54

## 2022-09-30 RX ADMIN — ATORVASTATIN CALCIUM 20 MG: 20 TABLET, FILM COATED ORAL at 08:00

## 2022-09-30 RX ADMIN — OLANZAPINE 5 MG: 5 TABLET ORAL at 19:45

## 2022-09-30 RX ADMIN — BUDESONIDE AND FORMOTEROL FUMARATE DIHYDRATE 2 PUFF: 160; 4.5 AEROSOL RESPIRATORY (INHALATION) at 08:36

## 2022-09-30 RX ADMIN — Medication 10 ML: at 19:45

## 2022-09-30 RX ADMIN — DONEPEZIL HYDROCHLORIDE 5 MG: 5 TABLET, FILM COATED ORAL at 08:01

## 2022-09-30 RX ADMIN — ERTAPENEM SODIUM 1 G: 1 INJECTION, POWDER, LYOPHILIZED, FOR SOLUTION INTRAMUSCULAR; INTRAVENOUS at 16:05

## 2022-09-30 RX ADMIN — LEVOTHYROXINE SODIUM 125 MCG: 0.12 TABLET ORAL at 07:14

## 2022-09-30 RX ADMIN — DEXAMETHASONE 3 MG: 6 TABLET ORAL at 08:00

## 2022-09-30 RX ADMIN — ACETAMINOPHEN 650 MG: 325 TABLET, FILM COATED ORAL at 19:45

## 2022-09-30 RX ADMIN — Medication 10 ML: at 08:02

## 2022-09-30 RX ADMIN — ENOXAPARIN SODIUM 40 MG: 100 INJECTION SUBCUTANEOUS at 16:06

## 2022-09-30 RX ADMIN — OLANZAPINE 5 MG: 5 TABLET ORAL at 08:00

## 2022-10-01 PROCEDURE — 94799 UNLISTED PULMONARY SVC/PX: CPT

## 2022-10-01 PROCEDURE — 94760 N-INVAS EAR/PLS OXIMETRY 1: CPT

## 2022-10-01 PROCEDURE — 25010000002 ENOXAPARIN PER 10 MG: Performed by: INTERNAL MEDICINE

## 2022-10-01 PROCEDURE — 94761 N-INVAS EAR/PLS OXIMETRY MLT: CPT

## 2022-10-01 PROCEDURE — 94664 DEMO&/EVAL PT USE INHALER: CPT

## 2022-10-01 PROCEDURE — 25010000002 ERTAPENEM PER 500 MG: Performed by: INTERNAL MEDICINE

## 2022-10-01 RX ORDER — OLANZAPINE 5 MG/1
5 TABLET ORAL NIGHTLY
Status: CANCELLED | OUTPATIENT
Start: 2022-10-01

## 2022-10-01 RX ADMIN — OLANZAPINE 5 MG: 5 TABLET ORAL at 18:06

## 2022-10-01 RX ADMIN — DONEPEZIL HYDROCHLORIDE 5 MG: 5 TABLET, FILM COATED ORAL at 09:46

## 2022-10-01 RX ADMIN — ENOXAPARIN SODIUM 40 MG: 100 INJECTION SUBCUTANEOUS at 15:36

## 2022-10-01 RX ADMIN — Medication 10 ML: at 21:00

## 2022-10-01 RX ADMIN — ATORVASTATIN CALCIUM 20 MG: 20 TABLET, FILM COATED ORAL at 09:46

## 2022-10-01 RX ADMIN — LETROZOLE 2.5 MG: 2.5 TABLET, FILM COATED ORAL at 09:46

## 2022-10-01 RX ADMIN — BUDESONIDE AND FORMOTEROL FUMARATE DIHYDRATE 2 PUFF: 160; 4.5 AEROSOL RESPIRATORY (INHALATION) at 20:26

## 2022-10-01 RX ADMIN — ERTAPENEM SODIUM 1 G: 1 INJECTION, POWDER, LYOPHILIZED, FOR SOLUTION INTRAMUSCULAR; INTRAVENOUS at 14:42

## 2022-10-01 RX ADMIN — LEVOTHYROXINE SODIUM 125 MCG: 0.12 TABLET ORAL at 09:47

## 2022-10-01 RX ADMIN — BUDESONIDE AND FORMOTEROL FUMARATE DIHYDRATE 2 PUFF: 160; 4.5 AEROSOL RESPIRATORY (INHALATION) at 08:50

## 2022-10-01 RX ADMIN — DEXAMETHASONE 3 MG: 6 TABLET ORAL at 09:46

## 2022-10-01 RX ADMIN — OLANZAPINE 5 MG: 5 TABLET ORAL at 09:46

## 2022-10-01 NOTE — PROGRESS NOTES
"  Infectious Diseases Progress Note    Queta Powell MD     [facility]  Los: 6 days  Patient Identification:  Name: Kennedi Cardenas  Age: 82 y.o.  Sex: female  :  1940  MRN: [id number]         Primary Care Physician: Duncan Garcia MD            Subjective: Feeling better and more interactive not in restraints.  Interval History: see consultation note    Objective:    Scheduled Meds:atorvastatin, 20 mg, Oral, Daily  budesonide-formoterol, 2 puff, Inhalation, BID - RT  dexamethasone, 3 mg, Oral, Daily With Breakfast   Or  dexamethasone, 3 mg, Intravenous, Daily With Breakfast  donepezil, 5 mg, Oral, Daily  enoxaparin, 40 mg, Subcutaneous, Q24H  ertapenem, 1 g, Intravenous, Q24H  letrozole, 2.5 mg, Oral, Daily  levothyroxine, 125 mcg, Oral, Q AM  OLANZapine, 5 mg, Oral, BID  sodium chloride, 10 mL, Intravenous, Q12H      Continuous Infusions:     Vital signs in last 24 hours:  Temp:  [97.3 °F (36.3 °C)-97.6 °F (36.4 °C)] 97.4 °F (36.3 °C)  Heart Rate:  [74-91] 79  Resp:  [16-20] 18  BP: (120-165)/(72-93) 120/72    Intake/Output:    Intake/Output Summary (Last 24 hours) at 10/1/2022 1949  Last data filed at 10/1/2022 1538  Gross per 24 hour   Intake 120 ml   Output 1050 ml   Net -930 ml       Exam:  /72 (BP Location: Left arm, Patient Position: Lying)   Pulse 79   Temp 97.4 °F (36.3 °C) (Oral)   Resp 18   Ht 162.6 cm (64\")   Wt 66.3 kg (146 lb 2.6 oz)   SpO2 93%   BMI 25.09 kg/m²   Patient is examined using the personal protective equipment as per guidelines from infection control for this particular patient as enacted.  Hand washing was performed before and after patient interaction.  General Appearance:  Much more appropriate and interactive.  Does not appear to be in any acute distress.                          Head:    Normocephalic, without obvious abnormality, atraumatic                           Eyes:    PERRL, conjunctivae/corneas clear, EOM's intact, both eyes                "          Throat:   Lips, tongue, gums normal; oral mucosa pink and moist                           Neck:   Supple, symmetrical, trachea midline, no JVD                         Lungs:    Clear to auscultation bilaterally, respirations unlabored                 Chest Wall:    No tenderness or deformity                          Heart:    regular                  Abdomen:     Obese soft and non tender                  Extremities:   Extremities normal, atraumatic, no cyanosis or edema                        Pulses:   Pulses palpable in all extremities                            Skin:   Skin is warm and dry,  no rashes or palpable lesions                  Neurologic:   Grossly non focal but not oriented       Data Review:    I reviewed the patient's new clinical results.  Results from last 7 days   Lab Units 09/30/22  0722 09/29/22  0659 09/26/22  0810 09/25/22  1516   WBC 10*3/mm3 7.89 7.81 13.37* 15.53*   HEMOGLOBIN g/dL 10.4* 9.6* 8.9* 10.4*   PLATELETS 10*3/mm3 611* 545* 306 300     Results from last 7 days   Lab Units 09/30/22  0722 09/29/22  0659 09/28/22  0606 09/27/22  0635 09/26/22  1600 09/26/22  0810 09/25/22  1516   SODIUM mmol/L 141 140  --   --   --  137 134*   POTASSIUM mmol/L 4.1 4.1  --   --   --  4.3 4.2   CHLORIDE mmol/L 102 102  --   --   --  101 96*   CO2 mmol/L 29.6* 31.2*  --   --   --  28.2 27.4   BUN mg/dL 12 11  --   --   --  7* 12   CREATININE mg/dL 0.50* 0.48* 0.56* 0.50* 0.56* 0.53* 0.68   CALCIUM mg/dL 8.8 8.3*  --   --   --  8.3* 9.5   GLUCOSE mg/dL 80 90  --   --   --  97 101*       Microbiology Results (last 10 days)     Procedure Component Value - Date/Time    Blood Culture - Blood, Arm, Left [[id number]]  (Normal) Collected: 09/25/22 1939    Lab Status: Final result Specimen: Blood from Arm, Left Updated: 09/30/22 1947     Blood Culture No growth at 5 days    Blood Culture - Blood, Arm, Right [[id number]]  (Normal) Collected: 09/25/22 1939    Lab Status: Final result Specimen: Blood from  Arm, Right Updated: 09/30/22 1947     Blood Culture No growth at 5 days    Respiratory Panel PCR w/COVID-19(SARS-CoV-2) KHANG/NOAM/DAKOTA/PAD/COR/MAD/VAISHNAVI In-House, NP Swab in UTM/VTM, 3-4 HR TAT - Swab, Nasopharynx [[id number]]  (Abnormal) Collected: 09/25/22 1922    Lab Status: Final result Specimen: Swab from Nasopharynx Updated: 09/25/22 2047     ADENOVIRUS, PCR Not Detected     Coronavirus 229E Not Detected     Coronavirus HKU1 Not Detected     Coronavirus NL63 Not Detected     Coronavirus OC43 Not Detected     COVID19 Detected     Human Metapneumovirus Not Detected     Human Rhinovirus/Enterovirus Not Detected     Influenza A PCR Not Detected     Influenza B PCR Not Detected     Parainfluenza Virus 1 Not Detected     Parainfluenza Virus 2 Not Detected     Parainfluenza Virus 3 Not Detected     Parainfluenza Virus 4 Not Detected     RSV, PCR Not Detected     Bordetella pertussis pcr Not Detected     Bordetella parapertussis PCR Not Detected     Chlamydophila pneumoniae PCR Not Detected     Mycoplasma pneumo by PCR Not Detected    Narrative:      In the setting of a positive respiratory panel with a viral infection PLUS a negative procalcitonin without other underlying concern for bacterial infection, consider observing off antibiotics or discontinuation of antibiotics and continue supportive care. If the respiratory panel is positive for atypical bacterial infection (Bordetella pertussis, Chlamydophila pneumoniae, or Mycoplasma pneumoniae), consider antibiotic de-escalation to target atypical bacterial infection.    Urine Culture - Urine, Urine, Clean Catch [[id number]]  (Abnormal)  (Susceptibility) Collected: 09/25/22 1703    Lab Status: Final result Specimen: Urine, Clean Catch Updated: 09/28/22 1140     Urine Culture >100,000 CFU/mL Escherichia coli ESBL     Comment: Consider infectious disease consult.  Susceptibility results may not correlate to clinical outcomes.       Narrative:      Colonization of the urinary  tract without infection is common. Treatment is discouraged unless the patient is symptomatic, pregnant, or undergoing an invasive urologic procedure.  Recent outcomes data supports the use of pip/tazo in the treatment of susceptible ESBL infections for uncomplicated UTI. Consider use of pip/tazo as a carbapenem-sparing regimen in applicable patients.    Susceptibility      Escherichia coli ESBL      BERNADETTE      Amikacin Susceptible      Ertapenem Susceptible      Gentamicin Resistant     Levofloxacin Resistant     Meropenem Susceptible      Nitrofurantoin Susceptible      Piperacillin + Tazobactam Susceptible      Tobramycin Resistant     Trimethoprim + Sulfamethoxazole Susceptible                                 Assessment:    HLD (hyperlipidemia)    Essential hypertension    Vitamin D deficiency    Hypothyroidism    Tobacco abuse    Dementia without behavioral disturbance (HCC)    Vascular dementia without behavioral disturbance (HCC)    Community acquired pneumonia of right upper lobe of lung    UTI (urinary tract infection)    COVID-19 virus infection    Sepsis (HCC)  1-systemic illness with metabolic encephalopathy secondary to  2-Combination of factors:              -Encephalopathy secondary to untreated urinary tract infection now on proper antibiotic therapy              -Hypoxic respiratory failure due to COVID-19 infection with exacerbation of underlying lung disease                       -Exacerbation of underlying dementia diminished reserve with above metabolic stress.  3-recent diagnosis of lung cancer  4-other diagnosis per primary team.     Recommendations/Discussions:  Continue present treatment   See my recommendation and discussion on 09/29/2022  Queta Powell MD  10/1/2022  19:49 EDT    Much of this encounter note is an electronic transcription/translation of spoken language to printed text. The electronic translation of spoken language may permit erroneous, or at times, nonsensical words or phrases  to be inadvertently transcribed; Although I have reviewed the note for such errors, some may still exist

## 2022-10-01 NOTE — PROGRESS NOTES
Name: Kennedi Cardenas ADMIT: 2022   : 1940  PCP: Duncan Garcia MD    MRN: [id number] LOS: 6 days   AGE/SEX: 82 y.o. female  ROOM: [facility]   Subjective   Chief Complaint   Patient presents with   • Weakness - Generalized       Dyspnea better  On oxygen   On steroids  On abx    ROS  No f/c  No n/v  No cp/palp  +soa/cough    Objective   Vital Signs  Temp:  [97.1 °F (36.2 °C)-97.6 °F (36.4 °C)] 97.4 °F (36.3 °C)  Heart Rate:  [74-91] 77  Resp:  [18-20] 20  BP: (125-165)/(82-93) 165/90  SpO2:  [90 %-100 %] 90 %  on  Flow (L/min):  [4-6] 4;   Device (Oxygen Therapy): nasal cannula  Body mass index is 25.09 kg/m².    Physical Exam  Constitutional:       General: She is not in acute distress.     Appearance: She is ill-appearing.   HENT:      Head: Normocephalic and atraumatic.   Eyes:      General: No scleral icterus.  Cardiovascular:      Rate and Rhythm: Regular rhythm.      Heart sounds: Normal heart sounds.   Pulmonary:      Effort: Respiratory distress (slight) present.      Breath sounds: No wheezing.   Abdominal:      General: There is no distension.      Palpations: Abdomen is soft.   Musculoskeletal:      Cervical back: Neck supple.   Skin:     Coloration: Skin is pale.   Neurological:      Mental Status: She is alert.   Psychiatric:         Behavior: Behavior normal.     elderly  Chronically ill  Poor memory    Results Review:       I reviewed the patient's new clinical results.  Results from last 7 days   Lab Units 22  0722 22  0659 22  0810 22  1516   WBC 10*3/mm3 7.89 7.81 13.37* 15.53*   HEMOGLOBIN g/dL 10.4* 9.6* 8.9* 10.4*   PLATELETS 10*3/mm3 611* 545* 306 300     Results from last 7 days   Lab Units 22  0722 22  0659 22  0606 22  0635 22  1600 22  0810 22  1516   SODIUM mmol/L 141 140  --   --   --  137 134*   POTASSIUM mmol/L 4.1 4.1  --   --   --  4.3 4.2   CHLORIDE mmol/L 102 102  --   --   --  101 96*   CO2 mmol/L  29.6* 31.2*  --   --   --  28.2 27.4   BUN mg/dL 12 11  --   --   --  7* 12   CREATININE mg/dL 0.50* 0.48* 0.56* 0.50*   < > 0.53* 0.68   GLUCOSE mg/dL 80 90  --   --   --  97 101*    < > = values in this interval not displayed.   Estimated Creatinine Clearance: 82.6 mL/min (A) (by C-G formula based on SCr of 0.5 mg/dL (L)).  Results from last 7 days   Lab Units 09/30/22 0722 09/29/22  0659 09/28/22  0606 09/27/22  0635   ALBUMIN g/dL 3.20* 2.90* 3.40* 3.20*   BILIRUBIN mg/dL 0.2 0.2 0.2 0.2   ALK PHOS U/L 81 82 99 108   AST (SGOT) U/L 28 26 35* 28   ALT (SGPT) U/L 23 20 22 19     Results from last 7 days   Lab Units 09/30/22 0722 09/29/22 0659 09/28/22  0606 09/27/22  0635 09/26/22  1600 09/26/22  0810 09/25/22  1516   CALCIUM mg/dL 8.8 8.3*  --   --   --  8.3* 9.5   ALBUMIN g/dL 3.20* 2.90* 3.40* 3.20*   < > 3.10* 3.80    < > = values in this interval not displayed.     Results from last 7 days   Lab Units 09/25/22 1939   PROCALCITONIN ng/mL 0.14   LACTATE mmol/L 1.0       Coag     HbA1C   Lab Results   Component Value Date    HGBA1C 5.5 02/17/2016    HGBA1C 5.9 (H) 10/14/2015    HGBA1C 5.8 (H) 04/15/2015     Infection   Results from last 7 days   Lab Units 09/25/22 1939 09/25/22  1703   BLOODCX  No growth at 5 days  No growth at 5 days  --    URINECX   --  >100,000 CFU/mL Escherichia coli ESBL*   PROCALCITONIN ng/mL 0.14  --      Radiology(recent) No radiology results for the last day  No results found for: TROPONINT, TROPONINI, BNP  No components found for: TSH;2    atorvastatin, 20 mg, Oral, Daily  budesonide-formoterol, 2 puff, Inhalation, BID - RT  dexamethasone, 3 mg, Oral, Daily With Breakfast   Or  dexamethasone, 3 mg, Intravenous, Daily With Breakfast  donepezil, 5 mg, Oral, Daily  enoxaparin, 40 mg, Subcutaneous, Q24H  ertapenem, 1 g, Intravenous, Q24H  letrozole, 2.5 mg, Oral, Daily  levothyroxine, 125 mcg, Oral, Q AM  OLANZapine, 5 mg, Oral, BID  sodium chloride, 10 mL, Intravenous, Q12H        Diet Regular; Cardiac      Assessment & Plan      Active Hospital Problems    Diagnosis  POA   • Community acquired pneumonia of right upper lobe of lung [J18.9]  Yes   • UTI (urinary tract infection) [N39.0]  Yes   • COVID-19 virus infection [U07.1]  Yes   • Sepsis (HCC) [A41.9]  Yes   • Vascular dementia without behavioral disturbance (HCC) [F01.50]  Yes   • Dementia without behavioral disturbance (HCC) [F03.90]  Yes   • Tobacco abuse [Z72.0]  Yes   • Hypothyroidism [E03.9]  Yes   • Essential hypertension [I10]  Yes   • HLD (hyperlipidemia) [E78.5]  Yes   • Vitamin D deficiency [E55.9]  Yes      Resolved Hospital Problems   No resolved problems to display.         · COVID PNA with AHRF: . Respiratory status continues to improve with remdesivir and dexamethasone.  D-dimer was elevated but no clot on CTA.  Duplex negative.  Pulmonology evaulated.  · UTI: E. Coli. ESBL on Cx.  Infectious disease input appreciated.  Continue ertapenem.  · Lung Cancer: Discussed with Oncology. They are going to follow up with her outpatient in clinic.  · Hypothyroidism: Synthroid  · Vascular Dementia with metabolic encephalopathy 2/2 covid and UTI: Schedule zyprexa nightly. Out of restraints  · HTN: Monitor  · HLD: Statin  · PPx: Lovenox  · Disposition: TBD    MIRANDA RN    Greater than 36 minutes spent with greater than 50% counseling and coordinating care  Reviewed records    Mor Chow MD  [facility]  10/01/22  10:48 EDT

## 2022-10-01 NOTE — PLAN OF CARE
Goal Outcome Evaluation:              Outcome Evaluation: Restraints remain off.  Pt reoriented frequently and reminded to stay in the bed.  Pleasantly confused but cooperative.  No respiratory distress.  O2 at 3LNC

## 2022-10-02 LAB
ANION GAP SERPL CALCULATED.3IONS-SCNC: 6 MMOL/L (ref 5–15)
BUN SERPL-MCNC: 13 MG/DL (ref 8–23)
BUN/CREAT SERPL: 26 (ref 7–25)
CALCIUM SPEC-SCNC: 8.5 MG/DL (ref 8.6–10.5)
CHLORIDE SERPL-SCNC: 101 MMOL/L (ref 98–107)
CO2 SERPL-SCNC: 32 MMOL/L (ref 22–29)
CREAT SERPL-MCNC: 0.5 MG/DL (ref 0.57–1)
CRP SERPL-MCNC: 2.08 MG/DL (ref 0–0.5)
EGFRCR SERPLBLD CKD-EPI 2021: 94.4 ML/MIN/1.73
GLUCOSE SERPL-MCNC: 93 MG/DL (ref 65–99)
POTASSIUM SERPL-SCNC: 4 MMOL/L (ref 3.5–5.2)
SODIUM SERPL-SCNC: 139 MMOL/L (ref 136–145)

## 2022-10-02 PROCEDURE — 80048 BASIC METABOLIC PNL TOTAL CA: CPT | Performed by: INTERNAL MEDICINE

## 2022-10-02 PROCEDURE — 94799 UNLISTED PULMONARY SVC/PX: CPT

## 2022-10-02 PROCEDURE — 86140 C-REACTIVE PROTEIN: CPT | Performed by: INTERNAL MEDICINE

## 2022-10-02 PROCEDURE — 25010000002 ENOXAPARIN PER 10 MG: Performed by: INTERNAL MEDICINE

## 2022-10-02 PROCEDURE — 25010000002 ERTAPENEM PER 500 MG: Performed by: INTERNAL MEDICINE

## 2022-10-02 RX ADMIN — Medication 10 ML: at 08:37

## 2022-10-02 RX ADMIN — OLANZAPINE 5 MG: 5 TABLET ORAL at 21:27

## 2022-10-02 RX ADMIN — LEVOTHYROXINE SODIUM 125 MCG: 0.12 TABLET ORAL at 08:33

## 2022-10-02 RX ADMIN — BUDESONIDE AND FORMOTEROL FUMARATE DIHYDRATE 2 PUFF: 160; 4.5 AEROSOL RESPIRATORY (INHALATION) at 20:24

## 2022-10-02 RX ADMIN — ERTAPENEM SODIUM 1 G: 1 INJECTION, POWDER, LYOPHILIZED, FOR SOLUTION INTRAMUSCULAR; INTRAVENOUS at 14:36

## 2022-10-02 RX ADMIN — OLANZAPINE 5 MG: 5 TABLET ORAL at 08:33

## 2022-10-02 RX ADMIN — DEXAMETHASONE 3 MG: 6 TABLET ORAL at 08:33

## 2022-10-02 RX ADMIN — Medication 10 ML: at 21:27

## 2022-10-02 RX ADMIN — ATORVASTATIN CALCIUM 20 MG: 20 TABLET, FILM COATED ORAL at 08:33

## 2022-10-02 RX ADMIN — LETROZOLE 2.5 MG: 2.5 TABLET, FILM COATED ORAL at 08:33

## 2022-10-02 RX ADMIN — ENOXAPARIN SODIUM 40 MG: 100 INJECTION SUBCUTANEOUS at 14:36

## 2022-10-02 RX ADMIN — DONEPEZIL HYDROCHLORIDE 5 MG: 5 TABLET, FILM COATED ORAL at 08:33

## 2022-10-02 NOTE — PROGRESS NOTES
Name: Kennedi Cardenas ADMIT: 2022   : 1940  PCP: Duncan Garcia MD    MRN: [id number] LOS: 7 days   AGE/SEX: 82 y.o. female  ROOM: [facility]   Subjective   Chief Complaint   Patient presents with   • Weakness - Generalized       Dyspnea better  On oxygen   On steroids  On abx  Some increase in O2 requirement though she feels better    ROS  No f/c  No n/v  No cp/palp  +soa/cough    Objective   Vital Signs  Temp:  [96.6 °F (35.9 °C)-97.6 °F (36.4 °C)] 97.2 °F (36.2 °C)  Heart Rate:  [64-87] 78  Resp:  [16-20] 18  BP: (114-131)/(67-75) 114/67  SpO2:  [88 %-100 %] 96 %  on  Flow (L/min):  [3-6] 3;   Device (Oxygen Therapy): nasal cannula  Body mass index is 25.09 kg/m².    Physical Exam  Constitutional:       General: She is not in acute distress.     Appearance: She is ill-appearing.   HENT:      Head: Normocephalic and atraumatic.   Eyes:      General: No scleral icterus.  Cardiovascular:      Rate and Rhythm: Regular rhythm.      Heart sounds: Normal heart sounds.   Pulmonary:      Effort: Respiratory distress (slight) present.      Breath sounds: No wheezing.   Abdominal:      General: There is no distension.      Palpations: Abdomen is soft.   Musculoskeletal:      Cervical back: Neck supple.   Skin:     Coloration: Skin is pale.   Neurological:      Mental Status: She is alert.   Psychiatric:         Behavior: Behavior normal.     elderly  Chronically ill  Poor memory  Still requiring oxygen though she looks more comfortable  Pleasant and appropriate    Results Review:       I reviewed the patient's new clinical results.  Results from last 7 days   Lab Units 22  0722 22  0659 22  0810 22  1516   WBC 10*3/mm3 7.89 7.81 13.37* 15.53*   HEMOGLOBIN g/dL 10.4* 9.6* 8.9* 10.4*   PLATELETS 10*3/mm3 611* 545* 306 300     Results from last 7 days   Lab Units 10/02/22  0413 22  0722 22  0659 22  0606 22  1600 22  0810   SODIUM mmol/L 139 141 140  --   --   137   POTASSIUM mmol/L 4.0 4.1 4.1  --   --  4.3   CHLORIDE mmol/L 101 102 102  --   --  101   CO2 mmol/L 32.0* 29.6* 31.2*  --   --  28.2   BUN mg/dL 13 12 11  --   --  7*   CREATININE mg/dL 0.50* 0.50* 0.48* 0.56*   < > 0.53*   GLUCOSE mg/dL 93 80 90  --   --  97    < > = values in this interval not displayed.   Estimated Creatinine Clearance: 82.6 mL/min (A) (by C-G formula based on SCr of 0.5 mg/dL (L)).  Results from last 7 days   Lab Units 09/30/22  0722 09/29/22  0659 09/28/22  0606 09/27/22  0635   ALBUMIN g/dL 3.20* 2.90* 3.40* 3.20*   BILIRUBIN mg/dL 0.2 0.2 0.2 0.2   ALK PHOS U/L 81 82 99 108   AST (SGOT) U/L 28 26 35* 28   ALT (SGPT) U/L 23 20 22 19     Results from last 7 days   Lab Units 10/02/22  0413 09/30/22  0722 09/29/22  0659 09/28/22  0606 09/27/22  0635 09/26/22  1600 09/26/22  0810   CALCIUM mg/dL 8.5* 8.8 8.3*  --   --   --  8.3*   ALBUMIN g/dL  --  3.20* 2.90* 3.40* 3.20*   < > 3.10*    < > = values in this interval not displayed.     Results from last 7 days   Lab Units 09/25/22 1939   PROCALCITONIN ng/mL 0.14   LACTATE mmol/L 1.0       Coag     HbA1C   Lab Results   Component Value Date    HGBA1C 5.5 02/17/2016    HGBA1C 5.9 (H) 10/14/2015    HGBA1C 5.8 (H) 04/15/2015     Infection   Results from last 7 days   Lab Units 09/25/22 1939 09/25/22  1703   BLOODCX  No growth at 5 days  No growth at 5 days  --    URINECX   --  >100,000 CFU/mL Escherichia coli ESBL*   PROCALCITONIN ng/mL 0.14  --      Radiology(recent) No radiology results for the last day  No results found for: TROPONINT, TROPONINI, BNP  No components found for: TSH;2    atorvastatin, 20 mg, Oral, Daily  budesonide-formoterol, 2 puff, Inhalation, BID - RT  dexamethasone, 3 mg, Oral, Daily With Breakfast   Or  dexamethasone, 3 mg, Intravenous, Daily With Breakfast  donepezil, 5 mg, Oral, Daily  enoxaparin, 40 mg, Subcutaneous, Q24H  ertapenem, 1 g, Intravenous, Q24H  letrozole, 2.5 mg, Oral, Daily  levothyroxine, 125 mcg, Oral,  Q AM  OLANZapine, 5 mg, Oral, BID  sodium chloride, 10 mL, Intravenous, Q12H       Diet Regular; Cardiac      Assessment & Plan      Active Hospital Problems    Diagnosis  POA   • Community acquired pneumonia of right upper lobe of lung [J18.9]  Yes   • UTI (urinary tract infection) [N39.0]  Yes   • COVID-19 virus infection [U07.1]  Yes   • Sepsis (HCC) [A41.9]  Yes   • Vascular dementia without behavioral disturbance (HCC) [F01.50]  Yes   • Dementia without behavioral disturbance (HCC) [F03.90]  Yes   • Tobacco abuse [Z72.0]  Yes   • Hypothyroidism [E03.9]  Yes   • Essential hypertension [I10]  Yes   • HLD (hyperlipidemia) [E78.5]  Yes   • Vitamin D deficiency [E55.9]  Yes      Resolved Hospital Problems   No resolved problems to display.         · COVID PNA with AHRF: . Respiratory status continues to improve with remdesivir and dexamethasone.  D-dimer was elevated but no clot on CTA.  Duplex negative.  Pulmonology evaulated.  · UTI: E. Coli. ESBL on Cx.  Infectious disease input appreciated.  Continue ertapenem.  · Lung Cancer: Discussed with Oncology. They are going to follow up with her outpatient in clinic.  · Hypothyroidism: Synthroid  · Vascular Dementia with metabolic encephalopathy 2/2 covid and UTI: Schedule zyprexa nightly. Out of restraints  · HTN: Monitor  · HLD: Statin  · PPx: Lovenox  · Disposition: TBD      Greater than 38 minutes spent with greater than 50% counseling and coordinating care  Reviewed records    She is doing reasonably and feeling and looking better but still requiring oxygen. Continue to monitor    Mor Chow MD  [facility]  10/02/22  10:48 EDT   [facility]

## 2022-10-03 PROCEDURE — 94799 UNLISTED PULMONARY SVC/PX: CPT

## 2022-10-03 PROCEDURE — 97530 THERAPEUTIC ACTIVITIES: CPT

## 2022-10-03 PROCEDURE — 25010000002 ENOXAPARIN PER 10 MG: Performed by: INTERNAL MEDICINE

## 2022-10-03 PROCEDURE — 94761 N-INVAS EAR/PLS OXIMETRY MLT: CPT

## 2022-10-03 PROCEDURE — 94664 DEMO&/EVAL PT USE INHALER: CPT

## 2022-10-03 RX ADMIN — LEVOTHYROXINE SODIUM 125 MCG: 0.12 TABLET ORAL at 06:14

## 2022-10-03 RX ADMIN — Medication 10 ML: at 21:20

## 2022-10-03 RX ADMIN — LETROZOLE 2.5 MG: 2.5 TABLET, FILM COATED ORAL at 08:33

## 2022-10-03 RX ADMIN — OLANZAPINE 5 MG: 5 TABLET ORAL at 08:33

## 2022-10-03 RX ADMIN — ATORVASTATIN CALCIUM 20 MG: 20 TABLET, FILM COATED ORAL at 08:33

## 2022-10-03 RX ADMIN — OLANZAPINE 5 MG: 5 TABLET ORAL at 21:19

## 2022-10-03 RX ADMIN — DONEPEZIL HYDROCHLORIDE 5 MG: 5 TABLET, FILM COATED ORAL at 08:33

## 2022-10-03 RX ADMIN — DEXAMETHASONE 3 MG: 6 TABLET ORAL at 08:33

## 2022-10-03 RX ADMIN — Medication 10 ML: at 08:34

## 2022-10-03 RX ADMIN — BUDESONIDE AND FORMOTEROL FUMARATE DIHYDRATE 2 PUFF: 160; 4.5 AEROSOL RESPIRATORY (INHALATION) at 10:01

## 2022-10-03 RX ADMIN — BUDESONIDE AND FORMOTEROL FUMARATE DIHYDRATE 2 PUFF: 160; 4.5 AEROSOL RESPIRATORY (INHALATION) at 20:59

## 2022-10-03 RX ADMIN — ENOXAPARIN SODIUM 40 MG: 100 INJECTION SUBCUTANEOUS at 16:16

## 2022-10-03 RX ADMIN — ACETAMINOPHEN 650 MG: 325 TABLET, FILM COATED ORAL at 21:19

## 2022-10-03 NOTE — THERAPY TREATMENT NOTE
Patient Name: Kennedi Cardenas  : 1940    MRN: [id number]                              Today's Date: 10/3/2022       Admit Date: 2022    Visit Dx:     ICD-10-CM ICD-9-CM   1. Community acquired pneumonia of right upper lobe of lung  J18.9 486   2. NSCLC of left lung (HCC)  C34.92 162.9   3. Hypoxia  R09.02 799.02   4. Weakness  R53.1 780.79     Patient Active Problem List   Diagnosis   • HLD (hyperlipidemia)   • Essential hypertension   • Acquired hypothyroidism   • Osteopenia   • Vitamin D deficiency   • Prediabetes   • Left displaced femoral neck fracture (HCC)   • Hypertension   • Hypothyroidism   • Impaired glucose tolerance   • Hypoxia   • Tobacco abuse   • Recurrent falls   • Dementia without behavioral disturbance (HCC)   • Panlobular emphysema (HCC)   • Suspicious spiculated left lower lobe lung mass worrisome for primary lung cancer   • Acute respiratory failure with hypoxia (HCC)   • Traumatic rhabdomyolysis, initial encounter (HCC)   • Vascular dementia without behavioral disturbance (HCC)   • Brief psychotic disorder (HCC)   • Small vessel disease, cerebrovascular severe   • Cerebral atrophy (HCC)   • Community acquired pneumonia of right upper lobe of lung   • UTI (urinary tract infection)   • COVID-19 virus infection   • Sepsis (HCC)     Past Medical History:   Diagnosis Date   • Breast cancer (HCC)     Bilateral   • Cancer (HCC)    • Cataracts, bilateral    • Hyperlipidemia    • Hypertension    • Hypothyroidism    • Macular degeneration    • Skin cancer     Left axilla, right leg, right cheek   • Uterine prolapse      Past Surgical History:   Procedure Laterality Date   • BREAST BIOPSY Left     Benign   • BREAST LUMPECTOMY Bilateral     Dr. Lily Stevens   • EYE SURGERY     • SKIN CANCER EXCISION      Right cheek, left axilla, right leg   • TOTAL HIP ARTHROPLASTY Left 2019    Procedure: TOTAL HIP ARTHROPLASTY ANTERIOR;  Surgeon: Magdalena De Los Santos MD;  Location: [facility]  MAIN OR;  Service: Orthopedics      General Information     Row Name 10/03/22 1526          Physical Therapy Time and Intention    Document Type therapy note (daily note)  -DB     Mode of Treatment individual therapy;physical therapy  -DB     Row Name 10/03/22 1526          General Information    Patient Profile Reviewed yes  -DB     Existing Precautions/Restrictions fall;oxygen therapy device and L/min  -DB           User Key  (r) = Recorded By, (t) = Taken By, (c) = Cosigned By    Initials Name Provider Type    DB Rianna Pascual PT Physical Therapist               Mobility     Row Name 10/03/22 1526          Bed Mobility    Bed Mobility sit-supine;rolling left;rolling right  -DB     Rolling Left [place] (Bed Mobility) minimum assist (75% patient effort)  -DB     Rolling Right [place] (Bed Mobility) minimum assist (75% patient effort)  -DB     Sit-Supine [place] (Bed Mobility) standby assist;verbal cues  -DB     Assistive Device (Bed Mobility) bed rails;head of bed elevated  -DB     Row Name 10/03/22 1526          Sit-Stand Transfer    Sit-Stand [place] (Transfers) minimum assist (75% patient effort);verbal cues  -DB     Assistive Device (Sit-Stand Transfers) walker, front-wheeled  -DB     Comment, (Sit-Stand Transfer) STS 5x with Dain x1  -DB     Row Name 10/03/22 1526          Gait/Stairs (Locomotion)    [place] Level (Gait) minimum assist (75% patient effort);verbal cues  -DB     Assistive Device (Gait) walker, front-wheeled  -DB     Distance in Feet (Gait) side steps at EOB (6') x 3 with seated rest break  -DB     Deviations/Abnormal Patterns (Gait) candis decreased;stride length decreased  -DB     Bilateral Gait Deviations forward flexed posture  -DB           User Key  (r) = Recorded By, (t) = Taken By, (c) = Cosigned By    Initials Name Provider Type    DB Rianna Pascual PT Physical Therapist               Obj/Interventions     Row Name 10/03/22 1527          Motor Skills     Therapeutic Exercise other (see comments)  seated LE ther ex x 10  -DB     Row Name 10/03/22 1527          Balance    Balance Assessment sitting static balance;sitting dynamic balance;standing static balance;standing dynamic balance  -DB     Static Sitting Balance standby assist  -DB     Dynamic Sitting Balance standby assist  -DB     Position, Sitting Balance unsupported;sitting edge of bed  -DB     Static Standing Balance contact guard  -DB     Dynamic Standing Balance minimal assist  -DB     Position/Device Used, Standing Balance supported;walker, front-wheeled  -DB     Balance Interventions sitting;standing;sit to stand  -DB           User Key  (r) = Recorded By, (t) = Taken By, (c) = Cosigned By    Initials Name Provider Type    DB Rianna Pascual, PT Physical Therapist               Goals/Plan    No documentation.                Clinical Impression     Row Name 10/03/22 1528          Pain    Pain Intervention(s) Ambulation/increased activity;Repositioned  -DB     Row Name 10/03/22 1528          Plan of Care Review    Plan of Care Reviewed With patient  -DB     Outcome Evaluation Pt was seated EOB at start of session. Assist provided to todd dorado and brief. Pt educated on using call light for assistance. Bed alarm not on at start of PT session. Pt was able to work with PT this date, performs STS to RW with Dain and side steps EOB with Dain and VC's. Seated rest break in between reps. Upon returning to supine position, pt's SpO2 below 80% on 3L O2. Inc'd to 4L, pt able to recover to 90%, but needed 5 min. Attempted to call RN, phone not working. Returned to 3L once pt's SpO2 was > 90%. Bed alarm was turned back on at the end of session.  -DB     Row Name 10/03/22 1528          Vital Signs    O2 Delivery Pre Treatment room air  -DB     O2 Delivery Intra Treatment room air  -DB     Post SpO2 (%) 90  -DB     O2 Delivery Post Treatment supplemental O2  -DB     Pre Patient Position Sitting  -DB     Intra Patient  Position Standing  -DB     Post Patient Position Supine  -DB     Row Name 10/03/22 1528          Positioning and Restraints    Pre-Treatment Position in bed  -DB     Post Treatment Position bed  -DB     In Bed supine;call light within reach;encouraged to call for assist;exit alarm on;with other staff  -DB           User Key  (r) = Recorded By, (t) = Taken By, (c) = Cosigned By    Initials Name Provider Type    Rianna Grissom PT Physical Therapist               Outcome Measures     Row Name 10/03/22 1532 10/03/22 0830       How much help from another person do you currently need...    Turning from your back to your side while in flat bed without using bedrails? 3  -DB 1  -CH    Moving from lying on back to sitting on the side of a flat bed without bedrails? 3  -DB 1  -CH    Moving to and from a bed to a chair (including a wheelchair)? 3  -DB 1  -CH    Standing up from a chair using your arms (e.g., wheelchair, bedside chair)? 3  -DB 1  -CH    Climbing 3-5 steps with a railing? 2  -DB 1  -CH    To walk in hospital room? 2  -DB 1  -CH    AM-PAC 6 Clicks Score (PT) 16  -DB 6  -CH    Highest level of mobility 5 --> Static standing  -DB 2 --> Bed activities/dependent transfer  -CH    Row Name 10/03/22 1532          Functional Assessment    Outcome Measure Options AM-PAC 6 Clicks Basic Mobility (PT)  -DB           User Key  (r) = Recorded By, (t) = Taken By, (c) = Cosigned By    Initials Name Provider Type     Sofia Kline RN Registered Nurse    Rianna Grissom, MOE Physical Therapist                             Physical Therapy Education                 Title: PT OT SLP Therapies (In Progress)     Topic: Physical Therapy (In Progress)     Point: Mobility training (In Progress)     Learning Progress Summary           Patient Acceptance, E, NR by DB at 10/3/2022 1533    Acceptance, E,D, NR by  at 9/30/2022 1245    Acceptance, E,TB,D, VU,NR by  at 9/28/2022 1648    Acceptance, E,TB, VU by CALVIN at  9/28/2022 0533    Acceptance, E,TB, NR,NL by MS at 9/26/2022 1217                   Point: Home exercise program (In Progress)     Learning Progress Summary           Patient Acceptance, E, NR by DB at 10/3/2022 1533    Acceptance, E,D, NR by  at 9/30/2022 1245    Acceptance, E,TB,D, VU,NR by  at 9/28/2022 1648    Acceptance, E,TB, VU by  at 9/28/2022 0533                   Point: Body mechanics (In Progress)     Learning Progress Summary           Patient Acceptance, E, NR by DB at 10/3/2022 1533    Acceptance, E,D, NR by  at 9/30/2022 1245    Acceptance, E,TB,D, VU,NR by  at 9/28/2022 1648    Acceptance, E,TB, VU by  at 9/28/2022 0533    Acceptance, E,TB, NR,NL by MS at 9/26/2022 1217                   Point: Precautions (In Progress)     Learning Progress Summary           Patient Acceptance, E, NR by DB at 10/3/2022 1533    Acceptance, E,D, NR by  at 9/30/2022 1245    Acceptance, E,TB,D, VU,NR by  at 9/28/2022 1648    Acceptance, E,TB, VU by  at 9/28/2022 0533    Acceptance, E,TB, NR,NL by MS at 9/26/2022 1217                               User Key     Initials Effective Dates Name Provider Type Discipline     03/07/18 -  Saniya Masters, PTA Physical Therapist Assistant PT    MS 06/16/21 -  Tiffanie Boyd PT Physical Therapist PT     06/16/21 -  Rianna Pascual PT Physical Therapist PT     06/30/22 -  Ashley Varela, RN Registered Nurse Nurse              PT Recommendation and Plan     Plan of Care Reviewed With: patient  Outcome Evaluation: Pt was seated EOB at start of session. Assist provided to don new gown and brief. Pt educated on using call light for assistance. Bed alarm not on at start of PT session. Pt was able to work with PT this date, performs STS to RW with Dain and side steps EOB with Dain and VC's. Seated rest break in between reps. Upon returning to supine position, pt's SpO2 below 80% on 3L O2. Inc'd to 4L, pt able to recover to 90%, but needed 5 min.  Attempted to call RN, phone not working. Returned to 3L once pt's SpO2 was > 90%. Bed alarm was turned back on at the end of session.     Time Calculation:    PT Charges     Row Name 10/03/22 1534             Time Calculation    Start Time 1500  -DB      Stop Time 1523  -DB      Time Calculation (min) 23 min  -DB      PT Received On 10/03/22  -DB      PT - Next Appointment 10/05/22  -DB              Time Calculation- PT    Total Timed Code Minutes- PT 23 minute(s)  -DB            User Key  (r) = Recorded By, (t) = Taken By, (c) = Cosigned By    Initials Name Provider Type    DB Rianna Pascual, PT Physical Therapist              Therapy Charges for Today     Code Description Service Date Service Provider Modifiers Qty    [id number] HC PT THERAPEUTIC ACT EA 15 MIN 10/3/2022 Rianna Pascual PT GP 2          PT G-Codes  Outcome Measure Options: AM-PAC 6 Clicks Basic Mobility (PT)  AM-PAC 6 Clicks Score (PT): 16  AM-PAC 6 Clicks Score (OT): 10    Rianna Pascual PT  10/3/2022

## 2022-10-03 NOTE — PLAN OF CARE
Goal Outcome Evaluation:  Plan of Care Reviewed With: patient, spouse        Progress: declining  Outcome Evaluation: speech garbled.  awakens but then back to sleep.  follows most commands.  Dr Armando observed pt not moving LLE to command this am.  CT head done.  neuro consulted and patient seen by Dr Saldivar.  pt went to HD this afternoon via bed and remains off unit at this time.  multiple dressing changes done this am to left knee, left foot/heel, right calf, right heel, and back incision.  wound nurse re-consulted to see re: back incision and abrasion lateral to incision.  bilateral waffle boots in use.  accumax in use.  purewick in use.  bed alarm on for safety

## 2022-10-03 NOTE — PLAN OF CARE
Goal Outcome Evaluation:  Plan of Care Reviewed With: patient        Progress: improving  Outcome Evaluation: VSS, on 3L O2, enhanced contact/droplet, falls, and latex precautions maintained, takes meds whole w/ water, purewick in place, several attempts to get OOB, reoriented and made comfortable, saline locked

## 2022-10-03 NOTE — PROGRESS NOTES
Name: Kennedi Cardenas ADMIT: 2022   : 1940  PCP: Duncan Garcia MD    MRN: [id number] LOS: 8 days   AGE/SEX: 82 y.o. female  ROOM: [facility]   Subjective   Chief Complaint   Patient presents with   • Weakness - Generalized       Dyspnea better  On oxygen - less today  On steroids    ROS  No f/c  No n/v  No cp/palp  +soa/cough    Objective   Vital Signs  Temp:  [97 °F (36.1 °C)-97.9 °F (36.6 °C)] 97.1 °F (36.2 °C)  Heart Rate:  [61-80] 76  Resp:  [16-18] 18  BP: (108-136)/(63-76) 108/63  SpO2:  [24 %-99 %] 95 %  on  Flow (L/min):  [2-3] 3;   Device (Oxygen Therapy): nasal cannula  Body mass index is 25.09 kg/m².    Physical Exam  Constitutional:       General: She is not in acute distress.     Appearance: She is ill-appearing.   HENT:      Head: Normocephalic and atraumatic.   Eyes:      General: No scleral icterus.  Cardiovascular:      Rate and Rhythm: Regular rhythm.      Heart sounds: Normal heart sounds.   Pulmonary:      Effort: No respiratory distress.      Breath sounds: No wheezing.   Abdominal:      General: There is no distension.      Palpations: Abdomen is soft.   Musculoskeletal:      Cervical back: Neck supple.   Skin:     Coloration: Skin is pale.   Neurological:      Mental Status: She is alert.   Psychiatric:         Behavior: Behavior normal.     elderly  Chronically ill  Poor memory  Pleasant and appropriate- wanting to rest this morning when I saw her    Results Review:       I reviewed the patient's new clinical results.  Results from last 7 days   Lab Units 22  0722 22  0659   WBC 10*3/mm3 7.89 7.81   HEMOGLOBIN g/dL 10.4* 9.6*   PLATELETS 10*3/mm3 611* 545*     Results from last 7 days   Lab Units 10/02/22  0413 22  0722 22  0659 22  0606   SODIUM mmol/L 139 141 140  --    POTASSIUM mmol/L 4.0 4.1 4.1  --    CHLORIDE mmol/L 101 102 102  --    CO2 mmol/L 32.0* 29.6* 31.2*  --    BUN mg/dL 13 12 11  --    CREATININE mg/dL 0.50* 0.50* 0.48* 0.56*    GLUCOSE mg/dL 93 80 90  --    Estimated Creatinine Clearance: 82.6 mL/min (A) (by C-G formula based on SCr of 0.5 mg/dL (L)).  Results from last 7 days   Lab Units 09/30/22  0722 09/29/22  0659 09/28/22  0606 09/27/22  0635   ALBUMIN g/dL 3.20* 2.90* 3.40* 3.20*   BILIRUBIN mg/dL 0.2 0.2 0.2 0.2   ALK PHOS U/L 81 82 99 108   AST (SGOT) U/L 28 26 35* 28   ALT (SGPT) U/L 23 20 22 19     Results from last 7 days   Lab Units 10/02/22  0413 09/30/22 0722 09/29/22  0659 09/28/22  0606 09/27/22  0635   CALCIUM mg/dL 8.5* 8.8 8.3*  --   --    ALBUMIN g/dL  --  3.20* 2.90* 3.40* 3.20*           Coag     HbA1C   Lab Results   Component Value Date    HGBA1C 5.5 02/17/2016    HGBA1C 5.9 (H) 10/14/2015    HGBA1C 5.8 (H) 04/15/2015     Infection       Radiology(recent) No radiology results for the last day  No results found for: TROPONINT, TROPONINI, BNP  No components found for: TSH;2    atorvastatin, 20 mg, Oral, Daily  budesonide-formoterol, 2 puff, Inhalation, BID - RT  dexamethasone, 3 mg, Oral, Daily With Breakfast   Or  dexamethasone, 3 mg, Intravenous, Daily With Breakfast  donepezil, 5 mg, Oral, Daily  enoxaparin, 40 mg, Subcutaneous, Q24H  letrozole, 2.5 mg, Oral, Daily  levothyroxine, 125 mcg, Oral, Q AM  OLANZapine, 5 mg, Oral, BID  sodium chloride, 10 mL, Intravenous, Q12H       Diet Regular; Cardiac      Assessment & Plan      Active Hospital Problems    Diagnosis  POA   • Community acquired pneumonia of right upper lobe of lung [J18.9]  Yes   • UTI (urinary tract infection) [N39.0]  Yes   • COVID-19 virus infection [U07.1]  Yes   • Sepsis (HCC) [A41.9]  Yes   • Vascular dementia without behavioral disturbance (HCC) [F01.50]  Yes   • Dementia without behavioral disturbance (HCC) [F03.90]  Yes   • Tobacco abuse [Z72.0]  Yes   • Hypothyroidism [E03.9]  Yes   • Essential hypertension [I10]  Yes   • HLD (hyperlipidemia) [E78.5]  Yes   • Vitamin D deficiency [E55.9]  Yes      Resolved Hospital Problems   No resolved  problems to display.         · COVID PNA with AHRF: . Respiratory status continues to improve with remdesivir and dexamethasone.  D-dimer was elevated but no clot on CTA.  Duplex negative.  Pulmonology evaulated.  · UTI: E. Coli. ESBL on Cx.  Infectious disease input appreciated. Finished ertapenem.  · Lung Cancer: Discussed with Oncology. They are going to follow up with her outpatient in clinic.  · Hypothyroidism: Synthroid  · Vascular Dementia with metabolic encephalopathy 2/2 covid and UTI: Schedule zyprexa nightly. Much better  · HTN: Monitor  · HLD: Statin  · PPx: Lovenox  · Disposition: TBD      Reviewed records    She is doing reasonably and feeling and looking better- hopefully can be discharged to subacute rehab soon- not set up yet.     Mor Chow MD  [facility]  10/03/22  10:48 EDT

## 2022-10-03 NOTE — PROGRESS NOTES
"  Infectious Diseases Progress Note    Queta Powell MD     [facility]  Los: 8 days  Patient Identification:  Name: Kennedi Cardenas  Age: 82 y.o.  Sex: female  :  1940  MRN: [id number]         Primary Care Physician: Duncan Garcia MD            Subjective: Pleasant interactive denies any complaints and feeling better.  Interval History: see consultation note    Objective:    Scheduled Meds:atorvastatin, 20 mg, Oral, Daily  budesonide-formoterol, 2 puff, Inhalation, BID - RT  dexamethasone, 3 mg, Oral, Daily With Breakfast   Or  dexamethasone, 3 mg, Intravenous, Daily With Breakfast  donepezil, 5 mg, Oral, Daily  enoxaparin, 40 mg, Subcutaneous, Q24H  letrozole, 2.5 mg, Oral, Daily  levothyroxine, 125 mcg, Oral, Q AM  OLANZapine, 5 mg, Oral, BID  sodium chloride, 10 mL, Intravenous, Q12H      Continuous Infusions:     Vital signs in last 24 hours:  Temp:  [97 °F (36.1 °C)-97.9 °F (36.6 °C)] 97.1 °F (36.2 °C)  Heart Rate:  [61-80] 76  Resp:  [16-18] 18  BP: (108-136)/(63-76) 108/63    Intake/Output:    Intake/Output Summary (Last 24 hours) at 10/3/2022 1606  Last data filed at 10/3/2022 1530  Gross per 24 hour   Intake 100 ml   Output 1500 ml   Net -1400 ml       Exam:  /63 (BP Location: Left arm, Patient Position: Lying)   Pulse 76   Temp 97.1 °F (36.2 °C)   Resp 18   Ht 162.6 cm (64\")   Wt 66.3 kg (146 lb 2.6 oz)   SpO2 95%   BMI 25.09 kg/m²   Patient is examined using the personal protective equipment as per guidelines from infection control for this particular patient as enacted.  Hand washing was performed before and after patient interaction.  General Appearance:  Much more appropriate and interactive.  Does not appear to be in any acute distress.                          Head:    Normocephalic, without obvious abnormality, atraumatic                           Eyes:    PERRL, conjunctivae/corneas clear, EOM's intact, both eyes                         Throat:   Lips, tongue, " gums normal; oral mucosa pink and moist                           Neck:   Supple, symmetrical, trachea midline, no JVD                         Lungs:    Clear to auscultation bilaterally, respirations unlabored                 Chest Wall:    No tenderness or deformity                          Heart:    regular                  Abdomen:     Obese soft and non tender                  Extremities:   Extremities normal, atraumatic, no cyanosis or edema                        Pulses:   Pulses palpable in all extremities                            Skin:   Skin is warm and dry,  no rashes or palpable lesions                  Neurologic:   Grossly non focal but not oriented       Data Review:    I reviewed the patient's new clinical results.  Results from last 7 days   Lab Units 09/30/22  0722 09/29/22  0659   WBC 10*3/mm3 7.89 7.81   HEMOGLOBIN g/dL 10.4* 9.6*   PLATELETS 10*3/mm3 611* 545*     Results from last 7 days   Lab Units 10/02/22  0413 09/30/22  0722 09/29/22  0659 09/28/22  0606 09/27/22  0635   SODIUM mmol/L 139 141 140  --   --    POTASSIUM mmol/L 4.0 4.1 4.1  --   --    CHLORIDE mmol/L 101 102 102  --   --    CO2 mmol/L 32.0* 29.6* 31.2*  --   --    BUN mg/dL 13 12 11  --   --    CREATININE mg/dL 0.50* 0.50* 0.48* 0.56* 0.50*   CALCIUM mg/dL 8.5* 8.8 8.3*  --   --    GLUCOSE mg/dL 93 80 90  --   --        Microbiology Results (last 10 days)     Procedure Component Value - Date/Time    Blood Culture - Blood, Arm, Left [[id number]]  (Normal) Collected: 09/25/22 1939    Lab Status: Final result Specimen: Blood from Arm, Left Updated: 09/30/22 1947     Blood Culture No growth at 5 days    Blood Culture - Blood, Arm, Right [[id number]]  (Normal) Collected: 09/25/22 1939    Lab Status: Final result Specimen: Blood from Arm, Right Updated: 09/30/22 1947     Blood Culture No growth at 5 days    Respiratory Panel PCR w/COVID-19(SARS-CoV-2) KHANG/NOAM/DAKOTA/PAD/COR/MAD/VAISHNAVI In-House, NP Swab in UTM/[facility], 3-4 HR TAT - Swab,  Nasopharynx [[id number]]  (Abnormal) Collected: 09/25/22 1922    Lab Status: Final result Specimen: Swab from Nasopharynx Updated: 09/25/22 2047     ADENOVIRUS, PCR Not Detected     Coronavirus 229E Not Detected     Coronavirus HKU1 Not Detected     Coronavirus NL63 Not Detected     Coronavirus OC43 Not Detected     COVID19 Detected     Human Metapneumovirus Not Detected     Human Rhinovirus/Enterovirus Not Detected     Influenza A PCR Not Detected     Influenza B PCR Not Detected     Parainfluenza Virus 1 Not Detected     Parainfluenza Virus 2 Not Detected     Parainfluenza Virus 3 Not Detected     Parainfluenza Virus 4 Not Detected     RSV, PCR Not Detected     Bordetella pertussis pcr Not Detected     Bordetella parapertussis PCR Not Detected     Chlamydophila pneumoniae PCR Not Detected     Mycoplasma pneumo by PCR Not Detected    Narrative:      In the setting of a positive respiratory panel with a viral infection PLUS a negative procalcitonin without other underlying concern for bacterial infection, consider observing off antibiotics or discontinuation of antibiotics and continue supportive care. If the respiratory panel is positive for atypical bacterial infection (Bordetella pertussis, Chlamydophila pneumoniae, or Mycoplasma pneumoniae), consider antibiotic de-escalation to target atypical bacterial infection.    Urine Culture - Urine, Urine, Clean Catch [[id number]]  (Abnormal)  (Susceptibility) Collected: 09/25/22 1703    Lab Status: Final result Specimen: Urine, Clean Catch Updated: 09/28/22 1140     Urine Culture >100,000 CFU/mL Escherichia coli ESBL     Comment: Consider infectious disease consult.  Susceptibility results may not correlate to clinical outcomes.       Narrative:      Colonization of the urinary tract without infection is common. Treatment is discouraged unless the patient is symptomatic, pregnant, or undergoing an invasive urologic procedure.  Recent outcomes data supports the use of  pip/tazo in the treatment of susceptible ESBL infections for uncomplicated UTI. Consider use of pip/tazo as a carbapenem-sparing regimen in applicable patients.    Susceptibility      Escherichia coli ESBL      BERNADETTE      Amikacin Susceptible      Ertapenem Susceptible      Gentamicin Resistant     Levofloxacin Resistant     Meropenem Susceptible      Nitrofurantoin Susceptible      Piperacillin + Tazobactam Susceptible      Tobramycin Resistant     Trimethoprim + Sulfamethoxazole Susceptible                                 Assessment:    HLD (hyperlipidemia)    Essential hypertension    Vitamin D deficiency    Hypothyroidism    Tobacco abuse    Dementia without behavioral disturbance (HCC)    Vascular dementia without behavioral disturbance (HCC)    Community acquired pneumonia of right upper lobe of lung    UTI (urinary tract infection)    COVID-19 virus infection    Sepsis (HCC)  1-systemic illness with metabolic encephalopathy secondary to  2-Combination of factors:              -Encephalopathy secondary to untreated urinary tract infection now on proper antibiotic therapy              -Hypoxic respiratory failure due to COVID-19 infection with exacerbation of underlying lung disease                       -Exacerbation of underlying dementia diminished reserve with above metabolic stress.  3-recent diagnosis of lung cancer  4-other diagnosis per primary team.     Recommendations/Discussions:  Continue supportive care and observe off of antibiotic therapy   See my recommendation and discussion on 09/29/2022  Queta Powell MD  10/3/2022  16:06 EDT    Much of this encounter note is an electronic transcription/translation of spoken language to printed text. The electronic translation of spoken language may permit erroneous, or at times, nonsensical words or phrases to be inadvertently transcribed; Although I have reviewed the note for such errors, some may still exist

## 2022-10-03 NOTE — PLAN OF CARE
Goal Outcome Evaluation:  Plan of Care Reviewed With: patient           Outcome Evaluation: Pt was seated EOB at start of session. Assist provided to don new gown and brief. Pt educated on using call light for assistance. Bed alarm not on at start of PT session. Pt was able to work with PT this date, performs STS to RW with Dain and side steps EOB with Dain and VC's. Seated rest break in between reps. Upon returning to supine position, pt's SpO2 below 80% on 3L O2. Inc'd to 4L, pt able to recover to 90%, but needed 5 min. Attempted to call RN, phone not working. Returned to 3L once pt's SpO2 was > 90%. Bed alarm was turned back on at the end of session.

## 2022-10-03 NOTE — PLAN OF CARE
Goal Outcome Evaluation:  Plan of Care Reviewed With: patient        Progress: no change  Outcome Evaluation: sleepy, irritable and combative  this am.  much more alert this afternoon, pleasant and cooperative.  oxygen at 3LPM.  attempted to wean oxygen but patient desats to 80's.  respirations unlabored.  occasional congested, nonproductive cough.   bed alarm in use for safety.  purewick in use.  pt worked with PT today.  elbows pink/red and elbow protectors placed.   accumax in use.  mepilex intact to head, LFA, and RFA.  left hip bruised.  enhanced contact isolation precautions observed.

## 2022-10-03 NOTE — PROGRESS NOTES
"  Infectious Diseases Progress Note    Queta Powell MD     [facility]  Los: 7 days  Patient Identification:  Name: Kennedi Cardenas  Age: 82 y.o.  Sex: female  :  1940  MRN: [id number]         Primary Care Physician: Duncan Garcia MD            Subjective: Not as confused.  Interval History: see consultation note    Objective:    Scheduled Meds:atorvastatin, 20 mg, Oral, Daily  budesonide-formoterol, 2 puff, Inhalation, BID - RT  dexamethasone, 3 mg, Oral, Daily With Breakfast   Or  dexamethasone, 3 mg, Intravenous, Daily With Breakfast  donepezil, 5 mg, Oral, Daily  enoxaparin, 40 mg, Subcutaneous, Q24H  letrozole, 2.5 mg, Oral, Daily  levothyroxine, 125 mcg, Oral, Q AM  OLANZapine, 5 mg, Oral, BID  sodium chloride, 10 mL, Intravenous, Q12H      Continuous Infusions:     Vital signs in last 24 hours:  Temp:  [96.6 °F (35.9 °C)-97.2 °F (36.2 °C)] 97 °F (36.1 °C)  Heart Rate:  [64-80] 80  Resp:  [16-20] 16  BP: (114-130)/(67-85) 123/74    Intake/Output:    Intake/Output Summary (Last 24 hours) at 10/2/2022 2124  Last data filed at 10/2/2022 1715  Gross per 24 hour   Intake --   Output 300 ml   Net -300 ml       Exam:  /74 (BP Location: Left arm, Patient Position: Lying)   Pulse 80   Temp 97 °F (36.1 °C) (Oral)   Resp 16   Ht 162.6 cm (64\")   Wt 66.3 kg (146 lb 2.6 oz)   SpO2 (!) 24%   BMI 25.09 kg/m²   Patient is examined using the personal protective equipment as per guidelines from infection control for this particular patient as enacted.  Hand washing was performed before and after patient interaction.  General Appearance:  Much more appropriate and interactive.  Does not appear to be in any acute distress.                          Head:    Normocephalic, without obvious abnormality, atraumatic                           Eyes:    PERRL, conjunctivae/corneas clear, EOM's intact, both eyes                         Throat:   Lips, tongue, gums normal; oral mucosa pink and moist      "                      Neck:   Supple, symmetrical, trachea midline, no JVD                         Lungs:    Clear to auscultation bilaterally, respirations unlabored                 Chest Wall:    No tenderness or deformity                          Heart:    regular                  Abdomen:     Obese soft and non tender                  Extremities:   Extremities normal, atraumatic, no cyanosis or edema                        Pulses:   Pulses palpable in all extremities                            Skin:   Skin is warm and dry,  no rashes or palpable lesions                  Neurologic:   Grossly non focal but not oriented       Data Review:    I reviewed the patient's new clinical results.  Results from last 7 days   Lab Units 09/30/22  0722 09/29/22  0659 09/26/22  0810   WBC 10*3/mm3 7.89 7.81 13.37*   HEMOGLOBIN g/dL 10.4* 9.6* 8.9*   PLATELETS 10*3/mm3 611* 545* 306     Results from last 7 days   Lab Units 10/02/22  0413 09/30/22  0722 09/29/22  0659 09/28/22  0606 09/27/22  0635 09/26/22  1600 09/26/22  0810   SODIUM mmol/L 139 141 140  --   --   --  137   POTASSIUM mmol/L 4.0 4.1 4.1  --   --   --  4.3   CHLORIDE mmol/L 101 102 102  --   --   --  101   CO2 mmol/L 32.0* 29.6* 31.2*  --   --   --  28.2   BUN mg/dL 13 12 11  --   --   --  7*   CREATININE mg/dL 0.50* 0.50* 0.48* 0.56* 0.50* 0.56* 0.53*   CALCIUM mg/dL 8.5* 8.8 8.3*  --   --   --  8.3*   GLUCOSE mg/dL 93 80 90  --   --   --  97       Microbiology Results (last 10 days)     Procedure Component Value - Date/Time    Blood Culture - Blood, Arm, Left [[id number]]  (Normal) Collected: 09/25/22 1939    Lab Status: Final result Specimen: Blood from Arm, Left Updated: 09/30/22 1947     Blood Culture No growth at 5 days    Blood Culture - Blood, Arm, Right [[id number]]  (Normal) Collected: 09/25/22 1939    Lab Status: Final result Specimen: Blood from Arm, Right Updated: 09/30/22 1947     Blood Culture No growth at 5 days    Respiratory Panel PCR  w/COVID-19(SARS-CoV-2) KHANG/NOAM/DAKOTA/PAD/COR/MAD/VAISHNAVI In-House, NP Swab in UTM/VTM, 3-4 HR TAT - Swab, Nasopharynx [[id number]]  (Abnormal) Collected: 09/25/22 1922    Lab Status: Final result Specimen: Swab from Nasopharynx Updated: 09/25/22 2047     ADENOVIRUS, PCR Not Detected     Coronavirus 229E Not Detected     Coronavirus HKU1 Not Detected     Coronavirus NL63 Not Detected     Coronavirus OC43 Not Detected     COVID19 Detected     Human Metapneumovirus Not Detected     Human Rhinovirus/Enterovirus Not Detected     Influenza A PCR Not Detected     Influenza B PCR Not Detected     Parainfluenza Virus 1 Not Detected     Parainfluenza Virus 2 Not Detected     Parainfluenza Virus 3 Not Detected     Parainfluenza Virus 4 Not Detected     RSV, PCR Not Detected     Bordetella pertussis pcr Not Detected     Bordetella parapertussis PCR Not Detected     Chlamydophila pneumoniae PCR Not Detected     Mycoplasma pneumo by PCR Not Detected    Narrative:      In the setting of a positive respiratory panel with a viral infection PLUS a negative procalcitonin without other underlying concern for bacterial infection, consider observing off antibiotics or discontinuation of antibiotics and continue supportive care. If the respiratory panel is positive for atypical bacterial infection (Bordetella pertussis, Chlamydophila pneumoniae, or Mycoplasma pneumoniae), consider antibiotic de-escalation to target atypical bacterial infection.    Urine Culture - Urine, Urine, Clean Catch [[id number]]  (Abnormal)  (Susceptibility) Collected: 09/25/22 1703    Lab Status: Final result Specimen: Urine, Clean Catch Updated: 09/28/22 1140     Urine Culture >100,000 CFU/mL Escherichia coli ESBL     Comment: Consider infectious disease consult.  Susceptibility results may not correlate to clinical outcomes.       Narrative:      Colonization of the urinary tract without infection is common. Treatment is discouraged unless the patient is symptomatic,  pregnant, or undergoing an invasive urologic procedure.  Recent outcomes data supports the use of pip/tazo in the treatment of susceptible ESBL infections for uncomplicated UTI. Consider use of pip/tazo as a carbapenem-sparing regimen in applicable patients.    Susceptibility      Escherichia coli ESBL      BERNADETTE      Amikacin Susceptible      Ertapenem Susceptible      Gentamicin Resistant     Levofloxacin Resistant     Meropenem Susceptible      Nitrofurantoin Susceptible      Piperacillin + Tazobactam Susceptible      Tobramycin Resistant     Trimethoprim + Sulfamethoxazole Susceptible                                 Assessment:    HLD (hyperlipidemia)    Essential hypertension    Vitamin D deficiency    Hypothyroidism    Tobacco abuse    Dementia without behavioral disturbance (HCC)    Vascular dementia without behavioral disturbance (HCC)    Community acquired pneumonia of right upper lobe of lung    UTI (urinary tract infection)    COVID-19 virus infection    Sepsis (HCC)  1-systemic illness with metabolic encephalopathy secondary to  2-Combination of factors:              -Encephalopathy secondary to untreated urinary tract infection now on proper antibiotic therapy              -Hypoxic respiratory failure due to COVID-19 infection with exacerbation of underlying lung disease                       -Exacerbation of underlying dementia diminished reserve with above metabolic stress.  3-recent diagnosis of lung cancer  4-other diagnosis per primary team.     Recommendations/Discussions:  Continue supportive care and observe off of antibiotic therapy which she completed earlier today.  See my recommendation and discussion on 09/29/2022  Queta Powell MD  10/2/2022  21:24 EDT    Much of this encounter note is an electronic transcription/translation of spoken language to printed text. The electronic translation of spoken language may permit erroneous, or at times, nonsensical words or phrases to be inadvertently  transcribed; Although I have reviewed the note for such errors, some may still exist

## 2022-10-04 VITALS
WEIGHT: 146.16 LBS | DIASTOLIC BLOOD PRESSURE: 75 MMHG | HEIGHT: 64 IN | RESPIRATION RATE: 16 BRPM | TEMPERATURE: 97.2 F | BODY MASS INDEX: 24.95 KG/M2 | OXYGEN SATURATION: 90 % | SYSTOLIC BLOOD PRESSURE: 130 MMHG | HEART RATE: 80 BPM

## 2022-10-04 PROBLEM — D89.832 CYTOKINE RELEASE SYNDROME, GRADE 2: Status: ACTIVE | Noted: 2022-10-04

## 2022-10-04 LAB — PLATELET # BLD AUTO: 558 10*3/MM3 (ref 140–450)

## 2022-10-04 PROCEDURE — G0008 ADMIN INFLUENZA VIRUS VAC: HCPCS | Performed by: HOSPITALIST

## 2022-10-04 PROCEDURE — 25010000002 INFLUENZA VAC HIGH-DOSE QUAD 0.7 ML SUSPENSION PREFILLED SYRINGE: Performed by: HOSPITALIST

## 2022-10-04 PROCEDURE — 25010000002 DEXAMETHASONE PER 1 MG: Performed by: INTERNAL MEDICINE

## 2022-10-04 PROCEDURE — 90662 IIV NO PRSV INCREASED AG IM: CPT | Performed by: HOSPITALIST

## 2022-10-04 PROCEDURE — 85049 AUTOMATED PLATELET COUNT: CPT | Performed by: INTERNAL MEDICINE

## 2022-10-04 RX ORDER — OLANZAPINE 5 MG/1
5 TABLET ORAL 2 TIMES DAILY
Start: 2022-10-04

## 2022-10-04 RX ORDER — DEXAMETHASONE 1.5 MG/1
3 TABLET ORAL
Qty: 2 TABLET | Refills: 0
Start: 2022-10-05 | End: 2022-10-06

## 2022-10-04 RX ADMIN — ATORVASTATIN CALCIUM 20 MG: 20 TABLET, FILM COATED ORAL at 09:18

## 2022-10-04 RX ADMIN — LEVOTHYROXINE SODIUM 125 MCG: 0.12 TABLET ORAL at 06:01

## 2022-10-04 RX ADMIN — DEXAMETHASONE SODIUM PHOSPHATE 3 MG: 4 INJECTION, SOLUTION INTRAMUSCULAR; INTRAVENOUS at 09:17

## 2022-10-04 RX ADMIN — Medication 10 ML: at 09:18

## 2022-10-04 RX ADMIN — DONEPEZIL HYDROCHLORIDE 5 MG: 5 TABLET, FILM COATED ORAL at 09:18

## 2022-10-04 RX ADMIN — INFLUENZA A VIRUS A/VICTORIA/2570/2019 IVR-215 (H1N1) ANTIGEN (FORMALDEHYDE INACTIVATED), INFLUENZA A VIRUS A/DARWIN/9/2021 SAN-010 (H3N2) ANTIGEN (FORMALDEHYDE INACTIVATED), INFLUENZA B VIRUS B/PHUKET/3073/2013 ANTIGEN (FORMALDEHYDE INACTIVATED), AND INFLUENZA B VIRUS B/MICHIGAN/01/2021 ANTIGEN (FORMALDEHYDE INACTIVATED) 0.7 ML: 60; 60; 60; 60 INJECTION, SUSPENSION INTRAMUSCULAR at 13:35

## 2022-10-04 RX ADMIN — ACETAMINOPHEN 650 MG: 325 TABLET, FILM COATED ORAL at 11:40

## 2022-10-04 RX ADMIN — LETROZOLE 2.5 MG: 2.5 TABLET, FILM COATED ORAL at 09:18

## 2022-10-04 RX ADMIN — OLANZAPINE 5 MG: 5 TABLET ORAL at 09:18

## 2022-10-04 NOTE — PLAN OF CARE
Goal Outcome Evaluation:  Plan of Care Reviewed With: patient        Progress: no change  Outcome Evaluation: enhanced contact/droplet precautions maintained, cooperative, and pleasant this evening, disoriented to time, able to wean oxygen to 2LPM without desatting, pt has congestive nonproductive cough, c/o of hip pain-tylenol given x1, purewick in place, bed alarm in use for pt safety, turning q2h, madi regular diet, morning labs ordered, afebrile, refuses to wear scds, pills given whole with applesauce, accumax

## 2022-10-04 NOTE — CASE MANAGEMENT/SOCIAL WORK
Continued Stay Note  [facility]     Patient Name: Kennedi Cardenas  MRN: [id number]  Today's Date: 10/4/2022    Admit Date: 9/25/2022    Plan: [facility]   Discharge Plan     Row Name 10/04/22 0929       Plan    Plan Comments Spoke with Krystal from [facility], [facility] precert obtained for [facility]. Rena with [facility], accepted. [facility] scheduled for 3PM today.  Will continue to monitor for new or changing discharge needs. NORBERTO ENRIQUE [facility]    Row Name 10/04/22 0904       Plan    Plan [facility]               Discharge Codes    No documentation.               Expected Discharge Date and Time     Expected Discharge Date Expected Discharge Time    Oct 4, 2022             Alejandra Arriaga RN

## 2022-10-04 NOTE — PROGRESS NOTES
"  Infectious Diseases Progress Note    Queta Powell MD     [facility]  Los: 9 days  Patient Identification:  Name: Kennedi Cardenas  Age: 82 y.o.  Sex: female  :  1940  MRN: [id number]         Primary Care Physician: Duncan Garcia MD            Subjective: Continues to do well.  Denies any fever and chills.  No new symptoms.  More appropriate.  Interval History: see consultation note    Objective:    Scheduled Meds:atorvastatin, 20 mg, Oral, Daily  budesonide-formoterol, 2 puff, Inhalation, BID - RT  dexamethasone, 3 mg, Oral, Daily With Breakfast   Or  dexamethasone, 3 mg, Intravenous, Daily With Breakfast  donepezil, 5 mg, Oral, Daily  enoxaparin, 40 mg, Subcutaneous, Q24H  letrozole, 2.5 mg, Oral, Daily  levothyroxine, 125 mcg, Oral, Q AM  OLANZapine, 5 mg, Oral, BID  sodium chloride, 10 mL, Intravenous, Q12H      Continuous Infusions:     Vital signs in last 24 hours:  Temp:  [97 °F (36.1 °C)-97.9 °F (36.6 °C)] 97.5 °F (36.4 °C)  Heart Rate:  [56-78] 56  Resp:  [16-18] 16  BP: (108-149)/(52-83) 149/83    Intake/Output:    Intake/Output Summary (Last 24 hours) at 10/4/2022 0804  Last data filed at 10/3/2022 2318  Gross per 24 hour   Intake 220 ml   Output 850 ml   Net -630 ml       Exam:  /83 (BP Location: Left arm, Patient Position: Lying)   Pulse 56   Temp 97.5 °F (36.4 °C) (Oral)   Resp 16   Ht 162.6 cm (64\")   Wt 66.3 kg (146 lb 2.6 oz)   SpO2 96%   BMI 25.09 kg/m²   Patient is examined using the personal protective equipment as per guidelines from infection control for this particular patient as enacted.  Hand washing was performed before and after patient interaction.  General Appearance:  Much more appropriate and interactive.  Does not appear to be in any acute distress.                          Head:    Normocephalic, without obvious abnormality, atraumatic                           Eyes:    PERRL, conjunctivae/corneas clear, EOM's intact, both eyes                    "      Throat:   Lips, tongue, gums normal; oral mucosa pink and moist                           Neck:   Supple, symmetrical, trachea midline, no JVD                         Lungs:    Clear to auscultation bilaterally, respirations unlabored                 Chest Wall:    No tenderness or deformity                          Heart:    regular                  Abdomen:     Obese soft and non tender                  Extremities:   Extremities normal, atraumatic, no cyanosis or edema                        Pulses:   Pulses palpable in all extremities                            Skin:   Skin is warm and dry,  no rashes or palpable lesions                  Neurologic:   Grossly non focal but not oriented       Data Review:    I reviewed the patient's new clinical results.  Results from last 7 days   Lab Units 10/04/22  0553 09/30/22  0722 09/29/22  0659   WBC 10*3/mm3  --  7.89 7.81   HEMOGLOBIN g/dL  --  10.4* 9.6*   PLATELETS 10*3/mm3 558* 611* 545*     Results from last 7 days   Lab Units 10/02/22  0413 09/30/22  0722 09/29/22  0659 09/28/22  0606   SODIUM mmol/L 139 141 140  --    POTASSIUM mmol/L 4.0 4.1 4.1  --    CHLORIDE mmol/L 101 102 102  --    CO2 mmol/L 32.0* 29.6* 31.2*  --    BUN mg/dL 13 12 11  --    CREATININE mg/dL 0.50* 0.50* 0.48* 0.56*   CALCIUM mg/dL 8.5* 8.8 8.3*  --    GLUCOSE mg/dL 93 80 90  --        Microbiology Results (last 10 days)     Procedure Component Value - Date/Time    Blood Culture - Blood, Arm, Left [[id number]]  (Normal) Collected: 09/25/22 1939    Lab Status: Final result Specimen: Blood from Arm, Left Updated: 09/30/22 1947     Blood Culture No growth at 5 days    Blood Culture - Blood, Arm, Right [[id number]]  (Normal) Collected: 09/25/22 1939    Lab Status: Final result Specimen: Blood from Arm, Right Updated: 09/30/22 1947     Blood Culture No growth at 5 days    Respiratory Panel PCR w/COVID-19(SARS-CoV-2) [facility]AM/DAKOTA/PAD/COR/MAD/VAISHNAVI In-House, NP Swab in UTM/VTM, 3-4 HR TAT - Swab,  Nasopharynx [[id number]]  (Abnormal) Collected: 09/25/22 1922    Lab Status: Final result Specimen: Swab from Nasopharynx Updated: 09/25/22 2047     ADENOVIRUS, PCR Not Detected     Coronavirus 229E Not Detected     Coronavirus HKU1 Not Detected     Coronavirus NL63 Not Detected     Coronavirus OC43 Not Detected     COVID19 Detected     Human Metapneumovirus Not Detected     Human Rhinovirus/Enterovirus Not Detected     Influenza A PCR Not Detected     Influenza B PCR Not Detected     Parainfluenza Virus 1 Not Detected     Parainfluenza Virus 2 Not Detected     Parainfluenza Virus 3 Not Detected     Parainfluenza Virus 4 Not Detected     RSV, PCR Not Detected     Bordetella pertussis pcr Not Detected     Bordetella parapertussis PCR Not Detected     Chlamydophila pneumoniae PCR Not Detected     Mycoplasma pneumo by PCR Not Detected    Narrative:      In the setting of a positive respiratory panel with a viral infection PLUS a negative procalcitonin without other underlying concern for bacterial infection, consider observing off antibiotics or discontinuation of antibiotics and continue supportive care. If the respiratory panel is positive for atypical bacterial infection (Bordetella pertussis, Chlamydophila pneumoniae, or Mycoplasma pneumoniae), consider antibiotic de-escalation to target atypical bacterial infection.    Urine Culture - Urine, Urine, Clean Catch [[id number]]  (Abnormal)  (Susceptibility) Collected: 09/25/22 1703    Lab Status: Final result Specimen: Urine, Clean Catch Updated: 09/28/22 1140     Urine Culture >100,000 CFU/mL Escherichia coli ESBL     Comment: Consider infectious disease consult.  Susceptibility results may not correlate to clinical outcomes.       Narrative:      Colonization of the urinary tract without infection is common. Treatment is discouraged unless the patient is symptomatic, pregnant, or undergoing an invasive urologic procedure.  Recent outcomes data supports the use of  pip/tazo in the treatment of susceptible ESBL infections for uncomplicated UTI. Consider use of pip/tazo as a carbapenem-sparing regimen in applicable patients.    Susceptibility      Escherichia coli ESBL      BERNADETTE      Amikacin Susceptible      Ertapenem Susceptible      Gentamicin Resistant     Levofloxacin Resistant     Meropenem Susceptible      Nitrofurantoin Susceptible      Piperacillin + Tazobactam Susceptible      Tobramycin Resistant     Trimethoprim + Sulfamethoxazole Susceptible                                 Assessment:    HLD (hyperlipidemia)    Essential hypertension    Vitamin D deficiency    Hypothyroidism    Tobacco abuse    Dementia without behavioral disturbance (HCC)    Vascular dementia without behavioral disturbance (HCC)    Community acquired pneumonia of right upper lobe of lung    UTI (urinary tract infection)    COVID-19 virus infection    Sepsis (HCC)  1-systemic illness with metabolic encephalopathy secondary to  2-Combination of factors:              -Encephalopathy secondary to untreated urinary tract infection now on proper antibiotic therapy              -Hypoxic respiratory failure due to COVID-19 infection with exacerbation of underlying lung disease                       -Exacerbation of underlying dementia diminished reserve with above metabolic stress.  3-recent diagnosis of lung cancer  4-other diagnosis per primary team.     Recommendations/Discussions:  Continue supportive care and observe off of antibiotic therapy   See my recommendation and discussion on 09/29/2022  Queta Powell MD  10/4/2022  08:04 EDT    Much of this encounter note is an electronic transcription/translation of spoken language to printed text. The electronic translation of spoken language may permit erroneous, or at times, nonsensical words or phrases to be inadvertently transcribed; Although I have reviewed the note for such errors, some may still exist

## 2022-10-04 NOTE — PLAN OF CARE
Goal Outcome Evaluation:  Plan of Care Reviewed With: patient        Progress: improving  Outcome Evaluation: tylenol for hip pain. blanchable redness to cirilo elbows, protectors on. L knee scab and redness. bandage to top of head, cirilo forearms pt refused to let me change. flu vaccine given prior to d/c. transport scheduled for 3pm to [facility] O2 titrated to keep O2 >90% 2-5 L depending on pt movement or activity. No SOA notes. Congested NP cough.

## 2022-10-04 NOTE — CASE MANAGEMENT/SOCIAL WORK
"Physicians Statement of Medical Necessity for  Ambulance Transportation    GENERAL INFORMATION     Name: Kennedi Cardenas  YOB: 1940  Medicare #: [id number]  Transport Date: 10/4/2022 (Valid for round trips this date, or for scheduled repetitive trips for 60 days from the date signed below.)  Origin: [facility]  Destination: [facility]  Is the Patient's stay covered under Medicare Part A (PPS/DRG?)Yes  Closest appropriate facility? Yes  If this a hosp-hosp transfer? No  Is this a hospice patient? No    MEDICAL NECESSITY QUESTIONAIRE    Ambulance Transportation is medically necessary only if other means of transportation are contraindicated or would be potentially harmful to the patient.  To meet this requirement, the patient must be either \"bed confined\" or suffer from a condition such that transport by means other than an ambulance is contraindicated by the patient's condition.  The following questions must be answered by the healthcare professional signing below for this form to be valid:     1) Describe the MEDICAL CONDITION (physical and/or mental) of this patient AT THE TIME OF AMBULANCE TRANSPORT that requires the patient to be transported in an ambulance, and why transport by other means is contraindicated by the patient's condition:   Past Medical History:   Diagnosis Date   • Breast cancer (HCC)     Bilateral   • Cancer (HCC)    • Cataracts, bilateral    • Hyperlipidemia    • Hypertension    • Hypothyroidism    • Macular degeneration    • Skin cancer     Left axilla, right leg, right cheek   • Uterine prolapse       Past Surgical History:   Procedure Laterality Date   • BREAST BIOPSY Left 2006    Benign   • BREAST LUMPECTOMY Bilateral 2018    Dr. Lily Stevens   • EYE SURGERY     • SKIN CANCER EXCISION      Right cheek, left axilla, right leg   • TOTAL HIP ARTHROPLASTY Left 02/23/2019    Procedure: TOTAL HIP ARTHROPLASTY ANTERIOR;  Surgeon: Magdalena De Los Santos MD;  Location: " "[facility] MAIN OR;  Service: Orthopedics      2) Is this patient \"bed confined\" as defined below?Yes   To be \"bed confined\" the patient must satisfy all three of the following criteria:  (1) unable to get up from bed without assistance; AND (2) unable to ambulate;  AND (3) unable to sit in a chair or wheelchair.  3) Can this patient safely be transported by car or wheelchair van (I.e., may safely sit during transport, without an attendant or monitoring?)No   4. In addition to completing questions 1-3 above, please check any of the following conditions that apply*:          *Note: supporting documentation for any boxes checked must be maintained in the patient's medical records Moderate/severe pain on movement, Requires oxygen - unable to self administer and Unable to tolerate seated position for time needed to transport      SIGNATURE OF PHYSICIAN OR OTHER AUTHORIZED HEALTHCARE PROFESSIONAL    I certify that the above information is true and correct based on my evaluation of this patient, and represent that the patient requires transport by ambulance and that other forms of transport are contraindicated.  I understand that this information will be used by the Centers for Medicare and Medicaid Services (CMS) to support the determiniation of medical necessity for ambulance services, and I represent that I have personal knowledge of the patient's condition at the time of transport.    x   If this box is checked, I also certify that the patient is physically or mentally incapable of signing the ambulance service's claim form and that the institution with which I am affiliated has furnished care, services or assistance to the patient.  My signature below is made on behalf of the patient pursuant to 42 .36(b)(4). In accordance with 42 .37, the specific reason(s) that the patient is physically or mentally incapable of signing the claim for is as follows:     Signature of Physician or Healthcare Professional  " Date/Time:   10/4/2022 0922     (For Scheduled repetitive transport, this form is not valid for transports performed more than 60 days after this date).                                                                                                                                            --------------------------------------------------------------------------------------------  Printed Name and Credentials of Physician or Authorized Healthcare Professional     *Form must be signed by patient's attending physician for scheduled, repetitive transports,.  For non-repetitive ambulance transports, if unable to obtain the signature of the attending physician, any of the following may sign (please select below):     Physician  Clinical Nurse Specialist  Registered Nurse     Physician Assistant  Discharge Planner  Licensed Practical Nurse     Nurse Practitioner

## 2022-10-04 NOTE — CASE MANAGEMENT/SOCIAL WORK
Case Management Discharge Note      Final Note: Discharge to [facility] SNF- DRK RN CCP         Selected Continued Care - Admitted Since 9/25/2022     Destination Coordination complete.    Service Provider Selected Services Address Phone Fax Patient Preferred    [facility]  [facility] [address] [facility] [address] [phone] --          Durable Medical Equipment    No services have been selected for the patient.              Dialysis/Infusion    No services have been selected for the patient.              Home Medical Care    No services have been selected for the patient.              Therapy    No services have been selected for the patient.              Community Resources    No services have been selected for the patient.              Community & DME    No services have been selected for the patient.                Selected Continued Care - Prior Encounters Includes selections from prior encounters from 6/27/2022 to 10/4/2022    Discharged on 9/3/2022 Admission date: 8/24/2022 - Discharge disposition: Skilled Nursing Facility (DC - External)    Destination     Service Provider Selected Services Address Phone Fax Patient Preferred    [facility] [address] [facility] [address] [phone] --       Internal Comment last updated by Gege Perea, RN 8/31/2022 1041    .8/31/2022 cert approved .Gege Perea, KLAUS                                 Transportation Services  Ambulance: [facility]    Final Discharge Disposition Code: 03 - skilled nursing facility (SNF)

## 2022-10-04 NOTE — DISCHARGE SUMMARY
"Date of Admission: 9/25/2022  Date of Discharge:  10/4/2022  Primary Care Physician: Duncan Garcia MD     Discharge Diagnosis:  Active Hospital Problems    Diagnosis  POA   • Cytokine release syndrome, grade 2 [D89.832]  No   • Community acquired pneumonia of right upper lobe of lung [J18.9]  Yes   • UTI (urinary tract infection) [N39.0]  Yes   • COVID-19 virus infection [U07.1]  Yes   • Sepsis (HCC) [A41.9]  Yes   • Vascular dementia without behavioral disturbance (HCC) [F01.50]  Yes   • Dementia without behavioral disturbance (HCC) [F03.90]  Yes   • Tobacco abuse [Z72.0]  Yes   • Hypothyroidism [E03.9]  Yes   • Essential hypertension [I10]  Yes   • HLD (hyperlipidemia) [E78.5]  Yes   • Vitamin D deficiency [E55.9]  Yes      Resolved Hospital Problems   No resolved problems to display.       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 female with a history of HTN, HLD, Hypothyroidism, macular degeneration, lung cancer, and dementia who presented with generalized weakness. Please see admission H&P for further details. She was found to have COVID-19 pneumonia with hypoxemia. She was started on remdesivir and decadron and completed the former and will finish a course of the latter following discharge. She did not have evidence of superimposed bacterial pneumonia or thromboembolic phenomena. She did have a UTI and her urine culture grew ESBL E.coli for which she completed a course of ertapenem under the direction of infectious diseases.   She is medically stable and will be discharged to SNF. She has oncology follow up as an outpatient.     Exam Today:  Blood pressure 149/83, pulse 56, temperature 97.5 °F (36.4 °C), temperature source Oral, resp. rate 16, height 162.6 cm (64\"), weight 66.3 kg (146 lb 2.6 oz), SpO2 90 " %.  Constitutional:       General: She is not in acute distress.     Appearance: She appears chronically ill.  HENT:      Head: Normocephalic and atraumatic.      Oropharynx clear and moist  Eyes:      General: No scleral icterus.     PERRL  Cardiovascular:      Rate and Rhythm: Regular rhythm.      Heart sounds: Normal heart sounds.   Pulmonary:      Effort: No respiratory distress.      Breath sounds: No wheezing.   Abdominal:      General: There is no distension.      Palpations: Abdomen is soft.   Musculoskeletal:      Cervical back: Neck supple.   Skin:     Coloration: Skin is pale  Neurological:      Mental Status: She is alert.      No gross deficits  Psychiatric:         Behavior: Behavior normal.      Mood and affect appropriate    Procedures Performed:  CT ANGIOGRAM CHEST-9/25/22   Radiation dose reduction techniques were utilized, including automated  exposure control and exposure modulation based on body size.     Clinical: Shortness of breath and hypoxia, known lung carcinoma, rule  out PE     CT scan of the chest performed with intravenous contrast, pulmonary  embolus protocol to include coronal, sagittal and three-dimensional  reconstruction.     COMPARISON 8/24/2022     FINDINGS: Cardiac enlargement with a small pericardial effusion as  before. Small sliding-type hiatal hernia seen. There is atherosclerotic  calcification of the aorta. No pulmonary embolus.     Stellate 2.2 x 2.3 cm tumor within the left lower lobe reidentified.  There is a small wedge-shaped area of atelectasis demonstrated along the  lingular segment left upper lobe. There is an 8 mm nodular focus of new  airspace disease within the right upper lobe on image #55.     There has been interval development of bronchial wall thickening  bilaterally most pronounced in the lower lobes with some areas of mucous  plugging consistent with bronchitis. Atelectasis along the right lower  lobe costophrenic sulcus now seen. There is now bilateral  hilar  lymphadenopathy. Reference right hilar lymph node is 15 mm short access  and reference left hilar lymph node is 13 mm short axis.     CONCLUSION:  1. Left lower lobe stellate tumor reidentified size similar to the  previous examination.  2. Bilateral areas of bronchial wall thickening most pronounced in the  lower lobes consistent with bronchitis and mucous plugging. Bilateral  hilar lymphadenopathy is now demonstrated.  3. Atelectasis lingular segment.  4. Small focus of airspace disease has developed within the right upper  lobe since 2022.  5. No pulmonary embolus.    Kennedi Cardenas  Duplex scan of extremity veins including responses to compression and other maneuvers; bilateral  Order# [id number]  [place] physician: Gunnar Kan MD Ordering physician: Arcenio Kumar MD Study date: 22       Patient Information    Patient Name   Kennedi Cardenas MRN   [id number] Legal Sex   Female  (Age)   1940 (82 y.o.)     Clinical Indication    shortness of breath     Admission Information    Admission Date/Time Discharge Date/Time Room/Bed   22  02:13 PM  P677/1     Interpretation Summary    · Chronic right lower extremity superficial thrombophlebitis noted in the great saphenous (below knee).  · All other veins appeared normal bilaterally.        Consults:   Consults     Date and Time Order Name Status Description    2022 12:27 PM Inpatient Infectious Diseases Consult Completed     2022  9:52 PM Inpatient Pulmonology Consult Completed     2022  7:34 PM Inpatient Pulmonology Consult Completed     2022  6:56 PM [facility] (on-call MD unless specified) Details      2022  5:00 PM Inpatient Thoracic Surgery Consult Completed     2022  9:43 PM Inpatient Neurology Consult General Completed            Discharge Disposition:  Skilled Nursing Facility (DC - External)    Discharge Medications:     Discharge Medications      New Medications      Instructions Start Date    dexamethasone 1.5 MG tablet  Commonly known as: DECADRON   3 mg, Oral, Daily With Breakfast   Start Date: October 5, 2022     OLANZapine 5 MG tablet  Commonly known as: zyPREXA   5 mg, Oral, 2 Times Daily         Continue These Medications      Instructions Start Date   acetaminophen 325 MG tablet  Commonly known as: TYLENOL   650 mg, Oral, Every 4 Hours PRN      atorvastatin 20 MG tablet  Commonly known as: LIPITOR   20 mg, Oral, Daily      bisacodyl 5 MG EC tablet  Commonly known as: DULCOLAX   10 mg, Oral, Daily PRN      budesonide-formoterol 160-4.5 MCG/ACT inhaler  Commonly known as: SYMBICORT   2 puffs, Inhalation, 2 Times Daily - RT      calcium carb-cholecalciferol 600-800 MG-UNIT tablet   2 tablets, Oral, Daily      donepezil 5 MG tablet  Commonly known as: ARICEPT   5 mg, Oral, Daily      estradiol 2 MG vaginal ring  Commonly known as: ESTRING   2 mg, Vaginal, Every 3 Months, follow package directions      fish oil 1000 MG capsule capsule   1,000 mg, Oral, 2 Times Daily      letrozole 2.5 MG tablet  Commonly known as: FEMARA   2.5 mg, Oral, Daily      levothyroxine 125 MCG tablet  Commonly known as: SYNTHROID, LEVOTHROID   125 mcg, Oral, Every Early Morning      Oxyquinoline-Sod Lauryl Sulf 0.025-0.01 % gel   Vaginal, 3 Times Weekly      PreserVision AREDS capsule   1 tablet, Oral, 2 Times Daily      silver sulfadiazine 1 % cream  Commonly known as: SILVADENE, SSD   1 application, Topical, As Needed      triamcinolone 0.1 % cream  Commonly known as: KENALOG   1 application, Topical, As Needed      Vitamin D (Cholecalciferol) 10 MCG (400 UNIT) tablet  Commonly known as: CHOLECALCIFEROL   400 Units, Oral, Daily         Stop These Medications    hydrocortisone-bacitracin-zinc oxide-nystatin  Commonly known as: MAGIC BARRIER     nicotine 14 MG/24HR patch  Commonly known as: NICODERM CQ     nicotine polacrilex 2 MG gum  Commonly known as: NICORETTE            Discharge Diet:   Diet Instructions     Diet:  Regular, Cardiac; Thin      Discharge Diet:  Regular  Cardiac       Fluid Consistency: Thin          Activity at Discharge:   Activity Instructions     Activity as Tolerated            Follow-up Appointments:  Future Appointments   Date Time Provider Department Center   10/4/2022  3:00 PM EMS MED 4  [facility]   10/25/2022  9:40 AM LAB CHAIR 5 [facility]  [facility]   10/25/2022 10:00 AM [facility] Cespedes [facility]     Additional Instructions for the Follow-ups that You Need to Schedule     Discharge Follow-up with Specialty: general practitioner or PCP at [facility]   As directed      Specialty: general practitioner or PCP at [facility]    Follow Up Details: 1-3d         Discharge Follow-up with Specified Provider: Oncology   As directed      To: Oncology    Follow Up Details: as scheduled                Alexander Hong MD  10/04/22  11:12 EDT    Time Spent on Discharge Activities: Greater than 30 minutes.

## 2022-10-05 ENCOUNTER — APPOINTMENT (OUTPATIENT)
Dept: GENERAL RADIOLOGY | Facility: HOSPITAL | Age: 82
End: 2022-10-05

## 2022-10-05 ENCOUNTER — APPOINTMENT (OUTPATIENT)
Dept: CT IMAGING | Facility: HOSPITAL | Age: 82
End: 2022-10-05

## 2022-10-05 ENCOUNTER — HOSPITAL ENCOUNTER (INPATIENT)
Facility: HOSPITAL | Age: 82
LOS: 7 days | Discharge: NURSING, REHAB OR LONG-TERM CARE | End: 2022-10-13
Attending: EMERGENCY MEDICINE | Admitting: INTERNAL MEDICINE

## 2022-10-05 DIAGNOSIS — S09.90XA CLOSED HEAD INJURY, INITIAL ENCOUNTER: ICD-10-CM

## 2022-10-05 DIAGNOSIS — S06.6XAA TRAUMATIC SUBARACHNOID HEMORRHAGE WITH UNKNOWN LOSS OF CONSCIOUSNESS STATUS, INITIAL ENCOUNTER: Primary | ICD-10-CM

## 2022-10-05 DIAGNOSIS — U07.1 COVID-19: ICD-10-CM

## 2022-10-05 DIAGNOSIS — R29.6 MULTIPLE FALLS: ICD-10-CM

## 2022-10-05 LAB
ALBUMIN SERPL-MCNC: 4 G/DL (ref 3.5–5.2)
ALBUMIN/GLOB SERPL: 1.2 G/DL
ALP SERPL-CCNC: 77 U/L (ref 39–117)
ALT SERPL W P-5'-P-CCNC: 24 U/L (ref 1–33)
ANION GAP SERPL CALCULATED.3IONS-SCNC: 9.9 MMOL/L (ref 5–15)
AST SERPL-CCNC: 31 U/L (ref 1–32)
B PARAPERT DNA SPEC QL NAA+PROBE: NOT DETECTED
B PERT DNA SPEC QL NAA+PROBE: NOT DETECTED
BASOPHILS # BLD AUTO: 0.02 10*3/MM3 (ref 0–0.2)
BASOPHILS NFR BLD AUTO: 0.1 % (ref 0–1.5)
BILIRUB SERPL-MCNC: 0.4 MG/DL (ref 0–1.2)
BILIRUB UR QL STRIP: NEGATIVE
BUN SERPL-MCNC: 13 MG/DL (ref 8–23)
BUN/CREAT SERPL: 18.8 (ref 7–25)
C PNEUM DNA NPH QL NAA+NON-PROBE: NOT DETECTED
CALCIUM SPEC-SCNC: 9.1 MG/DL (ref 8.6–10.5)
CHLORIDE SERPL-SCNC: 98 MMOL/L (ref 98–107)
CLARITY UR: CLEAR
CO2 SERPL-SCNC: 33.1 MMOL/L (ref 22–29)
COLOR UR: YELLOW
CREAT SERPL-MCNC: 0.69 MG/DL (ref 0.57–1)
D-LACTATE SERPL-SCNC: 1.4 MMOL/L (ref 0.5–2)
DEPRECATED RDW RBC AUTO: 50.7 FL (ref 37–54)
EGFRCR SERPLBLD CKD-EPI 2021: 87.3 ML/MIN/1.73
EOSINOPHIL # BLD AUTO: 0.02 10*3/MM3 (ref 0–0.4)
EOSINOPHIL NFR BLD AUTO: 0.1 % (ref 0.3–6.2)
ERYTHROCYTE [DISTWIDTH] IN BLOOD BY AUTOMATED COUNT: 14.5 % (ref 12.3–15.4)
FLUAV SUBTYP SPEC NAA+PROBE: NOT DETECTED
FLUBV RNA ISLT QL NAA+PROBE: NOT DETECTED
GLOBULIN UR ELPH-MCNC: 3.3 GM/DL
GLUCOSE SERPL-MCNC: 106 MG/DL (ref 65–99)
GLUCOSE UR STRIP-MCNC: NEGATIVE MG/DL
HADV DNA SPEC NAA+PROBE: NOT DETECTED
HCOV 229E RNA SPEC QL NAA+PROBE: NOT DETECTED
HCOV HKU1 RNA SPEC QL NAA+PROBE: NOT DETECTED
HCOV NL63 RNA SPEC QL NAA+PROBE: NOT DETECTED
HCOV OC43 RNA SPEC QL NAA+PROBE: NOT DETECTED
HCT VFR BLD AUTO: 35.3 % (ref 34–46.6)
HGB BLD-MCNC: 11.1 G/DL (ref 12–15.9)
HGB UR QL STRIP.AUTO: NEGATIVE
HMPV RNA NPH QL NAA+NON-PROBE: NOT DETECTED
HPIV1 RNA ISLT QL NAA+PROBE: NOT DETECTED
HPIV2 RNA SPEC QL NAA+PROBE: NOT DETECTED
HPIV3 RNA NPH QL NAA+PROBE: NOT DETECTED
HPIV4 P GENE NPH QL NAA+PROBE: NOT DETECTED
KETONES UR QL STRIP: NEGATIVE
LEUKOCYTE ESTERASE UR QL STRIP.AUTO: NEGATIVE
LYMPHOCYTES # BLD AUTO: 0.82 10*3/MM3 (ref 0.7–3.1)
LYMPHOCYTES NFR BLD AUTO: 6.1 % (ref 19.6–45.3)
M PNEUMO IGG SER IA-ACNC: NOT DETECTED
MCH RBC QN AUTO: 30.2 PG (ref 26.6–33)
MCHC RBC AUTO-ENTMCNC: 31.4 G/DL (ref 31.5–35.7)
MCV RBC AUTO: 95.9 FL (ref 79–97)
MONOCYTES # BLD AUTO: 3.67 10*3/MM3 (ref 0.1–0.9)
MONOCYTES NFR BLD AUTO: 27.1 % (ref 5–12)
NEUTROPHILS NFR BLD AUTO: 62.4 % (ref 42.7–76)
NEUTROPHILS NFR BLD AUTO: 8.44 10*3/MM3 (ref 1.7–7)
NITRITE UR QL STRIP: NEGATIVE
PH UR STRIP.AUTO: 7.5 [PH] (ref 5–8)
PLATELET # BLD AUTO: 523 10*3/MM3 (ref 140–450)
PMV BLD AUTO: 9.6 FL (ref 6–12)
POTASSIUM SERPL-SCNC: 4.5 MMOL/L (ref 3.5–5.2)
PROCALCITONIN SERPL-MCNC: 0.07 NG/ML (ref 0–0.25)
PROT SERPL-MCNC: 7.3 G/DL (ref 6–8.5)
PROT UR QL STRIP: NEGATIVE
QT INTERVAL: 476 MS
RBC # BLD AUTO: 3.68 10*6/MM3 (ref 3.77–5.28)
RHINOVIRUS RNA SPEC NAA+PROBE: NOT DETECTED
RSV RNA NPH QL NAA+NON-PROBE: NOT DETECTED
SARS-COV-2 RNA NPH QL NAA+NON-PROBE: DETECTED
SODIUM SERPL-SCNC: 141 MMOL/L (ref 136–145)
SP GR UR STRIP: 1.01 (ref 1–1.03)
TROPONIN T SERPL-MCNC: <0.01 NG/ML (ref 0–0.03)
UROBILINOGEN UR QL STRIP: NORMAL
WBC NRBC COR # BLD: 13.54 10*3/MM3 (ref 3.4–10.8)

## 2022-10-05 PROCEDURE — 81003 URINALYSIS AUTO W/O SCOPE: CPT | Performed by: PHYSICIAN ASSISTANT

## 2022-10-05 PROCEDURE — 72125 CT NECK SPINE W/O DYE: CPT

## 2022-10-05 PROCEDURE — 70450 CT HEAD/BRAIN W/O DYE: CPT

## 2022-10-05 PROCEDURE — 84484 ASSAY OF TROPONIN QUANT: CPT | Performed by: PHYSICIAN ASSISTANT

## 2022-10-05 PROCEDURE — 80053 COMPREHEN METABOLIC PANEL: CPT | Performed by: PHYSICIAN ASSISTANT

## 2022-10-05 PROCEDURE — 71045 X-RAY EXAM CHEST 1 VIEW: CPT

## 2022-10-05 PROCEDURE — 87040 BLOOD CULTURE FOR BACTERIA: CPT | Performed by: PHYSICIAN ASSISTANT

## 2022-10-05 PROCEDURE — 93010 ELECTROCARDIOGRAM REPORT: CPT | Performed by: INTERNAL MEDICINE

## 2022-10-05 PROCEDURE — 93005 ELECTROCARDIOGRAM TRACING: CPT | Performed by: PHYSICIAN ASSISTANT

## 2022-10-05 PROCEDURE — 0202U NFCT DS 22 TRGT SARS-COV-2: CPT | Performed by: PHYSICIAN ASSISTANT

## 2022-10-05 PROCEDURE — 83605 ASSAY OF LACTIC ACID: CPT | Performed by: PHYSICIAN ASSISTANT

## 2022-10-05 PROCEDURE — 84145 PROCALCITONIN (PCT): CPT | Performed by: PHYSICIAN ASSISTANT

## 2022-10-05 PROCEDURE — 85025 COMPLETE CBC W/AUTO DIFF WBC: CPT | Performed by: PHYSICIAN ASSISTANT

## 2022-10-05 PROCEDURE — 99285 EMERGENCY DEPT VISIT HI MDM: CPT

## 2022-10-05 NOTE — ED TRIAGE NOTES
Pt to ED via EMS from [facility]. Pt had a fall at the nursing home that was not witnessed. Nursing home reports she had 3 falls yesterday after arrival for rehab. Pt denies blood thinners. Pt reports she is currently positive for Covid. Pt has dementia and is AOx1 at baseline.    Pt in mask, this RN in PPE.

## 2022-10-06 ENCOUNTER — APPOINTMENT (OUTPATIENT)
Dept: CT IMAGING | Facility: HOSPITAL | Age: 82
End: 2022-10-06

## 2022-10-06 PROBLEM — S06.6XAA: Status: ACTIVE | Noted: 2022-10-06

## 2022-10-06 PROBLEM — S06.6XAA TRAUMATIC SUBARACHNOID HEMORRHAGE WITH UNKNOWN LOSS OF CONSCIOUSNESS STATUS (HCC): Status: ACTIVE | Noted: 2022-10-06

## 2022-10-06 LAB
GLUCOSE BLDC GLUCOMTR-MCNC: 101 MG/DL (ref 70–130)
GLUCOSE BLDC GLUCOMTR-MCNC: 94 MG/DL (ref 70–130)
SODIUM SERPL-SCNC: 136 MMOL/L (ref 136–145)
SODIUM SERPL-SCNC: 136 MMOL/L (ref 136–145)

## 2022-10-06 PROCEDURE — 94760 N-INVAS EAR/PLS OXIMETRY 1: CPT

## 2022-10-06 PROCEDURE — 70496 CT ANGIOGRAPHY HEAD: CPT

## 2022-10-06 PROCEDURE — 94799 UNLISTED PULMONARY SVC/PX: CPT

## 2022-10-06 PROCEDURE — 70498 CT ANGIOGRAPHY NECK: CPT

## 2022-10-06 PROCEDURE — 82962 GLUCOSE BLOOD TEST: CPT

## 2022-10-06 PROCEDURE — 0 IOPAMIDOL PER 1 ML: Performed by: INTERNAL MEDICINE

## 2022-10-06 PROCEDURE — 99221 1ST HOSP IP/OBS SF/LOW 40: CPT | Performed by: NEUROLOGICAL SURGERY

## 2022-10-06 PROCEDURE — 84295 ASSAY OF SERUM SODIUM: CPT

## 2022-10-06 PROCEDURE — 25010000002 HALOPERIDOL LACTATE PER 5 MG: Performed by: INTERNAL MEDICINE

## 2022-10-06 PROCEDURE — 94664 DEMO&/EVAL PT USE INHALER: CPT

## 2022-10-06 PROCEDURE — 94761 N-INVAS EAR/PLS OXIMETRY MLT: CPT

## 2022-10-06 PROCEDURE — 94640 AIRWAY INHALATION TREATMENT: CPT

## 2022-10-06 RX ORDER — OLANZAPINE 5 MG/1
5 TABLET ORAL 2 TIMES DAILY
Status: DISCONTINUED | OUTPATIENT
Start: 2022-10-06 | End: 2022-10-13 | Stop reason: HOSPADM

## 2022-10-06 RX ORDER — ALUMINA, MAGNESIA, AND SIMETHICONE 2400; 2400; 240 MG/30ML; MG/30ML; MG/30ML
15 SUSPENSION ORAL EVERY 6 HOURS PRN
Status: DISCONTINUED | OUTPATIENT
Start: 2022-10-06 | End: 2022-10-13 | Stop reason: HOSPADM

## 2022-10-06 RX ORDER — ACETAMINOPHEN 325 MG/1
650 TABLET ORAL EVERY 4 HOURS PRN
Status: DISCONTINUED | OUTPATIENT
Start: 2022-10-06 | End: 2022-10-13 | Stop reason: HOSPADM

## 2022-10-06 RX ORDER — LEVOTHYROXINE SODIUM 0.12 MG/1
125 TABLET ORAL
Status: DISCONTINUED | OUTPATIENT
Start: 2022-10-06 | End: 2022-10-13 | Stop reason: HOSPADM

## 2022-10-06 RX ORDER — LETROZOLE 2.5 MG/1
2.5 TABLET, FILM COATED ORAL DAILY
Status: DISCONTINUED | OUTPATIENT
Start: 2022-10-06 | End: 2022-10-13 | Stop reason: HOSPADM

## 2022-10-06 RX ORDER — ATORVASTATIN CALCIUM 20 MG/1
20 TABLET, FILM COATED ORAL DAILY
Status: DISCONTINUED | OUTPATIENT
Start: 2022-10-06 | End: 2022-10-13 | Stop reason: HOSPADM

## 2022-10-06 RX ORDER — DEXAMETHASONE SODIUM PHOSPHATE 10 MG/ML
6 INJECTION INTRAMUSCULAR; INTRAVENOUS ONCE
Status: COMPLETED | OUTPATIENT
Start: 2022-10-07 | End: 2022-10-06

## 2022-10-06 RX ORDER — SODIUM CHLORIDE 0.9 % (FLUSH) 0.9 %
10 SYRINGE (ML) INJECTION EVERY 12 HOURS SCHEDULED
Status: DISCONTINUED | OUTPATIENT
Start: 2022-10-06 | End: 2022-10-13 | Stop reason: HOSPADM

## 2022-10-06 RX ORDER — ACETAMINOPHEN 650 MG/1
650 SUPPOSITORY RECTAL EVERY 4 HOURS PRN
Status: DISCONTINUED | OUTPATIENT
Start: 2022-10-06 | End: 2022-10-13 | Stop reason: HOSPADM

## 2022-10-06 RX ORDER — DONEPEZIL HYDROCHLORIDE 5 MG/1
5 TABLET, FILM COATED ORAL DAILY
Status: DISCONTINUED | OUTPATIENT
Start: 2022-10-06 | End: 2022-10-07

## 2022-10-06 RX ORDER — ONDANSETRON 2 MG/ML
4 INJECTION INTRAMUSCULAR; INTRAVENOUS EVERY 6 HOURS PRN
Status: DISCONTINUED | OUTPATIENT
Start: 2022-10-06 | End: 2022-10-13 | Stop reason: HOSPADM

## 2022-10-06 RX ORDER — HALOPERIDOL 5 MG/ML
2 INJECTION INTRAMUSCULAR EVERY 6 HOURS PRN
Status: DISCONTINUED | OUTPATIENT
Start: 2022-10-06 | End: 2022-10-13 | Stop reason: HOSPADM

## 2022-10-06 RX ORDER — BISACODYL 5 MG/1
10 TABLET, DELAYED RELEASE ORAL DAILY PRN
Status: DISCONTINUED | OUTPATIENT
Start: 2022-10-06 | End: 2022-10-13 | Stop reason: HOSPADM

## 2022-10-06 RX ORDER — SODIUM CHLORIDE 0.9 % (FLUSH) 0.9 %
10 SYRINGE (ML) INJECTION AS NEEDED
Status: DISCONTINUED | OUTPATIENT
Start: 2022-10-06 | End: 2022-10-13 | Stop reason: HOSPADM

## 2022-10-06 RX ORDER — ONDANSETRON 4 MG/1
4 TABLET, FILM COATED ORAL EVERY 6 HOURS PRN
Status: DISCONTINUED | OUTPATIENT
Start: 2022-10-06 | End: 2022-10-13 | Stop reason: HOSPADM

## 2022-10-06 RX ORDER — BUDESONIDE AND FORMOTEROL FUMARATE DIHYDRATE 160; 4.5 UG/1; UG/1
2 AEROSOL RESPIRATORY (INHALATION)
Status: DISCONTINUED | OUTPATIENT
Start: 2022-10-06 | End: 2022-10-13 | Stop reason: HOSPADM

## 2022-10-06 RX ADMIN — Medication 10 ML: at 00:21

## 2022-10-06 RX ADMIN — DONEPEZIL HYDROCHLORIDE 5 MG: 5 TABLET, FILM COATED ORAL at 08:48

## 2022-10-06 RX ADMIN — BUDESONIDE AND FORMOTEROL FUMARATE DIHYDRATE 2 PUFF: 160; 4.5 AEROSOL RESPIRATORY (INHALATION) at 07:52

## 2022-10-06 RX ADMIN — IOPAMIDOL 95 ML: 755 INJECTION, SOLUTION INTRAVENOUS at 05:39

## 2022-10-06 RX ADMIN — LETROZOLE 2.5 MG: 2.5 TABLET, FILM COATED ORAL at 16:04

## 2022-10-06 RX ADMIN — ATORVASTATIN CALCIUM 20 MG: 20 TABLET, FILM COATED ORAL at 08:48

## 2022-10-06 RX ADMIN — Medication 10 ML: at 08:57

## 2022-10-06 RX ADMIN — DEXAMETHASONE SODIUM PHOSPHATE 6 MG: 10 INJECTION INTRAMUSCULAR; INTRAVENOUS at 23:54

## 2022-10-06 RX ADMIN — HALOPERIDOL LACTATE 2 MG: 5 INJECTION, SOLUTION INTRAMUSCULAR at 16:06

## 2022-10-06 RX ADMIN — OLANZAPINE 5 MG: 5 TABLET ORAL at 08:48

## 2022-10-06 RX ADMIN — BUDESONIDE AND FORMOTEROL FUMARATE DIHYDRATE 2 PUFF: 160; 4.5 AEROSOL RESPIRATORY (INHALATION) at 19:43

## 2022-10-06 RX ADMIN — Medication 10 ML: at 21:29

## 2022-10-06 RX ADMIN — OLANZAPINE 5 MG: 5 TABLET ORAL at 21:30

## 2022-10-06 NOTE — PROGRESS NOTES
Patient was seen and examined by my partner earlier today  Chart reviewed  Discussed with the ICU rounding team  Patient was evaluated by neurosurgery with the plan to repeat the CT scan in a.m.  Speech therapy evaluated and it was okay to feed  Patient has underlying baseline dementia  No Hinkle catheter no central line  Continue current supportive measures, will continue to follow

## 2022-10-06 NOTE — ED NOTES
"Nursing report ED to floor  Kennedi Cardeans  82 y.o.  female    HPI :   Chief Complaint   Patient presents with   • Fall       Admitting doctor:   Izaiah Anders MD    Admitting diagnosis:   The primary encounter diagnosis was Traumatic subarachnoid hemorrhage with unknown loss of consciousness status, initial encounter. Diagnoses of COVID-19, Multiple falls, and Closed head injury, initial encounter were also pertinent to this visit.    Code status:   Current Code Status     Date Active Code Status Order ID Comments User Context       Prior    Advance Care Planning Activity          Allergies:   Latex, natural rubber    Isolation:   No active isolations    Intake and Output  No intake or output data in the 24 hours ending 10/06/22 0022    Weight:       10/05/22  2019   Weight: 66.2 kg (146 lb)       Most recent vitals:   Vitals:    10/05/22 2019 10/05/22 2021 10/05/22 2140 10/06/22 0001   BP:  127/82 131/75 108/52   BP Location:  Right arm Left arm    Patient Position:  Sitting Sitting    Pulse:  81 86 85   Resp:  18 16 16   Temp:       SpO2:  97% 93% 94%   Weight: 66.2 kg (146 lb)      Height: 162.6 cm (64\")          Active LDAs/IV Access:   Lines, Drains & Airways     Active LDAs     Name Placement date Placement time Site Days    Peripheral IV 10/05/22 2100 Right;Upper Arm 10/05/22  2100  Arm  less than 1    External Urinary Catheter 10/05/22  2140  --  less than 1                Labs (abnormal labs have a star):   Labs Reviewed   RESPIRATORY PANEL PCR W/ COVID-19 (SARS-COV-2) [facility] IN-HOUSE, NP SWAB IN UTM/VTP, 3-4 HR TAT - Abnormal; Notable for the following components:       Result Value    COVID19 Detected (*)     All other components within normal limits    Narrative:     In the setting of a positive respiratory panel with a viral infection PLUS a negative procalcitonin without other underlying concern for bacterial infection, consider observing off antibiotics or discontinuation " of antibiotics and continue supportive care. If the respiratory panel is positive for atypical bacterial infection (Bordetella pertussis, Chlamydophila pneumoniae, or Mycoplasma pneumoniae), consider antibiotic de-escalation to target atypical bacterial infection.   COMPREHENSIVE METABOLIC PANEL - Abnormal; Notable for the following components:    Glucose 106 (*)     CO2 33.1 (*)     All other components within normal limits    Narrative:     GFR Normal >60  Chronic Kidney Disease <60  Kidney Failure <15     CBC WITH AUTO DIFFERENTIAL - Abnormal; Notable for the following components:    WBC 13.54 (*)     RBC 3.68 (*)     Hemoglobin 11.1 (*)     MCHC 31.4 (*)     Platelets 523 (*)     Lymphocyte % 6.1 (*)     Monocyte % 27.1 (*)     Eosinophil % 0.1 (*)     Neutrophils, Absolute 8.44 (*)     Monocytes, Absolute 3.67 (*)     All other components within normal limits   URINALYSIS W/ MICROSCOPIC IF INDICATED (NO CULTURE) - Normal    Narrative:     Urine microscopic not indicated.   TROPONIN (IN-HOUSE) - Normal    Narrative:     Troponin T Reference Range:  <= 0.03 ng/mL-   Negative for AMI  >0.03 ng/mL-     Abnormal for myocardial necrosis.  Clinicians would have to utilize clinical acumen, EKG, Troponin and serial changes to determine if it is an Acute Myocardial Infarction or myocardial injury due to an underlying chronic condition.       Results may be falsely decreased if patient taking Biotin.     PROCALCITONIN - Normal    Narrative:     As a Marker for Sepsis (Non-Neonates):    1. <0.5 ng/mL represents a low risk of severe sepsis and/or septic shock.  2. >2 ng/mL represents a high risk of severe sepsis and/or septic shock.    As a Marker for Lower Respiratory Tract Infections that require antibiotic therapy:    PCT on Admission    Antibiotic Therapy       6-12 Hrs later    >0.5                Strongly Recommended  >0.25 - <0.5        Recommended   0.1 - 0.25          Discouraged              Remeasure/reassess  "PCT  <0.1                Strongly Discouraged     Remeasure/reassess PCT    As 28 day mortality risk marker: \"Change in Procalcitonin Result\" (>80% or <=80%) if Day 0 (or Day 1) and Day 4 values are available. Refer to http://www.[facility]-pct-calculator.com    Change in PCT <=80%  A decrease of PCT levels below or equal to 80% defines a positive change in PCT test result representing a higher risk for 28-day all-cause mortality of patients diagnosed with severe sepsis for septic shock.    Change in PCT >80%  A decrease of PCT levels of more than 80% defines a negative change in PCT result representing a lower risk for 28-day all-cause mortality of patients diagnosed with severe sepsis or septic shock.      LACTIC ACID, PLASMA - Normal   BLOOD CULTURE   BLOOD CULTURE   POCT GLUCOSE FINGERSTICK   POCT GLUCOSE FINGERSTICK   POCT GLUCOSE FINGERSTICK   POCT GLUCOSE FINGERSTICK   CBC AND DIFFERENTIAL    Narrative:     The following orders were created for panel order CBC & Differential.  Procedure                               Abnormality         Status                     ---------                               -----------         ------                     CBC Auto Differential[[id number]]        Abnormal            Final result                 Please view results for these tests on the individual orders.       EKG:   ECG 12 Lead   Final Result   HEART RATE= 84  bpm   RR Interval= 714  ms   UT Interval= 163  ms   P Horizontal Axis= -7  deg   P Front Axis= 62  deg   QRSD Interval= 72  ms   QT Interval= 476  ms   QRS Axis= 70  deg   T Wave Axis= -26  deg   - ABNORMAL ECG -   Sinus rhythm   Low voltage with right axis deviation   Poor R wave progression   Prolonged QT interval   Compared to the prior EKG, atrial and ventricular ectopy have resolved   Electronically Signed By: Dennis Muller ([facility]) 05-Oct-2022 21:53:18   Date and Time of Study: 2022-10-05 20:11:09          Meds given in ED:   Medications   sodium chloride " 0.9 % flush 10 mL (10 mL Intravenous Given 10/6/22 0021)   sodium chloride 0.9 % flush 10 mL (has no administration in time range)   aluminum-magnesium hydroxide-simethicone (MAALOX MAX) 400-400-40 MG/5ML suspension 15 mL (has no administration in time range)   acetaminophen (TYLENOL) tablet 650 mg (has no administration in time range)     Or   acetaminophen (TYLENOL) suppository 650 mg (has no administration in time range)   ondansetron (ZOFRAN) tablet 4 mg (has no administration in time range)     Or   ondansetron (ZOFRAN) injection 4 mg (has no administration in time range)       Imaging results:  CT Head Without Contrast    Result Date: 10/5/2022   1. Parafalcine hemorrhage, favored to be subarachnoid. 2. No acute fracture or subluxation of the spine. 3. Extensive sinus inflammatory changes, concerning for sinusitis. Bilateral mastoid effusions may represent mastoiditis.  FINDINGS were relayed to Salbador Lara at 11:40 PM.  Radiation dose reduction techniques were utilized, including automated exposure control and exposure modulation based on body size.  This report was finalized on 10/5/2022 11:42 PM by Dr. Rocío Schreiber M.D.      CT Cervical Spine Without Contrast    Result Date: 10/5/2022   1. Parafalcine hemorrhage, favored to be subarachnoid. 2. No acute fracture or subluxation of the spine. 3. Extensive sinus inflammatory changes, concerning for sinusitis. Bilateral mastoid effusions may represent mastoiditis.  FINDINGS were relayed to Salbador Lara at 11:40 PM.  Radiation dose reduction techniques were utilized, including automated exposure control and exposure modulation based on body size.  This report was finalized on 10/5/2022 11:42 PM by Dr. Rocío Schreiber M.D.      XR Chest 1 View    Result Date: 10/5/2022  Mild atelectasis or infiltrate at the bases. Cardiomegaly. Tortuous aorta.  This report was finalized on 10/5/2022 8:02 PM by Dr. Toby Lang M.D.        Ambulatory status:   -  assist x2     Social issues:   Social History     Socioeconomic History   • Marital status: Unknown   Tobacco Use   • Smoking status: Current Every Day Smoker     Packs/day: 0.50     Years: 20.00     Pack years: 10.00     Types: Cigarettes   • Smokeless tobacco: Never Used   Vaping Use   • Vaping Use: Never used   Substance and Sexual Activity   • Alcohol use: Yes     Comment: SOCIALLY    • Drug use: No   • Sexual activity: Defer       NIH Stroke Scale:         Shadi Calero RN  10/06/22 00:22 EDT

## 2022-10-06 NOTE — ED PROVIDER NOTES
EMERGENCY DEPARTMENT ENCOUNTER    Room Number: 01/01  Date seen: 10/6/2022  Time seen: 23:35 EDT  PCP: Duncan Garcia MD    Spoken Language:  English  Language interpretation services not needed     CHIEF COMPLAINT: Multiple falls    HPI: Kennedi Cardenas is a 82 y.o. female, on no antiplatelet or anticoagulant therapy at home, with recent diagnosis of COVID and past medical history of hypertension, hyperlipidemia, adenocarcinoma of the lung and dementia presenting to the ED for evaluation of multiple falls. The history is being obtained by the patient, her son and by review of the medical chart.  According to the patient's son, the patient was admitted to [facility] yesterday.  He states that he received a phone call today stating that she had a fall.  Of note, EMS also reports that the patient had 3 falls yesterday after arriving to the nursing facility.  The patient's son states that the patient initially presented with fatigue, difficulty walking and confusion.  She was found to be hypoxic and later found to be COVID-positive.  This was approximately 10 days ago.  The patient thinks that she hit her head.  She denies any pain or injury from her fall.  She denies chest pain, shortness of breath or abdominal pain.    MEDICAL RECORD REVIEW:  Reviewed in [employer].     PAST MEDICAL HISTORY  Past Medical History:   Diagnosis Date   • Breast cancer (HCC)     Bilateral   • Cancer (HCC)    • Cataracts, bilateral    • Dementia (HCC)    • Hyperlipidemia    • Hypertension    • Hypothyroidism    • Macular degeneration    • Skin cancer     Left axilla, right leg, right cheek   • Uterine prolapse        PAST SURGICAL HISTORY  Past Surgical History:   Procedure Laterality Date   • BREAST BIOPSY Left 2006    Benign   • BREAST LUMPECTOMY Bilateral 2018    Dr. Lily Stevens   • EYE SURGERY     • SKIN CANCER EXCISION      Right cheek, left axilla, right leg   • TOTAL HIP ARTHROPLASTY Left 02/23/2019    Procedure: TOTAL HIP  ARTHROPLASTY ANTERIOR;  Surgeon: Magdalena De Los Santos MD;  Location: [facility];  Service: Orthopedics       FAMILY HISTORY  Family History   Problem Relation Age of Onset   • Hypertension Mother    • Diabetes Mother    • Breast cancer Mother    • Lung cancer Father    • Hyperlipidemia Sister    • Hypertension Brother        SOCIAL HISTORY  Social History     Socioeconomic History   • Marital status: Unknown   Tobacco Use   • Smoking status: Current Every Day Smoker     Packs/day: 0.50     Years: 20.00     Pack years: 10.00     Types: Cigarettes   • Smokeless tobacco: Never Used   Vaping Use   • Vaping Use: Never used   Substance and Sexual Activity   • Alcohol use: Yes     Comment: SOCIALLY    • Drug use: No   • Sexual activity: Defer       CURRENT MEDICATIONS  Prior to Admission medications    Medication Sig Start Date End Date Taking? Authorizing Provider   acetaminophen (TYLENOL) 325 MG tablet Take 2 tablets by mouth Every 4 (Four) Hours As Needed for Mild Pain . 2/26/19   Mor Chow MD   atorvastatin (LIPITOR) 20 MG tablet Take 1 tablet by mouth Daily for 60 days. 9/4/22 11/3/22  Jim Borrego MD   bisacodyl (DULCOLAX) 5 MG EC tablet Take 2 tablets by mouth Daily As Needed for Constipation. 2/26/19   Mor Chow MD   budesonide-formoterol (SYMBICORT) 160-4.5 MCG/ACT inhaler Inhale 2 puffs 2 (Two) Times a Day for 30 days. 9/3/22 10/3/22  Jim Borrego MD   calcium carb-cholecalciferol 600-800 MG-UNIT tablet Take 2 tablets by mouth Daily.    ProviderJohn MD   dexamethasone (DECADRON) 1.5 MG tablet Take 2 tablets by mouth Daily With Breakfast for 1 dose. 10/5/22 10/6/22  Alexander Hong MD   donepezil (ARICEPT) 5 MG tablet Take 5 mg by mouth Daily. 8/13/22   John Lees MD   estradiol (ESTRING) 2 MG vaginal ring Insert 2 mg into the vagina Every 3 (Three) Months. follow package directions    John Lees MD   letrozole (FEMARA) 2.5 MG  tablet Take 2.5 mg by mouth Daily.    John Lees MD   levothyroxine (SYNTHROID, LEVOTHROID) 125 MCG tablet Take 1 tablet by mouth Every Morning for 60 days. 9/4/22 11/3/22  Jim Borrego MD   Multiple Vitamins-Minerals (PRESERVISION AREDS) capsule Take 1 tablet by mouth 2 (two) times a day.   12/16/14   John Lees MD   OLANZapine (zyPREXA) 5 MG tablet Take 1 tablet by mouth 2 (Two) Times a Day. 10/4/22   Alexander Hong MD   Omega-3 Fatty Acids (FISH OIL) 1000 MG capsule capsule Take 1,000 mg by mouth 2 (Two) Times a Day.    John Lees MD   Oxyquinoline-Sod Lauryl Sulf 0.025-0.01 % gel Insert  into the vagina 3 (Three) Times a Week.    John Lees MD   silver sulfadiazine (SILVADENE, SSD) 1 % cream Apply 1 application topically to the appropriate area as directed As Needed for Wound Care.    John Lees MD   triamcinolone (KENALOG) 0.1 % cream Apply 1 application topically to the appropriate area as directed As Needed.    John Lees MD   Vitamin D, Cholecalciferol, (CHOLECALCIFEROL) 400 units tablet Take 400 Units by mouth Daily.    John Lees MD       ALLERGIES  Latex, natural rubber    REVIEW OF SYSTEMS  All systems reviewed and negative except for those discussed in HPI.     PHYSICAL EXAM  ED Triage Vitals   Temp Heart Rate Resp BP SpO2   10/05/22 1909 10/05/22 1909 10/05/22 1909 10/05/22 1909 10/05/22 1909   98.3 °F (36.8 °C) 82 20 134/76 100 %      Temp src Heart Rate Source Patient Position BP Location FiO2 (%)   -- 10/05/22 1931 10/05/22 2021 10/05/22 2021 --    Monitor Sitting Right arm        Physical Exam  Constitutional:       Appearance: Normal appearance.   HENT:      Head: Normocephalic.      Comments: Subacute bruising and scab noted to the left frontal scalp     Mouth/Throat:      Mouth: Mucous membranes are moist.   Eyes:      Extraocular Movements: Extraocular movements intact.      Pupils: Pupils are equal,  round, and reactive to light.   Cardiovascular:      Rate and Rhythm: Normal rate and regular rhythm.   Pulmonary:      Effort: Pulmonary effort is normal.      Breath sounds: Normal breath sounds.   Abdominal:      General: There is no distension.      Palpations: Abdomen is soft.      Tenderness: There is no abdominal tenderness.   Musculoskeletal:      Cervical back: Normal range of motion.      Comments: No pain with internal and external rotation of the bilateral hips.  Full range of motion of the bilateral knees and ankles.   Skin:     General: Skin is warm and dry.   Neurological:      General: No focal deficit present.      Mental Status: She is alert and oriented to person, place, and time.   Psychiatric:         Mood and Affect: Mood normal.         Behavior: Behavior normal.         Thought Content: Thought content normal.         Judgment: Judgment normal.         PROCEDURES  Procedures  None    LABS  Recent Results (from the past 24 hour(s))   ECG 12 Lead    Collection Time: 10/05/22  8:11 PM   Result Value Ref Range    QT Interval 476 ms   Comprehensive Metabolic Panel    Collection Time: 10/05/22  9:00 PM    Specimen: Arm, Right; Blood   Result Value Ref Range    Glucose 106 (H) 65 - 99 mg/dL    BUN 13 8 - 23 mg/dL    Creatinine 0.69 0.57 - 1.00 mg/dL    Sodium 141 136 - 145 mmol/L    Potassium 4.5 3.5 - 5.2 mmol/L    Chloride 98 98 - 107 mmol/L    CO2 33.1 (H) 22.0 - 29.0 mmol/L    Calcium 9.1 8.6 - 10.5 mg/dL    Total Protein 7.3 6.0 - 8.5 g/dL    Albumin 4.00 3.50 - 5.20 g/dL    ALT (SGPT) 24 1 - 33 U/L    AST (SGOT) 31 1 - 32 U/L    Alkaline Phosphatase 77 39 - 117 U/L    Total Bilirubin 0.4 0.0 - 1.2 mg/dL    Globulin 3.3 gm/dL    A/G Ratio 1.2 g/dL    BUN/Creatinine Ratio 18.8 7.0 - 25.0    Anion Gap 9.9 5.0 - 15.0 mmol/L    eGFR 87.3 >60.0 mL/min/1.73   Troponin    Collection Time: 10/05/22  9:00 PM    Specimen: Arm, Right; Blood   Result Value Ref Range    Troponin T <0.010 0.000 - 0.030 ng/mL    Procalcitonin    Collection Time: 10/05/22  9:00 PM    Specimen: Arm, Right; Blood   Result Value Ref Range    Procalcitonin 0.07 0.00 - 0.25 ng/mL   Lactic Acid, Plasma    Collection Time: 10/05/22  9:00 PM    Specimen: Arm, Right; Blood   Result Value Ref Range    Lactate 1.4 0.5 - 2.0 mmol/L   CBC Auto Differential    Collection Time: 10/05/22  9:00 PM    Specimen: Arm, Right; Blood   Result Value Ref Range    WBC 13.54 (H) 3.40 - 10.80 10*3/mm3    RBC 3.68 (L) 3.77 - 5.28 10*6/mm3    Hemoglobin 11.1 (L) 12.0 - 15.9 g/dL    Hematocrit 35.3 34.0 - 46.6 %    MCV 95.9 79.0 - 97.0 fL    MCH 30.2 26.6 - 33.0 pg    MCHC 31.4 (L) 31.5 - 35.7 g/dL    RDW 14.5 12.3 - 15.4 %    RDW-SD 50.7 37.0 - 54.0 fl    MPV 9.6 6.0 - 12.0 fL    Platelets 523 (H) 140 - 450 10*3/mm3    Neutrophil % 62.4 42.7 - 76.0 %    Lymphocyte % 6.1 (L) 19.6 - 45.3 %    Monocyte % 27.1 (H) 5.0 - 12.0 %    Eosinophil % 0.1 (L) 0.3 - 6.2 %    Basophil % 0.1 0.0 - 1.5 %    Neutrophils, Absolute 8.44 (H) 1.70 - 7.00 10*3/mm3    Lymphocytes, Absolute 0.82 0.70 - 3.10 10*3/mm3    Monocytes, Absolute 3.67 (H) 0.10 - 0.90 10*3/mm3    Eosinophils, Absolute 0.02 0.00 - 0.40 10*3/mm3    Basophils, Absolute 0.02 0.00 - 0.20 10*3/mm3   Urinalysis With Microscopic If Indicated (No Culture) - Urine, Clean Catch    Collection Time: 10/05/22  9:41 PM    Specimen: Urine, Clean Catch   Result Value Ref Range    Color, UA Yellow Yellow, Straw    Appearance, UA Clear Clear    pH, UA 7.5 5.0 - 8.0    Specific Gravity, UA 1.011 1.005 - 1.030    Glucose, UA Negative Negative    Ketones, UA Negative Negative    Bilirubin, UA Negative Negative    Blood, UA Negative Negative    Protein, UA Negative Negative    Leuk Esterase, UA Negative Negative    Nitrite, UA Negative Negative    Urobilinogen, UA 0.2 E.U./dL 0.2 - 1.0 E.U./dL   Respiratory Panel PCR w/COVID-19(SARS-CoV-2) KHANG/NOAM/DAKOTA/PAD/COR/MAD/VAISHNAVI In-House, NP Swab in [facility], 3-4 HR TAT - Swab, Nasopharynx    Collection  Time: 10/05/22  9:41 PM    Specimen: Nasopharynx; Swab   Result Value Ref Range    ADENOVIRUS, PCR Not Detected Not Detected    Coronavirus 229E Not Detected Not Detected    Coronavirus HKU1 Not Detected Not Detected    Coronavirus NL63 Not Detected Not Detected    Coronavirus OC43 Not Detected Not Detected    COVID19 Detected (C) Not Detected - Ref. Range    Human Metapneumovirus Not Detected Not Detected    Human Rhinovirus/Enterovirus Not Detected Not Detected    Influenza A PCR Not Detected Not Detected    Influenza B PCR Not Detected Not Detected    Parainfluenza Virus 1 Not Detected Not Detected    Parainfluenza Virus 2 Not Detected Not Detected    Parainfluenza Virus 3 Not Detected Not Detected    Parainfluenza Virus 4 Not Detected Not Detected    RSV, PCR Not Detected Not Detected    Bordetella pertussis pcr Not Detected Not Detected    Bordetella parapertussis PCR Not Detected Not Detected    Chlamydophila pneumoniae PCR Not Detected Not Detected    Mycoplasma pneumo by PCR Not Detected Not Detected       RADIOLOGY  CT Head Without Contrast   Final Result       1. Parafalcine hemorrhage, favored to be subarachnoid.   2. No acute fracture or subluxation of the spine.   3. Extensive sinus inflammatory changes, concerning for sinusitis.   Bilateral mastoid effusions may represent mastoiditis.       FINDINGS were relayed to [facility] at 11:40 PM.       Radiation dose reduction techniques were utilized, including automated   exposure control and exposure modulation based on body size.       This report was finalized on 10/5/2022 11:42 PM by Dr. Rocío Schreiber M.D.          CT Cervical Spine Without Contrast   Final Result       1. Parafalcine hemorrhage, favored to be subarachnoid.   2. No acute fracture or subluxation of the spine.   3. Extensive sinus inflammatory changes, concerning for sinusitis.   Bilateral mastoid effusions may represent mastoiditis.       FINDINGS were relayed to [facility] at  11:40 PM.       Radiation dose reduction techniques were utilized, including automated   exposure control and exposure modulation based on body size.       This report was finalized on 10/5/2022 11:42 PM by Dr. Rocío Schreiber M.D.          XR Chest 1 View   Final Result   Mild atelectasis or infiltrate at the bases. Cardiomegaly.   Tortuous aorta.       This report was finalized on 10/5/2022 8:02 PM by Dr. Toby Lang M.D.              MEDICATIONS GIVEN IN THE ER  Medications - No data to display    MEDICAL DECISION MAKING, CONSULTS AND PROGRESS NOTES  All labs and all radiology studies were have been viewed and interpreted by me.   Discussion below represents my analysis of pertinent findings related to patient's condition, differential diagnosis, treatment plan and final disposition.    PPE: The patient was placed in a face mask in first look. Patient was wearing facemask when I entered the room and throughout our encounter. I wore full protective equipment throughout this patient encounter including a face mask, eye protection and gloves. Hand hygiene was performed before donning protective equipment and after removal when leaving the room.    AS OF 00:15 EDT VITALS:    BP - 131/75  HR - 86  TEMP - 98.3 °F (36.8 °C)  O2 SATS - 93%    ED Course as of 10/06/22 0015   u Oct 06, 2022   0005 I discussed the patient's overall care with Dr. Donnelly, on-call for neurosurgery.  He recommends that the patient be admitted to the ICU and have a repeat head CT at 2 PM. [AR]   0014 I discussed the patient's overall care with Dr. Foster.  He is agreeable to admission to the ICU. [AR]      ED Course User Index  [AR] Dulce Maria Lara PA       The patient presents with multiple falls since being admitted to her nursing facility yesterday.  She has a recent diagnosis of COVID.  Head CT imaging demonstrates a small area of traumatic subarachnoid hemorrhage.  She will need to be admitted to the hospital.    Based on  the patient's lab findings and presenting symptoms, the doctor and I feel it is appropriate to admit the patient for further management, evaluation, and treatment.  I have discussed this with the admitting team.  I have also discussed this with the patient/family.  They are in agreement with admission.      DIAGNOSIS   Diagnosis Plan   1. Traumatic subarachnoid hemorrhage with unknown loss of consciousness status, initial encounter     2. COVID-19     3. Multiple falls     4. Closed head injury, initial encounter         DISPOSITION  ED Disposition     ED Disposition   Decision to Admit    Condition   --    Comment   Level of Care: Critical Care [6]   Diagnosis: Traumatic subarachnoid hemorrhage with unknown loss of consciousness status, initial encounter [[id number]]   Admitting Physician: DANIELA ADDISON [[id number]]   Attending Physician: DANIELA ADDISON [[id number]]   Certification: I Certify That Inpatient Hospital Services Are Medically Necessary For Greater Than 2 Midnights             RX  Medications - No data to display       Medication List      Stop    dexamethasone 1.5 MG tablet  Commonly known as: DECADRON          Provider Attestation:  I personally reviewed the past medical history, past surgical history, social history, family history, current medications and allergies as they appear in the chart. I reviewed the patient's history, physical, lab/imaging results and overall care with Dr. Lucero who is in agreement with the patient's treatment plan.    EMR Dragon/Transcription disclaimer:  Dictated using Dragon dictation    Provider note signed by:         Dulce Maria Lara PA  10/06/22 0016

## 2022-10-06 NOTE — CONSULTS
Neurosurgery consult note    Post trauma day #1 s/p ground-level fall with a small amount of traumatic subarachnoid hemorrhage in the interhemispheric fissure    HPI: 82-year-old female admitted to the ICU last night.  She was brought into the emergency room after a ground-level fall.  She reports that she was turning and lost her footing and hit her head.  She denies any loss of consciousness.  She denies any changes in strength or sensation.  Denies any strokelike episodes.  Denies any changes in the frequency or severity of her headaches.  Denies any current headaches.  Denies any changes in vision.  She has a history of dementia however is awake, alert, oriented x3 for me at the time of my exam.  She is able to easily answer my questions    All other review of systems negative    Past Medical History:   Diagnosis Date   • Breast cancer (HCC)     Bilateral   • Cancer (HCC)    • Cataracts, bilateral    • Dementia (HCC)    • Hyperlipidemia    • Hypertension    • Hypothyroidism    • Macular degeneration    • Skin cancer     Left axilla, right leg, right cheek   • Uterine prolapse      No current facility-administered medications on file prior to encounter.     Current Outpatient Medications on File Prior to Encounter   Medication Sig Dispense Refill   • acetaminophen (TYLENOL) 325 MG tablet Take 2 tablets by mouth Every 4 (Four) Hours As Needed for Mild Pain .     • atorvastatin (LIPITOR) 20 MG tablet Take 1 tablet by mouth Daily for 60 days. 30 tablet 1   • bisacodyl (DULCOLAX) 5 MG EC tablet Take 2 tablets by mouth Daily As Needed for Constipation.     • budesonide-formoterol (SYMBICORT) 160-4.5 MCG/ACT inhaler Inhale 2 puffs 2 (Two) Times a Day for 30 days. 1 each 1   • calcium carb-cholecalciferol 600-800 MG-UNIT tablet Take 2 tablets by mouth Daily.     • dexamethasone (DECADRON) 1.5 MG tablet Take 2 tablets by mouth Daily With Breakfast for 1 dose. 2 tablet 0   • donepezil (ARICEPT) 5 MG tablet Take 5 mg by  mouth Daily.     • estradiol (ESTRING) 2 MG vaginal ring Insert 2 mg into the vagina Every 3 (Three) Months. follow package directions     • letrozole (FEMARA) 2.5 MG tablet Take 2.5 mg by mouth Daily.     • levothyroxine (SYNTHROID, LEVOTHROID) 125 MCG tablet Take 1 tablet by mouth Every Morning for 60 days. 30 tablet 1   • Multiple Vitamins-Minerals (PRESERVISION AREDS) capsule Take 1 tablet by mouth 2 (two) times a day.       • OLANZapine (zyPREXA) 5 MG tablet Take 1 tablet by mouth 2 (Two) Times a Day.     • Omega-3 Fatty Acids (FISH OIL) 1000 MG capsule capsule Take 1,000 mg by mouth 2 (Two) Times a Day.     • Oxyquinoline-Sod Lauryl Sulf 0.025-0.01 % gel Insert  into the vagina 3 (Three) Times a Week.     • silver sulfadiazine (SILVADENE, SSD) 1 % cream Apply 1 application topically to the appropriate area as directed As Needed for Wound Care.     • triamcinolone (KENALOG) 0.1 % cream Apply 1 application topically to the appropriate area as directed As Needed.     • Vitamin D, Cholecalciferol, (CHOLECALCIFEROL) 400 units tablet Take 400 Units by mouth Daily.        Allergies   Allergen Reactions   • Latex, Natural Rubber Rash     Past Surgical History:   Procedure Laterality Date   • BREAST BIOPSY Left 2006    Benign   • BREAST LUMPECTOMY Bilateral 2018    Dr. Lily Stevens   • EYE SURGERY     • SKIN CANCER EXCISION      Right cheek, left axilla, right leg   • TOTAL HIP ARTHROPLASTY Left 02/23/2019    Procedure: TOTAL HIP ARTHROPLASTY ANTERIOR;  Surgeon: Magdalena De Los Santos MD;  Location: [facility];  Service: Orthopedics      Family History   Problem Relation Age of Onset   • Hypertension Mother    • Diabetes Mother    • Breast cancer Mother    • Lung cancer Father    • Hyperlipidemia Sister    • Hypertension Brother      Social History     Socioeconomic History   • Marital status: Unknown   Tobacco Use   • Smoking status: Current Every Day Smoker     Packs/day: 0.50     Years: 20.00     Pack years:  10.00     Types: Cigarettes   • Smokeless tobacco: Never Used   Vaping Use   • Vaping Use: Never used   Substance and Sexual Activity   • Alcohol use: Yes     Comment: SOCIALLY    • Drug use: No   • Sexual activity: Defer         Physical exam  Awake, alert, oriented x3  Pupils equal round reactive to light  Extraocular muscles intact  Face symmetric  Speech is fluent and clear  No pronator drift  Motor exam  Bilateral deltoids 5/5, bilateral biceps 5/5, bilateral triceps 5/5, bilateral wrist extension 5/5 bilateral hand  5/5  Bilateral hip flexion 5/5, bilateral knee extension 5/5, bilateral DF/PF 5/5  No clonus  No Seth's reflex  gait deferred  Able to detect  light touch in all 4 extremities      Imaging   I personally reviewed the following imaging studies  CT head and cervical spine without contrast from October 5, 2022, CTA head and neck from October 6, 2022  The CT head images demonstrate a small amount of traumatic subarachnoid hemorrhage within the interhemispheric fissure.  There is no significant mass-effect or midline shift associated with this hemorrhage.  The follow-up CTA images show no evidence of intracranial aneurysm or AVM.  There is no significant expansion of the hematoma      Assessment/plan  82-year-old female posttrauma day #1 status post ground-level fall with traumatic subarachnoid hemorrhage  -She is doing well today.  Denies any loss of consciousness.  She reports she lost her footing and hit her head yesterday  -Recommend hold anticoagulation and antiplatelet agents for now  -Maintain systolic blood pressure less than 150 mmHg to prevent further expansion of the hemorrhage  -Continue with at least every 2 hour neurochecks and ICU care  -Plan to repeat CT head tomorrow to evaluate for stability of the hemorrhage or repeat the CTA earlier if change in neurologic exam

## 2022-10-06 NOTE — ED PROVIDER NOTES
MD ATTESTATION NOTE    The LUNA and I have discussed this patient's history, physical exam, and treatment plan.  I have reviewed the documentation and personally had a face to face interaction with the patient. I affirm the documentation and agree with the treatment and plan.  The attached note describes my personal findings.      I provided a substantive portion of the care of the patient.  I personally performed the physical exam in its entirety, and below are my findings.  For this patient encounter, the patient wore surgical mask, I wore full protective PPE including N95 and eye protection.      Brief HPI: Patient was discharged from here to the nursing home yesterday.  She had an unwitnessed fall yesterday and another 1 today.  Patient believes she hit her head.  History is limited secondary to dementia.  She denies neck pain, back pain, chest pain, abdominal pain, or extremity pain.    PHYSICAL EXAM  ED Triage Vitals   Temp Heart Rate Resp BP SpO2   10/05/22 1909 10/05/22 1909 10/05/22 1909 10/05/22 1909 10/05/22 1909   98.3 °F (36.8 °C) 82 20 134/76 100 %      Temp src Heart Rate Source Patient Position BP Location FiO2 (%)   -- 10/05/22 1931 10/05/22 2021 10/05/22 2021 --    Monitor Sitting Right arm          GENERAL: Awake and alert.  Oriented to self.  Elderly female.  Resting comfortably in no acute distress  HENT: There is a healing abrasion on the left scalp.  EYES: no scleral icterus  CV: regular rhythm, normal rate  RESPIRATORY: normal effort, clear to auscultation bilateral  ABDOMEN: soft, nontender  MUSCULOSKELETAL: Extremities are nontender with full range of motion  NEURO: alert, moves all extremities, follows commands  PSYCH:  calm, cooperative  SKIN: warm, dry    Vital signs and nursing notes reviewed.    EKG          EKG time: 2011  Rhythm/Rate: Sinus rhythm, rate 84  P waves and DC: Normal  QRS, axis: RAD, low voltage in multiple leads  ST and T waves: Nonspecific ST/T wave changes in multiple  leads    Interpreted Contemporaneously by me at 2020, independently viewed  EKG is not significantly changed compared to prior EKG done on 9/25/2022      Plan: Obtain labs and imaging studies.    Chest x-ray shows mild bibasilar atelectasis/infiltrates.  COVID test was positive.  Head CT showed a subarachnoid hemorrhage.  CT of the C-spine was negative for fracture.  Case was discussed with neurosurgery and the patient will be admitted to the ICU.     Robert Lucero MD  10/06/22 021

## 2022-10-06 NOTE — ED NOTES
Nursing report ED to floor  Kennedi Cardenas  82 y.o.  female    HPI :   Chief Complaint   Patient presents with   • Fall       Admitting doctor:   Izaiah Anders MD    Admitting diagnosis:   The primary encounter diagnosis was Traumatic subarachnoid hemorrhage with unknown loss of consciousness status, initial encounter. Diagnoses of COVID-19, Multiple falls, and Closed head injury, initial encounter were also pertinent to this visit.    Code status:   Current Code Status     Date Active Code Status Order ID Comments User Context       Prior    Advance Care Planning Activity          Allergies:   Latex, natural rubber    Isolation:   No active isolations    Intake and Output  No intake or output data in the 24 hours ending 10/06/22 0151    Weight:       10/05/22  2019   Weight: 66.2 kg (146 lb)       Most recent vitals:   Vitals:    10/05/22 2021 10/05/22 2140 10/06/22 0001 10/06/22 0031   BP: 127/82 131/75 108/52 108/77   BP Location: Right arm Left arm     Patient Position: Sitting Sitting     Pulse: 81 86 85 87   Resp: 18 16 16 16   Temp:       SpO2: 97% 93% 94% 93%   Weight:       Height:           Active LDAs/IV Access:   Lines, Drains & Airways     Active LDAs     Name Placement date Placement time Site Days    Peripheral IV 10/05/22 2100 Right;Upper Arm 10/05/22  2100  Arm  less than 1    External Urinary Catheter 10/05/22  2140  --  less than 1                Labs (abnormal labs have a star):   Labs Reviewed   RESPIRATORY PANEL PCR W/ COVID-19 (SARS-COV-2) [facility] IN-HOUSE, NP SWAB IN UTM/VTP, 3-4 HR TAT - Abnormal; Notable for the following components:       Result Value    COVID19 Detected (*)     All other components within normal limits    Narrative:     In the setting of a positive respiratory panel with a viral infection PLUS a negative procalcitonin without other underlying concern for bacterial infection, consider observing off antibiotics or discontinuation of antibiotics  and continue supportive care. If the respiratory panel is positive for atypical bacterial infection (Bordetella pertussis, Chlamydophila pneumoniae, or Mycoplasma pneumoniae), consider antibiotic de-escalation to target atypical bacterial infection.   COMPREHENSIVE METABOLIC PANEL - Abnormal; Notable for the following components:    Glucose 106 (*)     CO2 33.1 (*)     All other components within normal limits    Narrative:     GFR Normal >60  Chronic Kidney Disease <60  Kidney Failure <15     CBC WITH AUTO DIFFERENTIAL - Abnormal; Notable for the following components:    WBC 13.54 (*)     RBC 3.68 (*)     Hemoglobin 11.1 (*)     MCHC 31.4 (*)     Platelets 523 (*)     Lymphocyte % 6.1 (*)     Monocyte % 27.1 (*)     Eosinophil % 0.1 (*)     Neutrophils, Absolute 8.44 (*)     Monocytes, Absolute 3.67 (*)     All other components within normal limits   URINALYSIS W/ MICROSCOPIC IF INDICATED (NO CULTURE) - Normal    Narrative:     Urine microscopic not indicated.   TROPONIN (IN-HOUSE) - Normal    Narrative:     Troponin T Reference Range:  <= 0.03 ng/mL-   Negative for AMI  >0.03 ng/mL-     Abnormal for myocardial necrosis.  Clinicians would have to utilize clinical acumen, EKG, Troponin and serial changes to determine if it is an Acute Myocardial Infarction or myocardial injury due to an underlying chronic condition.       Results may be falsely decreased if patient taking Biotin.     PROCALCITONIN - Normal    Narrative:     As a Marker for Sepsis (Non-Neonates):    1. <0.5 ng/mL represents a low risk of severe sepsis and/or septic shock.  2. >2 ng/mL represents a high risk of severe sepsis and/or septic shock.    As a Marker for Lower Respiratory Tract Infections that require antibiotic therapy:    PCT on Admission    Antibiotic Therapy       6-12 Hrs later    >0.5                Strongly Recommended  >0.25 - <0.5        Recommended   0.1 - 0.25          Discouraged              Remeasure/reassess PCT  <0.1           "      Strongly Discouraged     Remeasure/reassess PCT    As 28 day mortality risk marker: \"Change in Procalcitonin Result\" (>80% or <=80%) if Day 0 (or Day 1) and Day 4 values are available. Refer to http://[email or web address]-pct-calculator.com    Change in PCT <=80%  A decrease of PCT levels below or equal to 80% defines a positive change in PCT test result representing a higher risk for 28-day all-cause mortality of patients diagnosed with severe sepsis for septic shock.    Change in PCT >80%  A decrease of PCT levels of more than 80% defines a negative change in PCT result representing a lower risk for 28-day all-cause mortality of patients diagnosed with severe sepsis or septic shock.      LACTIC ACID, PLASMA - Normal   BLOOD CULTURE   BLOOD CULTURE   POCT GLUCOSE FINGERSTICK   POCT GLUCOSE FINGERSTICK   POCT GLUCOSE FINGERSTICK   POCT GLUCOSE FINGERSTICK   CBC AND DIFFERENTIAL    Narrative:     The following orders were created for panel order CBC & Differential.  Procedure                               Abnormality         Status                     ---------                               -----------         ------                     CBC Auto Differential[[id number]]        Abnormal            Final result                 Please view results for these tests on the individual orders.       EKG:   ECG 12 Lead   Final Result   HEART RATE= 84  bpm   RR Interval= 714  ms   LA Interval= 163  ms   P Horizontal Axis= -7  deg   P Front Axis= 62  deg   QRSD Interval= 72  ms   QT Interval= 476  ms   QRS Axis= 70  deg   T Wave Axis= -26  deg   - ABNORMAL ECG -   Sinus rhythm   Low voltage with right axis deviation   Poor R wave progression   Prolonged QT interval   Compared to the prior EKG, atrial and ventricular ectopy have resolved   Electronically Signed By: Dennis MullerPERRY) 05-Oct-2022 21:53:18   Date and Time of Study: 2022-10-05 20:11:09          Meds given in ED:   Medications   sodium chloride 0.9 % flush 10 mL (10 " mL Intravenous Given 10/6/22 0021)   sodium chloride 0.9 % flush 10 mL (has no administration in time range)   aluminum-magnesium hydroxide-simethicone (MAALOX MAX) 400-400-40 MG/5ML suspension 15 mL (has no administration in time range)   acetaminophen (TYLENOL) tablet 650 mg (has no administration in time range)     Or   acetaminophen (TYLENOL) suppository 650 mg (has no administration in time range)   ondansetron (ZOFRAN) tablet 4 mg (has no administration in time range)     Or   ondansetron (ZOFRAN) injection 4 mg (has no administration in time range)       Imaging results:  CT Head Without Contrast    Result Date: 10/5/2022   1. Parafalcine hemorrhage, favored to be subarachnoid. 2. No acute fracture or subluxation of the spine. 3. Extensive sinus inflammatory changes, concerning for sinusitis. Bilateral mastoid effusions may represent mastoiditis.  FINDINGS were relayed to Salbador Lara at 11:40 PM.  Radiation dose reduction techniques were utilized, including automated exposure control and exposure modulation based on body size.  This report was finalized on 10/5/2022 11:42 PM by Dr. Rocío Schreiber M.D.      CT Cervical Spine Without Contrast    Result Date: 10/5/2022   1. Parafalcine hemorrhage, favored to be subarachnoid. 2. No acute fracture or subluxation of the spine. 3. Extensive sinus inflammatory changes, concerning for sinusitis. Bilateral mastoid effusions may represent mastoiditis.  FINDINGS were relayed to Salbador Lara at 11:40 PM.  Radiation dose reduction techniques were utilized, including automated exposure control and exposure modulation based on body size.  This report was finalized on 10/5/2022 11:42 PM by Dr. Rocío Schreiber M.D.      XR Chest 1 View    Result Date: 10/5/2022  Mild atelectasis or infiltrate at the bases. Cardiomegaly. Tortuous aorta.  This report was finalized on 10/5/2022 8:02 PM by Dr. Toby Lang M.D.        Ambulatory status:   - as tolerated    Social  issues:   Social History     Socioeconomic History   • Marital status: Unknown   Tobacco Use   • Smoking status: Current Every Day Smoker     Packs/day: 0.50     Years: 20.00     Pack years: 10.00     Types: Cigarettes   • Smokeless tobacco: Never Used   Vaping Use   • Vaping Use: Never used   Substance and Sexual Activity   • Alcohol use: Yes     Comment: SOCIALLY    • Drug use: No   • Sexual activity: Defer       NIH Stroke Scale:  Interval: baseline      Wilman Drummond RN  10/06/22 01:51 EDT

## 2022-10-06 NOTE — H&P
.     Admission Note    Patient Identification:  Kennedi Cardenas  82 y.o.  female  1940  [id number]            CC: Fall/subarachnoid hemorrhage    History of Present Illness:  Patient is a 82-year-old who has a recent history of a diagnosis with COVID she was given remdesivir.  She was discharged to nursing home.  She had a fall.  She was brought into the emergency room for evaluation.  She had a new subarachnoid hemorrhage.  Neurosurgery was consulted and asked admit to the ICU for close neurological monitoring.  The patient does use oxygen at her baseline.  Was discharged somewhere around 3 L nasal cannula and according to the patient's son and daughter-in-law.  She had been using this over the preceding months.  She has a relatively new diagnosis of dementia which was originally found after she drove around little for 10 hours and a disorderly/lost manner.  Separately has been undergoing work-up further so the preceding few months or so according to the patient's family.  The patient is intermittently lucid can provide insight at times and other times cannot provide details regarding care.  She denies any chest pain or abdominal pain back pain or leg pain post fall    Reviewed discharge summary from just yesterday she was given a course of remdesivir and Decadron and was treated for urinary tract infection discharge from the hospital yesterday.    She also has had a recent finding of a 2 x 2 left lower lobe nodule status postbiopsy showing a adenocarcinoma of the lung.  She was post have outpatient PET scan but has not been able to get to that step yet secondary to repeat hospital admissions.    Most of the patient's history obtained through chart review and discussion with patient's son and daughter-in-law at bedside    Past Medical History:   Diagnosis Date   • Breast cancer (HCC)     Bilateral   • Cancer (HCC)    • Cataracts, bilateral    • Dementia (HCC)    • Hyperlipidemia    • Hypertension    •  "Hypothyroidism    • Macular degeneration    • Skin cancer     Left axilla, right leg, right cheek   • Uterine prolapse        Past Surgical History:   Procedure Laterality Date   • BREAST BIOPSY Left 2006    Benign   • BREAST LUMPECTOMY Bilateral 2018    Dr. Lily Stevens   • EYE SURGERY     • SKIN CANCER EXCISION      Right cheek, left axilla, right leg   • TOTAL HIP ARTHROPLASTY Left 02/23/2019    Procedure: TOTAL HIP ARTHROPLASTY ANTERIOR;  Surgeon: Magdalena De Los Santos MD;  Location: [facility] OR;  Service: Orthopedics        Social History     Socioeconomic History   • Marital status: Unknown   Tobacco Use   • Smoking status: Current Every Day Smoker     Packs/day: 0.50     Years: 20.00     Pack years: 10.00     Types: Cigarettes   • Smokeless tobacco: Never Used   Vaping Use   • Vaping Use: Never used   Substance and Sexual Activity   • Alcohol use: Yes     Comment: SOCIALLY    • Drug use: No   • Sexual activity: Defer       Family History   Problem Relation Age of Onset   • Hypertension Mother    • Diabetes Mother    • Breast cancer Mother    • Lung cancer Father    • Hyperlipidemia Sister    • Hypertension Brother        (Not in a hospital admission)      Allergies   Allergen Reactions   • Latex, Natural Rubber Rash       Review of Systems:  As per HPI otherwise a 12 point review of systems was obtained as best able given patient's difficulties secondary to dementia    Physical Exam:  Vitals:  Vitals:    10/05/22 1931 10/05/22 2019 10/05/22 2021 10/05/22 2140   BP: 128/74  127/82 131/75   BP Location:   Right arm Left arm   Patient Position:   Sitting Sitting   Pulse: 85  81 86   Resp: 18  18 16   Temp:       SpO2: 97%  97% 93%   Weight:  66.2 kg (146 lb)     Height:  162.6 cm (64\")             Body mass index is 25.06 kg/m².  No intake or output data in the 24 hours ending 10/06/22 0016    Exam:  General appearance: Ill-appearing however pleasantly confused at times and conversant   Eyes: anicteric " sclerae, moist conjunctivae;   HENT: Bandaged at; oropharynx clear with moist mucous membranes  Neck: Trachea midline; supple   Lungs: Bilateral air entry without wheeze or rhonchi, with normal respiratory effort and no intercostal retractions  CV: RRR, no rub  Abdomen: Nonrigid bowel sounds positive  Skin no large areas of ecchymosis noted  Psych/neuro calm affect answers some questions appropriately other times deflects answers that she does not appear to be able to provide moves extremities    Scheduled meds:  sodium chloride, 10 mL, Intravenous, Q12H      Data Review:   I reviewed the patient's medications and new clinical results.  Lab Results   Component Value Date    CALCIUM 9.1 10/05/2022    PHOS 2.7 08/25/2022     Results from last 7 days   Lab Units 10/05/22  2100 10/04/22  0553 10/02/22  0413 09/30/22  0722 09/29/22  0659   AST (SGOT) U/L 31  --   --  28 26   SODIUM mmol/L 141  --  139 141 140   POTASSIUM mmol/L 4.5  --  4.0 4.1 4.1   CHLORIDE mmol/L 98  --  101 102 102   CO2 mmol/L 33.1*  --  32.0* 29.6* 31.2*   BUN mg/dL 13  --  13 12 11   CREATININE mg/dL 0.69  --  0.50* 0.50* 0.48*   GLUCOSE mg/dL 106*  --  93 80 90   CALCIUM mg/dL 9.1  --  8.5* 8.8 8.3*   WBC 10*3/mm3 13.54*  --   --  7.89 7.81   HEMOGLOBIN g/dL 11.1*  --   --  10.4* 9.6*   PLATELETS 10*3/mm3 523* 558*  --  611* 545*   ALT (SGPT) U/L 24  --   --  23 20     Results from last 7 days   Lab Units 10/05/22  2100   TROPONIN T ng/mL <0.010         Estimated Creatinine Clearance: 59.9 mL/min (by C-G formula based on SCr of 0.69 mg/dL).    IMAGING:  I have reviewed all imaging studies since my last documentation.  Imaging Results (Most Recent)     Procedure Component Value Units Date/Time    CT Head Without Contrast [[id number]] Collected: 10/05/22 2329     Updated: 10/05/22 2345    Narrative:      CT HEAD WITHOUT CONTRAST; CT OF THE CERVICAL SPINE     HISTORY: Fall     COMPARISON: 08/24/2022     TECHNIQUE: Axial CT imaging was obtained  through the brain. No IV  contrast was administered. Axial CT imaging was obtained through the  cervical spine. Coronal and sagittal reformatted images were obtained.     FINDINGS:  CT HEAD: The patient is noted to have hemorrhage in a parafalcine  location. However, as it appears to be interspersed within the sulci,  the appearance is more suggestive of subarachnoid hemorrhage, rather  than subdural. There is no midline shift or mass effect. The patient  does have diffuse atrophy. There is extensive periventricular and deep  white matter microangiopathic change. The patient has near complete  opacification of the paranasal sinuses, with air-fluid levels noted  within the maxillary and sphenoid sinuses. Appearance is concerning for  sinusitis. 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 There is additional partial opacification within the  mastoid air cells, new when compared to prior exam, and correlation with  any evidence of mastoiditis is suggested. No calvarial fracture is seen.     CT CERVICAL SPINE: No acute fracture or subluxation of the cervical  spine is seen. There is mild retrolisthesis of C4 on C5, with additional  retrolisthesis of C5 on C6 noted. Intervertebral disc space narrowing is  most significant at C5-C6. These findings are stable when compared to  the prior study. Patient has biapical scarring. There is no prevertebral  soft tissue swelling.     C2-C3: There is no canal stenosis or neural foraminal narrowing.  C3-C4: There is mild canal narrowing. There is mild neural foraminal  narrowing on the left.  C4-C5: Moderate canal stenosis. There is severe neural foraminal  narrowing on the left.  C5-C6: There is moderate canal stenosis. There is bilateral neural  foraminal narrowing.  C6-C7: There is no significant stenosis. There is bilateral neural  foraminal narrowing.  C7-T1: There is no canal stenosis or neural foraminal narrowing.       Impression:         1.  Parafalcine hemorrhage, favored to be subarachnoid.  2. No acute fracture or subluxation of the spine.  3. Extensive sinus inflammatory changes, concerning for sinusitis.  Bilateral mastoid effusions may represent mastoiditis.     FINDINGS were relayed to Salbador Lara at 11:40 PM.     Radiation dose reduction techniques were utilized, including automated  exposure control and exposure modulation based on body size.     This report was finalized on 10/5/2022 11:42 PM by Dr. Rocío Schreiber M.D.       CT Cervical Spine Without Contrast [[id number]] Collected: 10/05/22 2329     Updated: 10/05/22 2345    Narrative:      CT HEAD WITHOUT CONTRAST; CT OF THE CERVICAL SPINE     HISTORY: Fall     COMPARISON: 08/24/2022     TECHNIQUE: Axial CT imaging was obtained through the brain. No IV  contrast was administered. Axial CT imaging was obtained through the  cervical spine. Coronal and sagittal reformatted images were obtained.     FINDINGS:  CT HEAD: The patient is noted to have hemorrhage in a parafalcine  location. However, as it appears to be interspersed within the sulci,  the appearance is more suggestive of subarachnoid hemorrhage, rather  than subdural. There is no midline shift or mass effect. The patient  does have diffuse atrophy. There is extensive periventricular and deep  white matter microangiopathic change. The patient has near complete  opacification of the paranasal sinuses, with air-fluid levels noted  within the maxillary and sphenoid sinuses. Appearance is concerning for  sinusitis. 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 There is additional partial opacification within the  mastoid air cells, new when compared to prior exam, and correlation with  any evidence of mastoiditis is suggested. No calvarial fracture is seen.     CT CERVICAL SPINE: No acute fracture or subluxation of the cervical  spine is seen. There is mild retrolisthesis of C4 on C5, with  additional  retrolisthesis of C5 on C6 noted. Intervertebral disc space narrowing is  most significant at C5-C6. These findings are stable when compared to  the prior study. Patient has biapical scarring. There is no prevertebral  soft tissue swelling.     C2-C3: There is no canal stenosis or neural foraminal narrowing.  C3-C4: There is mild canal narrowing. There is mild neural foraminal  narrowing on the left.  C4-C5: Moderate canal stenosis. There is severe neural foraminal  narrowing on the left.  C5-C6: There is moderate canal stenosis. There is bilateral neural  foraminal narrowing.  C6-C7: There is no significant stenosis. There is bilateral neural  foraminal narrowing.  C7-T1: There is no canal stenosis or neural foraminal narrowing.       Impression:         1. Parafalcine hemorrhage, favored to be subarachnoid.  2. No acute fracture or subluxation of the spine.  3. Extensive sinus inflammatory changes, concerning for sinusitis.  Bilateral mastoid effusions may represent mastoiditis.     FINDINGS were relayed to Salbador Lara at 11:40 PM.     Radiation dose reduction techniques were utilized, including automated  exposure control and exposure modulation based on body size.     This report was finalized on 10/5/2022 11:42 PM by Dr. Rocío Schreiber M.D.       XR Chest 1 View [[id number]] Collected: 10/05/22 1959     Updated: 10/05/22 2005    Narrative:      XR CHEST 1 VW-     HISTORY: Female who is 82 years-old,  weakness, lung cancer     TECHNIQUE: Frontal views of the chest     COMPARISON: 9/25/2022     FINDINGS: Patient is rotated towards the right. The heart is enlarged.  Aorta is tortuous. Old granulomatous disease is apparent. A previous CT  demonstrated stellate tumor in the left lower lobe at the lung is not  well distinguished on this exam. Minimal atelectasis or infiltrate at  the bases. No pleural effusion, or pneumothorax. No acute osseous  process.       Impression:      Mild atelectasis or  infiltrate at the bases. Cardiomegaly.  Tortuous aorta.     This report was finalized on 10/5/2022 8:02 PM by Dr. Toby Lang M.D.             ASSESSMENT /   PLAN:  [facility] - neurosurgery managing, consult placed, Er contacted  COVID - tested positive over 10 days ago with symptoms onset further back and has received remdesivir course last time she was here  Chronic hypoxemic respiratory failure-we will continue Decadron course for now  Lung adenocarcinoma-stage unknown PET scan ordered as an outpatient  Vascular dementia  Tobacco abuse  Hypothyroidism  Hypertension hyperlipidemia    Admit to the ICU for frequent neurological checks CT imaging per neurosurgery's recommendations    Total critical care time was 45 minutes, excluding any separately billable procedure time.    Izaiah Anders MD  [facility]  10/06/22  03:11 EDT

## 2022-10-07 ENCOUNTER — APPOINTMENT (OUTPATIENT)
Dept: CT IMAGING | Facility: HOSPITAL | Age: 82
End: 2022-10-07

## 2022-10-07 LAB
ALBUMIN SERPL-MCNC: 3.4 G/DL (ref 3.5–5.2)
ALBUMIN/GLOB SERPL: 1.3 G/DL
ALP SERPL-CCNC: 60 U/L (ref 39–117)
ALT SERPL W P-5'-P-CCNC: 19 U/L (ref 1–33)
ANION GAP SERPL CALCULATED.3IONS-SCNC: 9.8 MMOL/L (ref 5–15)
AST SERPL-CCNC: 23 U/L (ref 1–32)
BASOPHILS # BLD AUTO: 0.02 10*3/MM3 (ref 0–0.2)
BASOPHILS NFR BLD AUTO: 0.2 % (ref 0–1.5)
BILIRUB SERPL-MCNC: 0.5 MG/DL (ref 0–1.2)
BUN SERPL-MCNC: 12 MG/DL (ref 8–23)
BUN/CREAT SERPL: 22.2 (ref 7–25)
CALCIUM SPEC-SCNC: 8.9 MG/DL (ref 8.6–10.5)
CHLORIDE SERPL-SCNC: 97 MMOL/L (ref 98–107)
CO2 SERPL-SCNC: 28.2 MMOL/L (ref 22–29)
CREAT SERPL-MCNC: 0.54 MG/DL (ref 0.57–1)
DEPRECATED RDW RBC AUTO: 50.2 FL (ref 37–54)
EGFRCR SERPLBLD CKD-EPI 2021: 92.6 ML/MIN/1.73
EOSINOPHIL # BLD AUTO: 0.01 10*3/MM3 (ref 0–0.4)
EOSINOPHIL NFR BLD AUTO: 0.1 % (ref 0.3–6.2)
ERYTHROCYTE [DISTWIDTH] IN BLOOD BY AUTOMATED COUNT: 14.6 % (ref 12.3–15.4)
GLOBULIN UR ELPH-MCNC: 2.7 GM/DL
GLUCOSE BLDC GLUCOMTR-MCNC: 115 MG/DL (ref 70–130)
GLUCOSE BLDC GLUCOMTR-MCNC: 118 MG/DL (ref 70–130)
GLUCOSE BLDC GLUCOMTR-MCNC: 125 MG/DL (ref 70–130)
GLUCOSE BLDC GLUCOMTR-MCNC: 144 MG/DL (ref 70–130)
GLUCOSE SERPL-MCNC: 96 MG/DL (ref 65–99)
HCT VFR BLD AUTO: 30.8 % (ref 34–46.6)
HCT VFR BLD AUTO: 31.9 % (ref 34–46.6)
HGB BLD-MCNC: 10.5 G/DL (ref 12–15.9)
HGB BLD-MCNC: 9.9 G/DL (ref 12–15.9)
IMM GRANULOCYTES # BLD AUTO: 0.27 10*3/MM3 (ref 0–0.05)
IMM GRANULOCYTES NFR BLD AUTO: 2.8 % (ref 0–0.5)
LYMPHOCYTES # BLD AUTO: 0.34 10*3/MM3 (ref 0.7–3.1)
LYMPHOCYTES NFR BLD AUTO: 3.5 % (ref 19.6–45.3)
MCH RBC QN AUTO: 30.2 PG (ref 26.6–33)
MCHC RBC AUTO-ENTMCNC: 32.1 G/DL (ref 31.5–35.7)
MCV RBC AUTO: 93.9 FL (ref 79–97)
MONOCYTES # BLD AUTO: 1.26 10*3/MM3 (ref 0.1–0.9)
MONOCYTES NFR BLD AUTO: 13 % (ref 5–12)
NEUTROPHILS NFR BLD AUTO: 7.76 10*3/MM3 (ref 1.7–7)
NEUTROPHILS NFR BLD AUTO: 80.4 % (ref 42.7–76)
NRBC BLD AUTO-RTO: 0.1 /100 WBC (ref 0–0.2)
PLATELET # BLD AUTO: 373 10*3/MM3 (ref 140–450)
PMV BLD AUTO: 9.7 FL (ref 6–12)
POTASSIUM SERPL-SCNC: 4.2 MMOL/L (ref 3.5–5.2)
PROT SERPL-MCNC: 6.1 G/DL (ref 6–8.5)
RBC # BLD AUTO: 3.28 10*6/MM3 (ref 3.77–5.28)
SODIUM SERPL-SCNC: 135 MMOL/L (ref 136–145)
SODIUM SERPL-SCNC: 137 MMOL/L (ref 136–145)
WBC NRBC COR # BLD: 9.66 10*3/MM3 (ref 3.4–10.8)

## 2022-10-07 PROCEDURE — 85018 HEMOGLOBIN: CPT | Performed by: INTERNAL MEDICINE

## 2022-10-07 PROCEDURE — 94760 N-INVAS EAR/PLS OXIMETRY 1: CPT

## 2022-10-07 PROCEDURE — 94799 UNLISTED PULMONARY SVC/PX: CPT

## 2022-10-07 PROCEDURE — 85014 HEMATOCRIT: CPT | Performed by: INTERNAL MEDICINE

## 2022-10-07 PROCEDURE — 70450 CT HEAD/BRAIN W/O DYE: CPT

## 2022-10-07 PROCEDURE — 94761 N-INVAS EAR/PLS OXIMETRY MLT: CPT

## 2022-10-07 PROCEDURE — 84295 ASSAY OF SERUM SODIUM: CPT

## 2022-10-07 PROCEDURE — 80053 COMPREHEN METABOLIC PANEL: CPT | Performed by: INTERNAL MEDICINE

## 2022-10-07 PROCEDURE — 82962 GLUCOSE BLOOD TEST: CPT

## 2022-10-07 PROCEDURE — 99231 SBSQ HOSP IP/OBS SF/LOW 25: CPT | Performed by: NEUROLOGICAL SURGERY

## 2022-10-07 PROCEDURE — 94664 DEMO&/EVAL PT USE INHALER: CPT

## 2022-10-07 PROCEDURE — 85025 COMPLETE CBC W/AUTO DIFF WBC: CPT | Performed by: INTERNAL MEDICINE

## 2022-10-07 PROCEDURE — 25010000002 DEXAMETHASONE PER 1 MG: Performed by: INTERNAL MEDICINE

## 2022-10-07 RX ORDER — CHLORHEXIDINE GLUCONATE 0.12 MG/ML
15 RINSE ORAL EVERY 12 HOURS SCHEDULED
Status: DISCONTINUED | OUTPATIENT
Start: 2022-10-07 | End: 2022-10-07

## 2022-10-07 RX ORDER — FAMOTIDINE 10 MG/ML
20 INJECTION, SOLUTION INTRAVENOUS 2 TIMES DAILY
Status: DISCONTINUED | OUTPATIENT
Start: 2022-10-07 | End: 2022-10-07

## 2022-10-07 RX ORDER — DONEPEZIL HYDROCHLORIDE 5 MG/1
5 TABLET, FILM COATED ORAL NIGHTLY
Status: DISCONTINUED | OUTPATIENT
Start: 2022-10-07 | End: 2022-10-13 | Stop reason: HOSPADM

## 2022-10-07 RX ADMIN — Medication 10 ML: at 09:00

## 2022-10-07 RX ADMIN — LEVOTHYROXINE SODIUM 125 MCG: 0.12 TABLET ORAL at 06:32

## 2022-10-07 RX ADMIN — BUDESONIDE AND FORMOTEROL FUMARATE DIHYDRATE 2 PUFF: 160; 4.5 AEROSOL RESPIRATORY (INHALATION) at 07:43

## 2022-10-07 RX ADMIN — LETROZOLE 2.5 MG: 2.5 TABLET, FILM COATED ORAL at 18:30

## 2022-10-07 NOTE — PLAN OF CARE
Goal Outcome Evaluation:  Plan of Care Reviewed With: patient            VSS, pt oriented to self/time/place with intermittent confusion, NIH2, at times uncooperative but easily able to be redirected, much more lethargic today and takes a while to arouse (zyprexa started), see AM CT/labs

## 2022-10-07 NOTE — PROGRESS NOTES
"  PROGRESS NOTE  Patient Name: Kennedi Cardenas  Age/Sex: 82 y.o. female  : 1940  MRN: [id number]    Date of Admission: 10/5/2022  Date of Encounter Visit: 10/07/22   LOS: 1 day   Patient Care Team:  Duncan Garcia MD as PCP - General (General Practice)  Case, Madison Leung MD as Consulting Physician (Obstetrics and Gynecology)  Duncan Brian MD as Referring Physician (Hospitalist)  Jamila Brandt MD as Consulting Physician (Hematology and Oncology)    Chief Complaint: Restlessness and confusion, subarachnoid hemorrhage, dementia    Hospital course: Patient was up all night, she has been sleeping most of the day.  Patient is easily irritable and did not want to interact with the examiner because of that.  She is afebrile, she is hemodynamically stable, she is on nasal cannula oxygen with good compensation.  She refused to follow commands except when promised that I would leave her alone she did follow all instructions.  She is refusing her medication        REVIEW OF SYSTEMS:   CONSTITUTIONAL: no fever or chills  Otherwise limited system review given her withdrawal and lack of interaction  Ventilator/Non-Invasive Ventilation Settings (From admission, onward)            6 L/min nasal cannula oxygen            Vital Signs  Temp:  [96.9 °F (36.1 °C)-98.8 °F (37.1 °C)] 98.4 °F (36.9 °C)  Heart Rate:  [] 75  Resp:  [16-18] 16  BP: ()/(52-78) 146/73  SpO2:  [81 %-100 %] 100 %  on  Flow (L/min):  [6-8] 6 Device (Oxygen Therapy): humidified;high-flow nasal cannula    Intake/Output Summary (Last 24 hours) at 10/7/2022 1427  Last data filed at 10/7/2022 0500  Gross per 24 hour   Intake 320 ml   Output 1000 ml   Net -680 ml     Flowsheet Rows    Flowsheet Row First Filed Value   Admission Height 162.6 cm (64\") Documented at 10/05/2022 2019   Admission Weight 66.2 kg (146 lb) Documented at 10/05/2022 2019        Body mass index is 24.07 kg/m².      10/05/22  2019 10/06/22  0300   Weight: 66.2 kg (146 " lb) 63.6 kg (140 lb 3.4 oz)       Physical Exam:  GEN: Patient is asleep but arousable, did not want to interact with the examiner with very poor eye contact however she did follow commands when she was prompted with the examiner and had no  EYES:   Sclerae clear. No icterus. PERRL. Normal EOM  ENT:   External ears/nose normal, refused to open her mouth for examination, on nasal cannula oxygen  NECK:  Supple, midline trachea, no JVD  LUNGS: Normal chest on inspection, CTAB, no wheezes. No rhonchi. No crackles. Respirations regular, even and unlabored.  Shallow breathing  CV:  Regular rhythm and rate. Normal S1/S2. No murmurs, gallops, or rubs noted.  ABD:  Soft, nontender and nondistended. Normal bowel sounds. No guarding  EXT: She was able to move both upper lower extremities. No cyanosis. No redness. No edema.   Skin: Dry, intact, no bleeding    Results Review:    Results From Last 14 Days   Lab Units 10/05/22  2100 10/02/22  0413 09/25/22  1939 09/25/22  1516   CRP mg/dL  --  2.08*  --   --    D DIMER QUANT MCGFEU/mL  --   --   --  1.44*   LACTATE mmol/L 1.4  --  1.0  --      Results from last 7 days   Lab Units 10/07/22  1326 10/07/22  0520 10/07/22  0025 10/06/22  1252 10/06/22  0558 10/05/22  2100 10/02/22  0413   SODIUM mmol/L 137 135* 135*  135* 136 136 141 139   POTASSIUM mmol/L  --   --  4.2  --   --  4.5 4.0   CHLORIDE mmol/L  --   --  97*  --   --  98 101   CO2 mmol/L  --   --  28.2  --   --  33.1* 32.0*   BUN mg/dL  --   --  12  --   --  13 13   CREATININE mg/dL  --   --  0.54*  --   --  0.69 0.50*   CALCIUM mg/dL  --   --  8.9  --   --  9.1 8.5*   AST (SGOT) U/L  --   --  23  --   --  31  --    ALT (SGPT) U/L  --   --  19  --   --  24  --    ANION GAP mmol/L  --   --  9.8  --   --  9.9 6.0   ALBUMIN g/dL  --   --  3.40*  --   --  4.00  --      Results from last 7 days   Lab Units 10/05/22  2100   TROPONIN T ng/mL <0.010             Results from last 7 days   Lab Units 10/07/22  1326 10/07/22  0520  10/05/22  2100 10/04/22  0553   WBC 10*3/mm3  --  9.66 13.54*  --    HEMOGLOBIN g/dL 10.5* 9.9* 11.1*  --    HEMATOCRIT % 31.9* 30.8* 35.3  --    PLATELETS 10*3/mm3  --  373 523* 558*   MCV fL  --  93.9 95.9  --    NEUTROPHIL % %  --  80.4* 62.4  --    LYMPHOCYTE % %  --  3.5* 6.1*  --    MONOCYTES % %  --  13.0* 27.1*  --    EOSINOPHIL % %  --  0.1* 0.1*  --    BASOPHIL % %  --  0.2 0.1  --    IMM GRAN % %  --  2.8*  --   --                    Invalid input(s): LDLCALC          Glucose   Date/Time Value Ref Range Status   10/07/2022 1418 125 70 - 130 mg/dL Final     Comment:     Meter: [id number] : [email or web address] Elo Garland RN   10/07/2022 0519 144 (H) 70 - 130 mg/dL Final     Comment:     Meter: [id number] : [id number] Parag Melara REY   10/06/2022 2359 118 70 - 130 mg/dL Final     Comment:     Meter: [id number] : [id number] Rina Bob RN   10/06/2022 1629 101 70 - 130 mg/dL Final     Comment:     Meter: [id number] : [id number] EssexRoseliaEdgarjeana Ashraf REY   10/06/2022 0308 94 70 - 130 mg/dL Final     Comment:     Meter: [id number] : meri Wadsworth RN     Results from last 7 days   Lab Units 10/05/22  2100   PROCALCITONIN ng/mL 0.07   LACTATE mmol/L 1.4     Results from last 7 days   Lab Units 10/05/22  2109 10/05/22  2100   BLOODCX  No growth at 24 hours No growth at 24 hours     Results from last 7 days   Lab Units 10/05/22  2141   NITRITE UA  Negative     Results from last 7 days   Lab Units 10/05/22  2141   COVID19  Detected*   ADENOVIRUS DETECTION BY PCR  Not Detected   CORONAVIRUS 229E  Not Detected   CORONAVIRUS HKU1  Not Detected   CORONAVIRUS NL63  Not Detected   CORONAVIRUS OC43  Not Detected   HUMAN METAPNEUMOVIRUS  Not Detected   HUMAN RHINOVIRUS/ENTEROVIRUS  Not Detected   INFLUENZA B PCR  Not Detected   PARAINFLUENZA 1  Not Detected   PARAINFLUENZA VIRUS 2  Not Detected   PARAINFLUENZA VIRUS 3  Not Detected   PARAINFLUENZA VIRUS 4  Not Detected   BORDETELLA  PERTUSSIS PCR  Not Detected   BORDETELLA PARAPERTUSSIS PCR  Not Detected   CHLAMYDOPHILA PNEUMONIAE PCR  Not Detected   MYCOPLAMA PNEUMO PCR  Not Detected   RSV, PCR  Not Detected               Imaging:   Imaging Results (All)     Procedure Component Value Units Date/Time    CT Head Without Contrast [[id number]] Collected: 10/07/22 0722     Updated: 10/07/22 0844    Narrative:      CT OF THE BRAIN WITHOUT CONTRAST 10/07/2022            Impression:      1. Small amount of hyperdense hemorrhage along the falx as described.  This appears slightly improved from the previous study of 10/05/2022.  2. Tiny amount of blood is seen layering in the dependent portion of  both occipital lobes, not clearly seen on the previous study. There may  be a punctate focus of subarachnoid hemorrhage in the left posterior  parietal lobe as well.  3. No evidence of acute infarction or midline shift.  4. Extensive pansinusitis.     Radiation dose reduction techniques were utilized, including automated  exposure control and exposure modulation based on body size.     This report was finalized on 10/7/2022 8:40 AM by Dr. Jerry Brasher M.D.          I reviewed the patient's new clinical results.  I personally viewed and interpreted the patient's imaging results:        Medication Review:   atorvastatin, 20 mg, Oral, Daily  budesonide-formoterol, 2 puff, Inhalation, BID - RT  donepezil, 5 mg, Oral, Nightly  letrozole, 2.5 mg, Oral, Daily  levothyroxine, 125 mcg, Oral, Q AM  OLANZapine, 5 mg, Oral, BID  sodium chloride, 10 mL, Intravenous, Q12H             ASSESSMENT:   Traumatic parafalcine subarachnoid hemorrhage  Asymptomatic COVID-19 infection, on enhanced isolation  Dementia  Thrombocytopenia    PLAN:  Glycemic control is satisfactory, hemoglobin is stable, platelet count is stable, sodium is normal, otherwise normal liver enzymes and renal function.  Patient is afebrile  She had some problem with confusion last night she is more sleepy  today.  Another finding on the CT is positive sinusitis  Continue neuro monitoring per neurosurgery and will follow the recommendation  No need to actively treat from the COVID standpoint patient is asymptomatic  We will hold on the nasogastric tube and hope the patient will be agreeable to take her medication later today since the nasogastric tube will cause more irritation and the need for restraints.    Discussed with ICU rounding team    Disposition:     Darlin Eddy MD  10/07/22  14:27 EDT        Dictated utilizing Dragon dictation

## 2022-10-08 LAB — GLUCOSE BLDC GLUCOMTR-MCNC: 93 MG/DL (ref 70–130)

## 2022-10-08 PROCEDURE — 94761 N-INVAS EAR/PLS OXIMETRY MLT: CPT

## 2022-10-08 PROCEDURE — 94664 DEMO&/EVAL PT USE INHALER: CPT

## 2022-10-08 PROCEDURE — 94799 UNLISTED PULMONARY SVC/PX: CPT

## 2022-10-08 PROCEDURE — 82962 GLUCOSE BLOOD TEST: CPT

## 2022-10-08 PROCEDURE — 94760 N-INVAS EAR/PLS OXIMETRY 1: CPT

## 2022-10-08 RX ADMIN — OLANZAPINE 5 MG: 5 TABLET ORAL at 20:12

## 2022-10-08 RX ADMIN — DONEPEZIL HYDROCHLORIDE 5 MG: 5 TABLET, FILM COATED ORAL at 20:12

## 2022-10-08 RX ADMIN — ATORVASTATIN CALCIUM 20 MG: 20 TABLET, FILM COATED ORAL at 10:00

## 2022-10-08 RX ADMIN — Medication 10 ML: at 10:00

## 2022-10-08 RX ADMIN — Medication 10 ML: at 20:12

## 2022-10-08 RX ADMIN — LETROZOLE 2.5 MG: 2.5 TABLET, FILM COATED ORAL at 10:00

## 2022-10-08 RX ADMIN — BUDESONIDE AND FORMOTEROL FUMARATE DIHYDRATE 2 PUFF: 160; 4.5 AEROSOL RESPIRATORY (INHALATION) at 19:26

## 2022-10-08 RX ADMIN — BUDESONIDE AND FORMOTEROL FUMARATE DIHYDRATE 1 PUFF: 160; 4.5 AEROSOL RESPIRATORY (INHALATION) at 06:54

## 2022-10-08 RX ADMIN — OLANZAPINE 5 MG: 5 TABLET ORAL at 10:00

## 2022-10-08 NOTE — NURSING NOTE
CCP called updated patient status improved, plan is to return to [facility] tomorrow per neurosurgery if remains stable per other Healthcare providers.  Paperwork and transportation to be followed up by CCP.  Primary RN updated.  CGressRN

## 2022-10-08 NOTE — CASE MANAGEMENT/SOCIAL WORK
Continued Stay Note  [facility]     Patient Name: Kennedi Cardenas  MRN: [id number]  Today's Date: 10/8/2022    Admit Date: 10/5/2022        Discharge Plan     Row Name 10/08/22 5180       Plan    Plan Comments CCP spoke with patient's son, Bradford [phone]), who states plan is to return to [facility] at discharge. CCP left [facility] for [facility]/Signature ([phone]) to check bed status, ability to return, and if pre cert needed. Patient will need transportation and a packet made. CCP to follow. Norma GARCÍA RN CCP               Discharge Codes    No documentation.                     Norma Schrader RN

## 2022-10-08 NOTE — DISCHARGE PLACEMENT REQUEST
"Nasreen Cardenas (82 y.o. Female)     Date of Birth   1940    Social Security Number       Address   [address] [address]    Home Phone   [phone]    MRN   [id number]       Sabianism   Unknown    Marital Status   Unknown                            Admission Date   10/5/22    Admission Type   Emergency    Admitting Provider   Izaiah Anders MD    Attending Provider   Darlin Eddy MD    Department, Room/Bed   [facility] INTENSIVE CARE, I387/1       Discharge Date       Discharge Disposition       Discharge Destination                               Attending Provider: Darlin Eddy MD    Allergies: Latex, Natural Rubber    Isolation: Enh Drop/Con   Infection: COVID (confirmed) (09/25/22)   Code Status: Prior    Ht: 162.6 cm (64\")   Wt: 63.6 kg (140 lb 3.4 oz)    Admission Cmt: None   Principal Problem: None                Active Insurance as of 10/5/2022     Primary Coverage     Payor Plan Insurance Group Employer/Plan Group    HUMANA MEDICARE REPLACEMENT HUMANA MEDICARE REPLACEMENT [id number]     Payor Plan Address Payor Plan Phone Number Payor Plan Fax Number Effective Dates    [address] [address] [phone]  1/1/2018 - None Entered    [facility] [phone]       Subscriber Name Subscriber Birth Date Member ID       NASREEN CARDENAS 1940 [id number]                 Emergency Contacts      (Rel.) Home Phone Work Phone Mobile Phone    Jerry Cardenas (Son) -- [phone]            {Outbreak/Travel/Exposure Documentation......;  Question Available Choices Patient Response   COVID-19 Outbreak Screen:  Do you currently have a new onset of the following symptoms?        Fever/Chills, Cough, Shortness of air, Loss of taste or smell, No, Unknown  (!) Cough (10/05/22 1910)   COVID-19 Outbreak Screen: In the last 14 days, have you had contact with anyone who is ill, has show any of the symptoms listed above and/or has been diagnosis with " the 2019 Novel Coronavirus? This includes any immediate household members but excludes any patients with whom you have been in contact within your normal work duties wearing proper PPE, if you are a healthcare worker.  Yes, No, Unknown              No (10/05/22 1910)   COVID-19 Outbreak Screen: Who was notified? Free text (not recorded)   Ebola Screening Outbreak Screen: Have you traveled to the [place] the [place] or [place] within the past 21 days?  Yes, No, Unknown (not recorded)   Ebola Screening Outbreak Screen: Do you have ANY of the following symptoms: Fever/Chills, Vomiting, Diarrhea, Fatigue, Headache, Muscle pain, Unexplained bleeding, Abdominal (stomach) pain, No, Unknown (not recorded)   Ebola Screening Outbreak Screen: Name of Person notified Free text (not recorded)   Travel Screen: Have you traveled in the last month? If so, to what country have you traveled? If [place] what state? Yes, No, Unknown  List of all countries  List of all States No (10/05/22 1910)  (not recorded)  (not recorded)   Infection Risk: Do you currently have the following symptoms?  (If cough is selected, the Tuberculosis Screen is performed.) Cough, Fever, Rash, No (!) Cough (10/05/22 1910)   Tuberculosis Screen: Do you have any of the following Tuberculosis Risks?  · Have you lived or spent time with anyone who had or may have TB?  · Have you lived in or visited any of the following areas for more than one month: [place], [place], [place], [place] or [place], the [place] or [place]?  · Do you have HIV/AIDS?  · Have you lived in or worked in a nursing home, homeless shelter, correctional facility, or substance abuse treatment facility?   · No    If Yes do you have any of the following symptoms? Yes responses display to the right    If Yes, symptoms listed are:  Cough greater than or equal to 3 weeks, Loss of appetite, Unexplained weight loss, Night sweats, Bloody sputum or hemoptysis, Hoarseness, Fever, Fatigue,  Chest pain, No (not recorded)  (not recorded)   Exposure Screen: Have you been exposed to any of these contagious diseases in the last month? Measles, Chickenpox, Meningitis, Pertussis, Whooping Cough, No No (10/05/22 1910)

## 2022-10-08 NOTE — PROGRESS NOTES
Neurosurgery progress note    Post trauma day #2 s/p ground-level fall with a small traumatic subarachnoid hemorrhage in the interhemispheric fissure    Subjective: No events reported over the past 24 hours.      Objective:  Vitals:    10/07/22 2001 10/07/22 2003 10/07/22 2039 10/07/22 2129   BP: 103/53      Pulse:  69  75   Resp:       Temp:   98 °F (36.7 °C)    TempSrc:   Oral    SpO2:  100%  99%   Weight:       Height:           Physical exam  Severe dementia at baseline  Opens eyes to voice  Currently avoiding following commands and indicating that I am making her mad and telling me to leave her alone  Speech is clear  Face is symmetric  Pupils are equal and reactive to light  Moves all 4 extremities well with no obvious focal deficits      Assessment/plan  82-year-old female posttrauma day #2 s/p ground-level fall with a traumatic subarachnoid hemorrhage  -She appears to be at her baseline.  She has severe dementia at baseline and at times will answer questions and participate with the exam.  Currently she is not elevated in telling me to go away, but speaking clearly without focal neurologic deficits  -I reviewed the follow-up CT head from today and there is no further expansion with the traumatic subarachnoid hemorrhage.  The hemorrhage is small without any significant mass-effect and would be expected to be asymptomatic  -Okay from a neurosurgery standpoint to transfer to the floor or discharged to her facility when determined to be stable by her other healthcare providers  -No further neurosurgical intervention indicated at this time.  We will sign off for now.  Please call with questions or concerns

## 2022-10-08 NOTE — PROGRESS NOTES
"  Daily Progress Note.   [facility] INTENSIVE CARE  10/8/2022    Patient:  Name:  Kennedi Cardenas  MRN:  [id number]  1940  82 y.o.  female         CC: sah    Interval History:  Afebrile no increased oxygen reqirements  No acute events  Awake alert answers quietly but appropriately. No cp no soa.        Physical Exam:  BP (!) 88/46   Pulse 76   Temp 98 °F (36.7 °C) (Oral)   Resp 20   Ht 162.6 cm (64\")   Wt 63.6 kg (140 lb 3.4 oz)   SpO2 95%   BMI 24.07 kg/m²   Body mass index is 24.07 kg/m².    Intake/Output Summary (Last 24 hours) at 10/8/2022 1358  Last data filed at 10/8/2022 0500  Gross per 24 hour   Intake 0 ml   Output 800 ml   Net -800 ml     General appearance: ill, conversant   Eyes: anicteric sclerae, moist conjunctivae; no lid-lag;    HENT: Atraumatic; oropharynx clear with moist mucous membranes and no mucosal ulcerations; normal hard and soft palate  Neck: Trachea midline;  supple   Lungs: CTA, with normal respiratory effort and no intercostal retractions  CV: RRR, no MRGs   Abdomen: Soft, non-tender;   Skin: WWP no diffuse visible rash  Psych/neuro: calm affect, alert  Speech intact moves exts       Data Review:  Notable Labs:  Results from last 7 days   Lab Units 10/07/22  1326 10/07/22  0520 10/05/22  2100 10/04/22  0553   WBC 10*3/mm3  --  9.66 13.54*  --    HEMOGLOBIN g/dL 10.5* 9.9* 11.1*  --    PLATELETS 10*3/mm3  --  373 523* 558*     Results from last 7 days   Lab Units 10/07/22  1326 10/07/22  0520 10/07/22  0025 10/06/22  1252 10/06/22  0558 10/05/22  2100 10/02/22  0413   SODIUM mmol/L 137 135* 135*  135* 136 136 141 139   POTASSIUM mmol/L  --   --  4.2  --   --  4.5 4.0   CHLORIDE mmol/L  --   --  97*  --   --  98 101   CO2 mmol/L  --   --  28.2  --   --  33.1* 32.0*   BUN mg/dL  --   --  12  --   --  13 13   CREATININE mg/dL  --   --  0.54*  --   --  0.69 0.50*   GLUCOSE mg/dL  --   --  96  --   --  106* 93   CALCIUM mg/dL  --   --  8.9  --   --  9.1 8.5* "   Estimated Creatinine Clearance: 82 mL/min (A) (by C-G formula based on SCr of 0.54 mg/dL (L)).    Results from last 7 days   Lab Units 10/07/22  0520 10/07/22  0025 10/05/22  2100 10/04/22  0553 10/02/22  0413   AST (SGOT) U/L  --  23 31  --   --    ALT (SGPT) U/L  --  19 24  --   --    PROCALCITONIN ng/mL  --   --  0.07  --   --    LACTATE mmol/L  --   --  1.4  --   --    CRP mg/dL  --   --   --   --  2.08*   PLATELETS 10*3/mm3 373  --  523* 558*  --              Imaging:  Reviewed chest images personally from past 3 days    ASSESSMENT  /  PLAN:  Traumatic parafalcine subarachnoid hemorrhage  Asymptomatic COVID-19 infection, on enhanced isolation - has already completed remdesivir and decadron course on prior admission  Dementia  Chronic hypoxemic resp failure  Lung adenocarcinoma - unwon stage pet ordered asoutpt  Vascular dementia  Tobacco abuse  Hypothyroidism  Hypertension hyperlipidemia       Okay from a neurosurgery standpoint to transfer to the floor or discharged to her facility when determined to be stable by her other healthcare providers  -No further neurosurgical intervention indicated at this time.  We will sign off for now.  Please call with questions or concerns  Repeat cbc in [facility] in am  Will work for discharge - plan for tomorrow.  ptot    Izaiah Anders MD  [facility]  10/08/22  13:58 EDT

## 2022-10-09 ENCOUNTER — APPOINTMENT (OUTPATIENT)
Dept: CT IMAGING | Facility: HOSPITAL | Age: 82
End: 2022-10-09

## 2022-10-09 LAB
ALBUMIN SERPL-MCNC: 3.2 G/DL (ref 3.5–5.2)
ALBUMIN/GLOB SERPL: 1.1 G/DL
ALP SERPL-CCNC: 60 U/L (ref 39–117)
ALT SERPL W P-5'-P-CCNC: 12 U/L (ref 1–33)
ANION GAP SERPL CALCULATED.3IONS-SCNC: 11.2 MMOL/L (ref 5–15)
AST SERPL-CCNC: 21 U/L (ref 1–32)
BASOPHILS # BLD AUTO: 0.01 10*3/MM3 (ref 0–0.2)
BASOPHILS NFR BLD AUTO: 0.1 % (ref 0–1.5)
BILIRUB SERPL-MCNC: 0.5 MG/DL (ref 0–1.2)
BUN SERPL-MCNC: 18 MG/DL (ref 8–23)
BUN/CREAT SERPL: 34.6 (ref 7–25)
CALCIUM SPEC-SCNC: 8.3 MG/DL (ref 8.6–10.5)
CHLORIDE SERPL-SCNC: 101 MMOL/L (ref 98–107)
CO2 SERPL-SCNC: 27.8 MMOL/L (ref 22–29)
CREAT SERPL-MCNC: 0.52 MG/DL (ref 0.57–1)
DEPRECATED RDW RBC AUTO: 49 FL (ref 37–54)
EGFRCR SERPLBLD CKD-EPI 2021: 93.5 ML/MIN/1.73
EOSINOPHIL # BLD AUTO: 0.02 10*3/MM3 (ref 0–0.4)
EOSINOPHIL NFR BLD AUTO: 0.3 % (ref 0.3–6.2)
ERYTHROCYTE [DISTWIDTH] IN BLOOD BY AUTOMATED COUNT: 14.5 % (ref 12.3–15.4)
GLOBULIN UR ELPH-MCNC: 2.8 GM/DL
GLUCOSE BLDC GLUCOMTR-MCNC: 84 MG/DL (ref 70–130)
GLUCOSE BLDC GLUCOMTR-MCNC: 91 MG/DL (ref 70–130)
GLUCOSE SERPL-MCNC: 83 MG/DL (ref 65–99)
HCT VFR BLD AUTO: 27.6 % (ref 34–46.6)
HGB BLD-MCNC: 9 G/DL (ref 12–15.9)
LYMPHOCYTES # BLD AUTO: 0.97 10*3/MM3 (ref 0.7–3.1)
LYMPHOCYTES NFR BLD AUTO: 12.5 % (ref 19.6–45.3)
MCH RBC QN AUTO: 30.3 PG (ref 26.6–33)
MCHC RBC AUTO-ENTMCNC: 32.6 G/DL (ref 31.5–35.7)
MCV RBC AUTO: 92.9 FL (ref 79–97)
MONOCYTES # BLD AUTO: 2.19 10*3/MM3 (ref 0.1–0.9)
MONOCYTES NFR BLD AUTO: 28.2 % (ref 5–12)
NEUTROPHILS NFR BLD AUTO: 4.46 10*3/MM3 (ref 1.7–7)
NEUTROPHILS NFR BLD AUTO: 57.4 % (ref 42.7–76)
PLATELET # BLD AUTO: 326 10*3/MM3 (ref 140–450)
PMV BLD AUTO: 9.6 FL (ref 6–12)
POTASSIUM SERPL-SCNC: 4.2 MMOL/L (ref 3.5–5.2)
PROT SERPL-MCNC: 6 G/DL (ref 6–8.5)
RBC # BLD AUTO: 2.97 10*6/MM3 (ref 3.77–5.28)
SODIUM SERPL-SCNC: 140 MMOL/L (ref 136–145)
WBC NRBC COR # BLD: 7.77 10*3/MM3 (ref 3.4–10.8)

## 2022-10-09 PROCEDURE — 94761 N-INVAS EAR/PLS OXIMETRY MLT: CPT

## 2022-10-09 PROCEDURE — 97162 PT EVAL MOD COMPLEX 30 MIN: CPT

## 2022-10-09 PROCEDURE — 94664 DEMO&/EVAL PT USE INHALER: CPT

## 2022-10-09 PROCEDURE — 70450 CT HEAD/BRAIN W/O DYE: CPT

## 2022-10-09 PROCEDURE — 94799 UNLISTED PULMONARY SVC/PX: CPT

## 2022-10-09 PROCEDURE — 85025 COMPLETE CBC W/AUTO DIFF WBC: CPT | Performed by: INTERNAL MEDICINE

## 2022-10-09 PROCEDURE — 97530 THERAPEUTIC ACTIVITIES: CPT

## 2022-10-09 PROCEDURE — 82962 GLUCOSE BLOOD TEST: CPT

## 2022-10-09 PROCEDURE — 80053 COMPREHEN METABOLIC PANEL: CPT | Performed by: INTERNAL MEDICINE

## 2022-10-09 RX ADMIN — ATORVASTATIN CALCIUM 20 MG: 20 TABLET, FILM COATED ORAL at 11:37

## 2022-10-09 RX ADMIN — Medication 10 ML: at 20:53

## 2022-10-09 RX ADMIN — Medication 10 ML: at 09:00

## 2022-10-09 RX ADMIN — OLANZAPINE 5 MG: 5 TABLET ORAL at 20:52

## 2022-10-09 RX ADMIN — OLANZAPINE 5 MG: 5 TABLET ORAL at 11:38

## 2022-10-09 RX ADMIN — LETROZOLE 2.5 MG: 2.5 TABLET, FILM COATED ORAL at 11:38

## 2022-10-09 RX ADMIN — DONEPEZIL HYDROCHLORIDE 5 MG: 5 TABLET, FILM COATED ORAL at 20:53

## 2022-10-09 RX ADMIN — BUDESONIDE AND FORMOTEROL FUMARATE DIHYDRATE 2 PUFF: 160; 4.5 AEROSOL RESPIRATORY (INHALATION) at 08:18

## 2022-10-09 NOTE — PLAN OF CARE
Goal Outcome Evaluation:  Plan of Care Reviewed With: patient            VSS, Oriented to self and time, pleasant and cooperative today, see AM labs

## 2022-10-09 NOTE — THERAPY EVALUATION
Patient Name: Kennedi Cardenas  : 1940    MRN: [id number]                              Today's Date: 10/9/2022       Admit Date: 10/5/2022    Visit Dx:     ICD-10-CM ICD-9-CM   1. Traumatic subarachnoid hemorrhage with unknown loss of consciousness status, initial encounter  S06.6XAA 852.00   2. COVID-19  U07.1 079.89   3. Multiple falls  R29.6 V15.88   4. Closed head injury, initial encounter  S09.90XA 959.01     Patient Active Problem List   Diagnosis   • HLD (hyperlipidemia)   • Essential hypertension   • Acquired hypothyroidism   • Osteopenia   • Vitamin D deficiency   • Prediabetes   • Left displaced femoral neck fracture (HCC)   • Hypertension   • Hypothyroidism   • Impaired glucose tolerance   • Hypoxia   • Tobacco abuse   • Recurrent falls   • Dementia without behavioral disturbance (HCC)   • Panlobular emphysema (HCC)   • Suspicious spiculated left lower lobe lung mass worrisome for primary lung cancer   • Acute respiratory failure with hypoxia (HCC)   • Traumatic rhabdomyolysis, initial encounter (HCC)   • Vascular dementia without behavioral disturbance (HCC)   • Brief psychotic disorder (HCC)   • Small vessel disease, cerebrovascular severe   • Cerebral atrophy (HCC)   • Community acquired pneumonia of right upper lobe of lung   • UTI (urinary tract infection)   • COVID-19 virus infection   • Sepsis (HCC)   • Cytokine release syndrome, grade 2   • Traumatic subarachnoid hemorrhage with unknown loss of consciousness status   • Traumatic subarachnoid hemorrhage with unknown loss of consciousness status, initial encounter     Past Medical History:   Diagnosis Date   • Breast cancer (HCC)     Bilateral   • Cancer (HCC)    • Cataracts, bilateral    • Dementia (HCC)    • Hyperlipidemia    • Hypertension    • Hypothyroidism    • Macular degeneration    • Skin cancer     Left axilla, right leg, right cheek   • Uterine prolapse      Past Surgical History:   Procedure Laterality Date   • BREAST BIOPSY Left  2006    Benign   • BREAST LUMPECTOMY Bilateral 2018    Dr. Lily Stevens   • EYE SURGERY     • SKIN CANCER EXCISION      Right cheek, left axilla, right leg   • TOTAL HIP ARTHROPLASTY Left 02/23/2019    Procedure: TOTAL HIP ARTHROPLASTY ANTERIOR;  Surgeon: Magdalena De Los Santos MD;  Location: [facility] OR;  Service: Orthopedics      General Information     Row Name 10/09/22 1651          Physical Therapy Time and Intention    Document Type evaluation  -     Mode of Treatment individual therapy;physical therapy  -     Row Name 10/09/22 1651          General Information    Prior Level of Function independent:;gait;transfer;bed mobility  pt has been having a number of falls at nursing home  -     Existing Precautions/Restrictions fall  -     Barriers to Rehab medically complex;cognitive status;previous functional deficit  -     Row Name 10/09/22 1651          Living Environment    People in Home facility resident  -     Row Name 10/09/22 1651          Cognition    Orientation Status (Cognition) oriented to;person  -     Row Name 10/09/22 1651          Safety Issues, Functional Mobility    Impairments Affecting Function (Mobility) balance;cognition;endurance/activity tolerance;strength  -           User Key  (r) = Recorded By, (t) = Taken By, (c) = Cosigned By    Initials Name Provider Type     Tiffanie Brower PT Physical Therapist               Mobility     Row Name 10/09/22 1653          Bed Mobility    Bed Mobility supine-sit;sit-supine  -     Supine-Sit [place] (Bed Mobility) verbal cues;nonverbal cues (demo/gesture);minimum assist (75% patient effort)  -     Sit-Supine [place] (Bed Mobility) verbal cues;nonverbal cues (demo/gesture);minimum assist (75% patient effort)  -     Assistive Device (Bed Mobility) bed rails;head of bed elevated  -     Row Name 10/09/22 1653          Sit-Stand Transfer    Sit-Stand [place] (Transfers) verbal cues;nonverbal cues  (demo/gesture);moderate assist (50% patient effort)  -     Assistive Device (Sit-Stand Transfers) --  HHA  -     Row Name 10/09/22 1653          Gait/Stairs (Locomotion)    [place] Level (Gait) verbal cues;nonverbal cues (demo/gesture);moderate assist (50% patient effort)  -     Assistive Device (Gait) --  HHA  -     Distance in Feet (Gait) 3 side steps to HOB  -     Deviations/Abnormal Patterns (Gait) candis decreased;festinating/shuffling;gait speed decreased;stride length decreased  -     Bilateral Gait Deviations forward flexed posture  -     Comment, (Gait/Stairs) cues for weight shifting and advancing feet  -           User Key  (r) = Recorded By, (t) = Taken By, (c) = Cosigned By    Initials Name Provider Type     Tiffanie Brower, PT Physical Therapist               Obj/Interventions     Row Name 10/09/22 1653          Range of Motion Comprehensive    General Range of Motion no range of motion deficits identified  -     Row Name 10/09/22 1653          Strength Comprehensive (MMT)    Comment, General Manual Muscle Testing (MMT) Assessment generalized weakness noted with functional mobility and gait  -     Row Name 10/09/22 1653          Motor Skills    Therapeutic Exercise --  10 reps B LE AP and LAQ  -     Row Name 10/09/22 1653          Balance    Balance Assessment standing static balance;standing dynamic balance  -     Static Standing Balance verbal cues;non-verbal cues (demo/gesture);moderate assist  -     Dynamic Standing Balance verbal cues;non-verbal cues (demo/gesture);moderate assist  -           User Key  (r) = Recorded By, (t) = Taken By, (c) = Cosigned By    Initials Name Provider Type     Tiffanie Brower, PT Physical Therapist               Goals/Plan     Row Name 10/09/22 1656          Bed Mobility Goal 1 (PT)    Activity/Assistive Device (Bed Mobility Goal 1, PT) bed mobility activities, all  -     [place] Level/Cues Needed (Bed Mobility Goal  1, PT) standby assist  -CH     Time Frame (Bed Mobility Goal 1, PT) 1 week  -     Row Name 10/09/22 1656          Transfer Goal 1 (PT)    Activity/Assistive Device (Transfer Goal 1, PT) transfers, all;walker, rolling  -CH     [place] Level/Cues Needed (Transfer Goal 1, PT) standby assist  -CH     Time Frame (Transfer Goal 1, PT) 1 week  -     Row Name 10/09/22 1656          Gait Training Goal 1 (PT)    Activity/Assistive Device (Gait Training Goal 1, PT) gait (walking locomotion);walker, rolling  -CH     [place] Level (Gait Training Goal 1, PT) contact guard required  -CH     Distance (Gait Training Goal 1, PT) 50  -CH     Time Frame (Gait Training Goal 1, PT) 1 week  -     Row Name 10/09/22 1656          Therapy Assessment/Plan (PT)    Planned Therapy Interventions (PT) balance training;bed mobility training;gait training;home exercise program;patient/family education;strengthening;transfer training  -           User Key  (r) = Recorded By, (t) = Taken By, (c) = Cosigned By    Initials Name Provider Type     Tiffanie Brower, PT Physical Therapist               Clinical Impression     Row Name 10/09/22 1654          Pain    Pretreatment Pain Rating 0/10 - no pain  -     Posttreatment Pain Rating 0/10 - no pain  -     Row Name 10/09/22 1654          Plan of Care Review    Plan of Care Reviewed With patient  -     Outcome Evaluation Pt is a 81 yo F who was admitted from a nursing with falls and SAH. Pt with recent history of COVID as well. Pt also has dementia. Pt presents to PT with impaired functional mobility and gait secondary to generalized weakness, impaired balance, and decreased activity tolerance. Pt may benefit from skilled PT to address strength, mobility, and gait.  -     Row Name 10/09/22 1654          Therapy Assessment/Plan (PT)    Patient/Family Therapy Goals Statement (PT) to go home  -     Rehab Potential (PT) good, to achieve stated therapy goals  -     Criteria  for Skilled Interventions Met (PT) skilled treatment is necessary  -     Therapy Frequency (PT) 3 times/wk  -     Row Name 10/09/22 1654          Positioning and Restraints    Pre-Treatment Position in bed  -     Post Treatment Position bed  -CH     In Bed notified nsg;supine;call light within reach;encouraged to call for assist;exit alarm on  -           User Key  (r) = Recorded By, (t) = Taken By, (c) = Cosigned By    Initials Name Provider Type     Tiffanie Brower PT Physical Therapist               Outcome Measures     Row Name 10/09/22 1657          How much help from another person do you currently need...    Turning from your back to your side while in flat bed without using bedrails? 3  -CH     Moving from lying on back to sitting on the side of a flat bed without bedrails? 3  -CH     Moving to and from a bed to a chair (including a wheelchair)? 3  -CH     Standing up from a chair using your arms (e.g., wheelchair, bedside chair)? 3  -CH     Climbing 3-5 steps with a railing? 1  -CH     To walk in hospital room? 2  -CH     AM-PAC 6 Clicks Score (PT) 15  -CH     Highest level of mobility 4 --> Transferred to chair/commode  -     Row Name 10/09/22 1657          Functional Assessment    Outcome Measure Options AM-PAC 6 Clicks Basic Mobility (PT)  -           User Key  (r) = Recorded By, (t) = Taken By, (c) = Cosigned By    Initials Name Provider Type     Tiffanie Brower PT Physical Therapist                             Physical Therapy Education     Title: PT OT SLP Therapies (Done)     Topic: Physical Therapy (Done)     Point: Mobility training (Done)     Learning Progress Summary           Patient Acceptance, E,TB,D, VU,NR by  at 10/9/2022 1657                   Point: Home exercise program (Done)     Learning Progress Summary           Patient Acceptance, E,TB,D, VU,NR by  at 10/9/2022 1657                   Point: Body mechanics (Done)     Learning Progress Summary            Patient Acceptance, E,TB,D, VU,NR by  at 10/9/2022 1657                   Point: Precautions (Done)     Learning Progress Summary           Patient Acceptance, E,TB,D, VU,NR by  at 10/9/2022 1657                               User Key     Initials Effective Dates Name Provider Type [facility] 06/16/21 -  Tiffanie Brower PT Physical Therapist PT              PT Recommendation and Plan  Planned Therapy Interventions (PT): balance training, bed mobility training, gait training, home exercise program, patient/family education, strengthening, transfer training  Plan of Care Reviewed With: patient  Outcome Evaluation: Pt is a 81 yo F who was admitted from a nursing with falls and SAH. Pt with recent history of COVID as well. Pt also has dementia. Pt presents to PT with impaired functional mobility and gait secondary to generalized weakness, impaired balance, and decreased activity tolerance. Pt may benefit from skilled PT to address strength, mobility, and gait.     Time Calculation:    PT Charges     Row Name 10/09/22 1658             Time Calculation    Start Time 1539  -      Stop Time 1553  -      Time Calculation (min) 14 min  -      PT Received On 10/09/22  -      PT - Next Appointment 10/11/22  -      PT Goal Re-Cert Due Date 10/16/22  -         Time Calculation- PT    Total Timed Code Minutes- PT 9 minute(s)  -         Timed Charges    08035 - PT Therapeutic Activity Minutes 9  -         Total Minutes    Timed Charges Total Minutes 9  -       Total Minutes 9  -            User Key  (r) = Recorded By, (t) = Taken By, (c) = Cosigned By    Initials Name Provider Type     Tiffanie Brower PT Physical Therapist              Therapy Charges for Today     Code Description Service Date Service Provider Modifiers Qty    58646233679  PT THERAPEUTIC ACT EA 15 MIN 10/9/2022 Tiffanie Brower, PT GP 1    62613687372 HC PT EVAL MOD COMPLEXITY 2 10/9/2022 Tiffanie Brower, PT GP 1           PT G-Codes  Outcome Measure Options: AM-PAC 6 Clicks Basic Mobility (PT)  AM-PAC 6 Clicks Score (PT): 15    Tiffanie Brower, PT  10/9/2022

## 2022-10-09 NOTE — PLAN OF CARE
Goal Outcome Evaluation:  Plan of Care Reviewed With: patient           Outcome Evaluation: Pt is a 81 yo F who was admitted from a nursing with falls and SAH. Pt with recent history of COVID as well. Pt also has dementia. Pt presents to PT with impaired functional mobility and gait secondary to generalized weakness, impaired balance, and decreased activity tolerance. Pt may benefit from skilled PT to address strength, mobility, and gait.

## 2022-10-09 NOTE — CASE MANAGEMENT/SOCIAL WORK
Continued Stay Note  [facility]     Patient Name: Kennedi Cardenas  MRN: [id number]  Today's Date: 10/9/2022    Admit Date: 10/5/2022    Plan: Return to [facility]- pending PT eval and pre cert   Discharge Plan     Row Name 10/09/22 1247       Plan    Plan Return to [facility]- pending PT eval and pre cert    Patient/Family in Agreement with Plan yes    Plan Comments CCP spoke with [facility]. The patient can return skilled to [facility] and will need a pre cert. CCP updated the RN/Didi for a PT order for pre cert. CCP to follow. Norma GARCÍA rn ccp               Discharge Codes    No documentation.                     Norma Schrader RN

## 2022-10-09 NOTE — PROGRESS NOTES
"  Daily Progress Note.   [facility] INTENSIVE CARE  10/9/2022    Patient:  Name:  Kennedi Cardenas  MRN:  [id number]  1940  82 y.o.  female         CC: sah    Interval History:  Afebrile   No acute events  Remains on 3 L nasal cannula asleep however does awaken says get off me and then says she denies any chest pain or shortness of breath.  She moves her extremities.  She does go back to sleep when not engaged      Physical Exam:  /58   Pulse 61   Temp 98.4 °F (36.9 °C) (Oral)   Resp 18   Ht 162.6 cm (64\")   Wt 63.6 kg (140 lb 3.4 oz)   SpO2 97%   BMI 24.07 kg/m²   Body mass index is 24.07 kg/m².    Intake/Output Summary (Last 24 hours) at 10/9/2022 0900  Last data filed at 10/9/2022 0546  Gross per 24 hour   Intake 0 ml   Output 0 ml   Net 0 ml     General appearance: ill, conversant   Eyes: anicteric sclerae, moist conjunctivae; no lid-lag;    HENT: Atraumatic; oropharynx clear with moist mucous membranes and no mucosal ulcerations; normal hard and soft palate  Neck: Trachea midline;  supple   Lungs: CTA, with normal respiratory effort and no intercostal retractions  CV: RRR, no MRGs   Abdomen: Soft, non-tender;   Skin: WWP no diffuse visible rash  Psych/neuro: calm affect, will awaken and answer questions appropriately speech intact moves exts       Data Review:  Notable Labs:  Results from last 7 days   Lab Units 10/09/22  0407 10/07/22  1326 10/07/22  0520 10/05/22  2100 10/04/22  0553   WBC 10*3/mm3 7.77  --  9.66 13.54*  --    HEMOGLOBIN g/dL 9.0* 10.5* 9.9* 11.1*  --    PLATELETS 10*3/mm3 326  --  373 523* 558*     Results from last 7 days   Lab Units 10/09/22  0407 10/07/22  1326 10/07/22  0520 10/07/22  0025 10/06/22  1252 10/06/22  0558 10/05/22  2100   SODIUM mmol/L 140 137 135* 135*  135* 136 136 141   POTASSIUM mmol/L 4.2  --   --  4.2  --   --  4.5   CHLORIDE mmol/L 101  --   --  97*  --   --  98   CO2 mmol/L 27.8  --   --  28.2  --   --  33.1*   BUN mg/dL 18  --   --  " 12  --   --  13   CREATININE mg/dL 0.52*  --   --  0.54*  --   --  0.69   GLUCOSE mg/dL 83  --   --  96  --   --  106*   CALCIUM mg/dL 8.3*  --   --  8.9  --   --  9.1   Estimated Creatinine Clearance: 85.2 mL/min (A) (by C-G formula based on SCr of 0.52 mg/dL (L)).    Results from last 7 days   Lab Units 10/09/22  0407 10/07/22  0520 10/07/22  0025 10/05/22  2100   AST (SGOT) U/L 21  --  23 31   ALT (SGPT) U/L 12  --  19 24   PROCALCITONIN ng/mL  --   --   --  0.07   LACTATE mmol/L  --   --   --  1.4   PLATELETS 10*3/mm3 326 373  --  523*             Imaging:  Reviewed chest images personally from past 3 days    ASSESSMENT  /  PLAN:  Traumatic parafalcine subarachnoid hemorrhage  Asymptomatic COVID-19 infection, on enhanced isolation - has already completed remdesivir and decadron course on prior admission  Dementia  Chronic hypoxemic resp failure  Lung adenocarcinoma - unwon stage pet ordered asoutpt  Vascular dementia  Tobacco abuse  Hypothyroidism  Hypertension hyperlipidemia       Okay from a neurosurgery standpoint to transfer to the floor or discharged to her facility when determined to be stable by her other healthcare providers  -No further neurosurgical intervention indicated at this time.  We will sign off for now.  Please call with questions or concerns    Medically stable for discharge.  Would be okay to go to facility today.  Discharge planning notified yesterday.    Izaiah Anders MD  [facility]  10/09/22  13:58 EDT

## 2022-10-09 NOTE — NURSING NOTE
CCP called spoke with Christin re: d/c back to [facility].  Christin stated she did not get a call back, would attempt to reach again today and patient would need a pre-cert prior to d/c.  MirandaRGURMEET

## 2022-10-10 LAB
ALBUMIN SERPL-MCNC: 3.2 G/DL (ref 3.5–5.2)
ALBUMIN/GLOB SERPL: 1 G/DL
ALP SERPL-CCNC: 66 U/L (ref 39–117)
ALT SERPL W P-5'-P-CCNC: 12 U/L (ref 1–33)
ANION GAP SERPL CALCULATED.3IONS-SCNC: 9.9 MMOL/L (ref 5–15)
AST SERPL-CCNC: 23 U/L (ref 1–32)
BACTERIA SPEC AEROBE CULT: NORMAL
BACTERIA SPEC AEROBE CULT: NORMAL
BASOPHILS # BLD AUTO: 0.02 10*3/MM3 (ref 0–0.2)
BASOPHILS NFR BLD AUTO: 0.2 % (ref 0–1.5)
BILIRUB SERPL-MCNC: 0.6 MG/DL (ref 0–1.2)
BUN SERPL-MCNC: 14 MG/DL (ref 8–23)
BUN/CREAT SERPL: 33.3 (ref 7–25)
CALCIUM SPEC-SCNC: 8.5 MG/DL (ref 8.6–10.5)
CHLORIDE SERPL-SCNC: 98 MMOL/L (ref 98–107)
CO2 SERPL-SCNC: 27.1 MMOL/L (ref 22–29)
CREAT SERPL-MCNC: 0.42 MG/DL (ref 0.57–1)
DEPRECATED RDW RBC AUTO: 49 FL (ref 37–54)
EGFRCR SERPLBLD CKD-EPI 2021: 98.4 ML/MIN/1.73
EOSINOPHIL # BLD AUTO: 0.02 10*3/MM3 (ref 0–0.4)
EOSINOPHIL NFR BLD AUTO: 0.2 % (ref 0.3–6.2)
ERYTHROCYTE [DISTWIDTH] IN BLOOD BY AUTOMATED COUNT: 14.5 % (ref 12.3–15.4)
GLOBULIN UR ELPH-MCNC: 3.2 GM/DL
GLUCOSE BLDC GLUCOMTR-MCNC: 78 MG/DL (ref 70–130)
GLUCOSE BLDC GLUCOMTR-MCNC: 83 MG/DL (ref 70–130)
GLUCOSE BLDC GLUCOMTR-MCNC: 83 MG/DL (ref 70–130)
GLUCOSE SERPL-MCNC: 81 MG/DL (ref 65–99)
HCT VFR BLD AUTO: 30.9 % (ref 34–46.6)
HGB BLD-MCNC: 10 G/DL (ref 12–15.9)
LYMPHOCYTES # BLD AUTO: 0.98 10*3/MM3 (ref 0.7–3.1)
LYMPHOCYTES NFR BLD AUTO: 12.2 % (ref 19.6–45.3)
MCH RBC QN AUTO: 30.1 PG (ref 26.6–33)
MCHC RBC AUTO-ENTMCNC: 32.4 G/DL (ref 31.5–35.7)
MCV RBC AUTO: 93.1 FL (ref 79–97)
MONOCYTES # BLD AUTO: 1.94 10*3/MM3 (ref 0.1–0.9)
MONOCYTES NFR BLD AUTO: 24.1 % (ref 5–12)
NEUTROPHILS NFR BLD AUTO: 4.92 10*3/MM3 (ref 1.7–7)
NEUTROPHILS NFR BLD AUTO: 61.3 % (ref 42.7–76)
PLATELET # BLD AUTO: 306 10*3/MM3 (ref 140–450)
PMV BLD AUTO: 9.8 FL (ref 6–12)
POTASSIUM SERPL-SCNC: 4.3 MMOL/L (ref 3.5–5.2)
PROT SERPL-MCNC: 6.4 G/DL (ref 6–8.5)
RBC # BLD AUTO: 3.32 10*6/MM3 (ref 3.77–5.28)
SODIUM SERPL-SCNC: 135 MMOL/L (ref 136–145)
WBC NRBC COR # BLD: 8.04 10*3/MM3 (ref 3.4–10.8)

## 2022-10-10 PROCEDURE — 82962 GLUCOSE BLOOD TEST: CPT

## 2022-10-10 PROCEDURE — 97535 SELF CARE MNGMENT TRAINING: CPT

## 2022-10-10 PROCEDURE — 97166 OT EVAL MOD COMPLEX 45 MIN: CPT

## 2022-10-10 PROCEDURE — 94799 UNLISTED PULMONARY SVC/PX: CPT

## 2022-10-10 PROCEDURE — 80053 COMPREHEN METABOLIC PANEL: CPT | Performed by: INTERNAL MEDICINE

## 2022-10-10 PROCEDURE — 94761 N-INVAS EAR/PLS OXIMETRY MLT: CPT

## 2022-10-10 PROCEDURE — 94760 N-INVAS EAR/PLS OXIMETRY 1: CPT

## 2022-10-10 PROCEDURE — 85025 COMPLETE CBC W/AUTO DIFF WBC: CPT | Performed by: INTERNAL MEDICINE

## 2022-10-10 PROCEDURE — 94664 DEMO&/EVAL PT USE INHALER: CPT

## 2022-10-10 RX ADMIN — ATORVASTATIN CALCIUM 20 MG: 20 TABLET, FILM COATED ORAL at 09:56

## 2022-10-10 RX ADMIN — OLANZAPINE 5 MG: 5 TABLET ORAL at 09:56

## 2022-10-10 RX ADMIN — LEVOTHYROXINE SODIUM 125 MCG: 0.12 TABLET ORAL at 09:57

## 2022-10-10 RX ADMIN — Medication 10 ML: at 21:06

## 2022-10-10 RX ADMIN — OLANZAPINE 5 MG: 5 TABLET ORAL at 21:05

## 2022-10-10 RX ADMIN — DONEPEZIL HYDROCHLORIDE 5 MG: 5 TABLET, FILM COATED ORAL at 21:05

## 2022-10-10 RX ADMIN — LETROZOLE 2.5 MG: 2.5 TABLET, FILM COATED ORAL at 09:56

## 2022-10-10 RX ADMIN — Medication 10 ML: at 09:56

## 2022-10-10 RX ADMIN — BUDESONIDE AND FORMOTEROL FUMARATE DIHYDRATE 2 PUFF: 160; 4.5 AEROSOL RESPIRATORY (INHALATION) at 08:09

## 2022-10-10 NOTE — PLAN OF CARE
Goal Outcome Evaluation:  Plan of Care Reviewed With: patient           Outcome Evaluation: Pt admit from NH with fall, work up for subarachnoid hemorrhage and recent COVID (+) and cont in enhanced isolation. Pt history includes dementia and 3 recent falls at facility.  Pt seen by OT and demo  little verbalization/following commands, encouragement to move to sit and stand EOB while RN changes linens.  Pt is max A/dependent to move to sit EOB and for a brief stand at EOB.   Full assist with toileting.  RN reports pt self feeding with setup and cueing.  Will cont to follow while in acute care.

## 2022-10-10 NOTE — PROGRESS NOTES
"  Daily Progress Note.   [facility] INTENSIVE CARE  10/10/2022    Patient:  Name:  Kennedi Cardenas  MRN:  [id number]  1940  82 y.o.  female         CC: sah    Interval History:  Patient remains afebrile is on 4 L nasal cannula saturation 100%.  No fevers overnight no tachycardia no difficulties with hypertension.  No acute issues  She awakens from sleep she denies any chest pain or shortness of breath she says she ate something good today but she cannot tell me what it was.  She says she is doing okay no complaints history limited somewhat by patient participation/underlying chronic dementia    Physical Exam:  /68   Pulse 80   Temp 97.8 °F (36.6 °C) (Oral)   Resp 16   Ht 162.6 cm (64\")   Wt 59.3 kg (130 lb 11.7 oz)   SpO2 93%   BMI 22.44 kg/m²   Body mass index is 22.44 kg/m².    Intake/Output Summary (Last 24 hours) at 10/10/2022 1441  Last data filed at 10/10/2022 1000  Gross per 24 hour   Intake 200 ml   Output 0 ml   Net 200 ml     General appearance: ill, conversant   Eyes: anicteric sclerae, moist conjunctivae; no lid-lag;    HENT: Atraumatic; oropharynx clear with moist mucous membranes and no mucosal ulcerations; normal hard and soft palate  Neck: Trachea midline;  supple   Lungs: Bilateral air entry without rhonchi or wheeze, with normal respiratory effort and no intercostal retractions  CV: RRR, no MRGs   Abdomen: Soft, non-tender;   Skin: WWP no diffuse visible rash  Psych/neuro: calm affect, will awaken and answer questions appropriately speech intact moves exts       Data Review:  Notable Labs:  Results from last 7 days   Lab Units 10/10/22  0350 10/09/22  0407 10/07/22  1326 10/07/22  0520 10/05/22  2100 10/04/22  0553   WBC 10*3/mm3 8.04 7.77  --  9.66 13.54*  --    HEMOGLOBIN g/dL 10.0* 9.0* 10.5* 9.9* 11.1*  --    PLATELETS 10*3/mm3 306 326  --  373 523* 558*     Results from last 7 days   Lab Units 10/10/22  0350 10/09/22  0407 10/07/22  1326 10/07/22  0520 " 10/07/22  0025 10/06/22  1252 10/06/22  0558 10/05/22  2100   SODIUM mmol/L 135* 140 137 135* 135*  135* 136 136 141   POTASSIUM mmol/L 4.3 4.2  --   --  4.2  --   --  4.5   CHLORIDE mmol/L 98 101  --   --  97*  --   --  98   CO2 mmol/L 27.1 27.8  --   --  28.2  --   --  33.1*   BUN mg/dL 14 18  --   --  12  --   --  13   CREATININE mg/dL 0.42* 0.52*  --   --  0.54*  --   --  0.69   GLUCOSE mg/dL 81 83  --   --  96  --   --  106*   CALCIUM mg/dL 8.5* 8.3*  --   --  8.9  --   --  9.1   Estimated Creatinine Clearance: 98.3 mL/min (A) (by C-G formula based on SCr of 0.42 mg/dL (L)).    Results from last 7 days   Lab Units 10/10/22  0350 10/09/22  0407 10/07/22  0520 10/07/22  0025 10/05/22  2100   AST (SGOT) U/L 23 21  --  23 31   ALT (SGPT) U/L 12 12  --  19 24   PROCALCITONIN ng/mL  --   --   --   --  0.07   LACTATE mmol/L  --   --   --   --  1.4   PLATELETS 10*3/mm3 306 326 373  --  523*             Imaging:  Reviewed chest images personally from past 3 days    ASSESSMENT  /  PLAN:  Traumatic parafalcine subarachnoid hemorrhage  Asymptomatic COVID-19 infection, on enhanced isolation - has already completed remdesivir and decadron course on prior admission  Dementia  Chronic hypoxemic resp failure 3 L at baseline  Lung adenocarcinoma - unwon stage pet ordered asoutpt  Vascular dementia  Tobacco abuse  Hypothyroidism  Hypertension   hyperlipidemia       Okay from a neurosurgery standpoint to transfer to the floor or discharged to her facility when determined to be stable by her other healthcare providers  -No further neurosurgical intervention indicated at this time.  We will sign off for now.  Please call with questions or concerns    Medically stable for discharge.  Would be okay to go to facility today.  Awaiting pre-CERT    MedSurg overflow daily2    Plan of care and patient course was reviewed with multidisciplinary team in morning rounds.  Izaiah Anders MD  [facility]  10/10/22  15:22 EDT

## 2022-10-10 NOTE — THERAPY EVALUATION
Patient Name: Kennedi Cardenas  : 1940    MRN: [id number]                              Today's Date: 10/10/2022       Admit Date: 10/5/2022    Visit Dx:     ICD-10-CM ICD-9-CM   1. Traumatic subarachnoid hemorrhage with unknown loss of consciousness status, initial encounter  S06.6XAA 852.00   2. COVID-19  U07.1 079.89   3. Multiple falls  R29.6 V15.88   4. Closed head injury, initial encounter  S09.90XA 959.01     Patient Active Problem List   Diagnosis   • HLD (hyperlipidemia)   • Essential hypertension   • Acquired hypothyroidism   • Osteopenia   • Vitamin D deficiency   • Prediabetes   • Left displaced femoral neck fracture (HCC)   • Hypertension   • Hypothyroidism   • Impaired glucose tolerance   • Hypoxia   • Tobacco abuse   • Recurrent falls   • Dementia without behavioral disturbance (HCC)   • Panlobular emphysema (HCC)   • Suspicious spiculated left lower lobe lung mass worrisome for primary lung cancer   • Acute respiratory failure with hypoxia (HCC)   • Traumatic rhabdomyolysis, initial encounter (HCC)   • Vascular dementia without behavioral disturbance (HCC)   • Brief psychotic disorder (HCC)   • Small vessel disease, cerebrovascular severe   • Cerebral atrophy (HCC)   • Community acquired pneumonia of right upper lobe of lung   • UTI (urinary tract infection)   • COVID-19 virus infection   • Sepsis (HCC)   • Cytokine release syndrome, grade 2   • Traumatic subarachnoid hemorrhage with unknown loss of consciousness status   • Traumatic subarachnoid hemorrhage with unknown loss of consciousness status, initial encounter     Past Medical History:   Diagnosis Date   • Breast cancer (HCC)     Bilateral   • Cancer (HCC)    • Cataracts, bilateral    • Dementia (HCC)    • Hyperlipidemia    • Hypertension    • Hypothyroidism    • Macular degeneration    • Skin cancer     Left axilla, right leg, right cheek   • Uterine prolapse      Past Surgical History:   Procedure Laterality Date   • BREAST BIOPSY Left  "2006    Benign   • BREAST LUMPECTOMY Bilateral 2018    Dr. Lily Stevens   • EYE SURGERY     • SKIN CANCER EXCISION      Right cheek, left axilla, right leg   • TOTAL HIP ARTHROPLASTY Left 02/23/2019    Procedure: TOTAL HIP ARTHROPLASTY ANTERIOR;  Surgeon: Magdalena De Los Santos MD;  Location: [facility] OR;  Service: Orthopedics      General Information     Row Name 10/10/22 1442          OT Time and Intention    Document Type evaluation;therapy note (daily note)  -LE     Mode of Treatment occupational therapy;individual therapy  -     Row Name 10/10/22 1442          General Information    Patient Profile Reviewed yes  -LE     Prior Level of Function --  pt unable to state.  -LE     Existing Precautions/Restrictions fall   enhanced isolation  -     Row Name 10/10/22 1442          Living Environment    People in Home facility resident  -     Row Name 10/10/22 1442          Cognition    Orientation Status (Cognition) --  does not answer when asked name and read from band.  pt only states \"leave me alone\" and \"hospital\".  -LE     Row Name 10/10/22 1442          Safety Issues, Functional Mobility    Impairments Affecting Function (Mobility) balance;cognition;coordination;endurance/activity tolerance;postural/trunk control  -LE     Cognitive Impairments, Mobility Safety/Performance attention;awareness, need for assistance;problem-solving/reasoning;safety precaution awareness  -LE     Comment, Safety Issues/Impairments (Mobility) no skid socsk and gait belt  -LE           User Key  (r) = Recorded By, (t) = Taken By, (c) = Cosigned By    Initials Name Provider Type    LE Celia Pope OTR Occupational Therapist                 Mobility/ADL's     Row Name 10/10/22 1444          Bed Mobility    Bed Mobility scooting/bridging;supine-sit;sit-supine;rolling right  -LE     Rolling Left [place] (Bed Mobility) maximum assist (25% patient effort);2 person assist;dependent (less than 25% patient effort);verbal cues  " -LE     Scooting/Bridging [place] (Bed Mobility) dependent (less than 25% patient effort);2 person assist;nonverbal cues (demo/gesture);verbal cues  -LE     Supine-Sit [place] (Bed Mobility) maximum assist (25% patient effort);nonverbal cues (demo/gesture);verbal cues;dependent (less than 25% patient effort)  -LE     Sit-Supine [place] (Bed Mobility) nonverbal cues (demo/gesture);verbal cues;maximum assist (25% patient effort);dependent (less than 25% patient effort)  -LE     Bed Mobility, Safety Issues cognitive deficits limit understanding;decreased use of arms for pushing/pulling;decreased use of legs for bridging/pushing;impaired trunk control for bed mobility  -LE     Assistive Device (Bed Mobility) bed rails;draw sheet;head of bed elevated  -     Row Name 10/10/22 1444          Transfers    Transfers sit-stand transfer;stand-sit transfer  -     Row Name 10/10/22 144          Sit-Stand Transfer    Sit-Stand [place] (Transfers) maximum assist (25% patient effort);nonverbal cues (demo/gesture);verbal cues  -     Row Name 10/10/22 144          Stand-Sit Transfer    Stand-Sit [place] (Transfers) maximum assist (25% patient effort);nonverbal cues (demo/gesture);verbal cues  -     Row Name 10/10/22 144          Functional Mobility    Functional Mobility- Comment not able to attempt, poor standing balance, tolerance.  -     Row Name 10/10/22 1444          Activities of Daily Living    BADL Assessment/Intervention toileting;feeding;lower body dressing  -     Row Name 10/10/22 144          Toileting Assessment/Training    [place] Level (Toileting) dependent (less than 25% patient effort)  -MOOSE     Comment, (Toileting) 2 person assist to change brief supine.  -     Row Name 10/10/22 1444          Self-Feeding Assessment/Training    Comment, (Feeding) set up, VC per RN.  -MOOSE           User Key  (r) = Recorded By, (t) = Taken By, (c) = Cosigned By    Initials Name Provider  Type    Celia Lafleur OTR Occupational Therapist               Obj/Interventions     Row Name 10/10/22 1446          Sensory Assessment (Somatosensory)    Sensory Assessment (Somatosensory) unable/difficult to assess  -     Row Name 10/10/22 1446          Vision Assessment/Intervention    Visual Impairment/Limitations unable/difficult to assess  -[facility] Name 10/10/22 1446          Range of Motion Comprehensive    Comment, General Range of Motion not formally assessed due to cognition  -     Row Name 10/10/22 1446          Balance    Balance Assessment sitting static balance;standing static balance  -LE     Static Sitting Balance moderate assist;maximum assist;non-verbal cues (demo/gesture);verbal cues  -LE     Position, Sitting Balance sitting edge of bed  -LE     Static Standing Balance maximum assist;verbal cues;non-verbal cues (demo/gesture)  -LE     Comment, Balance cues for gaze and assist for posture.  -LE           User Key  (r) = Recorded By, (t) = Taken By, (c) = Cosigned By    Initials Name Provider Type    Celia Lafleur OTR Occupational Therapist               Goals/Plan     Row Name 10/10/22 1450          Transfer Goal 1 (OT)    Activity/Assistive Device (Transfer Goal 1, OT) sit-to-stand/stand-to-sit;bed-to-chair/chair-to-bed;toilet  -LE     [place] Level/Cues Needed (Transfer Goal 1, OT) moderate assist (50-74% patient effort)  -LE     Time Frame (Transfer Goal 1, OT) 2 weeks  -LE     Progress/Outcome (Transfer Goal 1, OT) goal ongoing  -[facility] Name 10/10/22 1450          Dressing Goal 1 (OT)    Activity/Device (Dressing Goal 1, OT) lower body dressing;upper body dressing  -LE     [place]/Cues Needed (Dressing Goal 1, OT) moderate assist (50-74% patient effort)  -LE     Time Frame (Dressing Goal 1, OT) 2 weeks  -LE     Progress/Outcome (Dressing Goal 1, OT) goal ongoing  -[facility] Name 10/10/22 1450          Therapy Assessment/Plan (OT)    Planned Therapy Interventions  (OT) adaptive equipment training;BADL retraining;transfer/mobility retraining;patient/caregiver education/training;functional balance retraining;cognitive/visual perception retraining;ROM/therapeutic exercise;occupation/activity based interventions;activity tolerance training  -LE           User Key  (r) = Recorded By, (t) = Taken By, (c) = Cosigned By    Initials Name Provider Type    Celia Lafleur OTR Occupational Therapist               Clinical Impression     Row Name 10/10/22 1438          Pain Assessment    Pre/Posttreatment Pain Comment no overt grimace or complaint of pain  -LE     Row Name 10/10/22 1438          Plan of Care Review    Plan of Care Reviewed With patient  -LE     Outcome Evaluation Pt admit from NH with fall, work up for subarachnoid hemorrhage and recent COVID (+) and cont in enhanced isolation. Pt history includes dementia and 3 recent falls at facility.  Pt seen by OT and demo  little verbalization/following commands, encouragement to move to sit and stand EOB while RN changes linens.  Pt is max A/dependent to move to sit EOB and for a brief stand at EOB.   Full assist with toileting.  RN reports pt self feeding with setup and cueing.  Will cont to follow while in acute care.  -LE     Row Name 10/10/22 1438          Therapy Assessment/Plan (OT)    Rehab Potential (OT) fair, will monitor progress closely  -LE     Criteria for Skilled Therapeutic Interventions Met (OT) meets criteria;yes  -LE     Therapy Frequency (OT) 3 times/wk  -LE     Row Name 10/10/22 1438          Therapy Plan Review/Discharge Plan (OT)    Anticipated Discharge Disposition (OT) skilled nursing facility  -     Row Name 10/10/22 1438          Vital Signs    Pre SpO2 (%) 96  -LE     O2 Delivery Pre Treatment supplemental O2  -LE     O2 Delivery Intra Treatment supplemental O2  -LE     Post SpO2 (%) 93  -LE     O2 Delivery Post Treatment supplemental O2  -LE     Pre Patient Position Supine  -LE     Intra Patient  Position Standing  -LE     Post Patient Position Side Lying  -LE     Row Name 10/10/22 9907          Positioning and Restraints    Pre-Treatment Position in bed  -LE     Post Treatment Position bed  -LE     In Bed notified nsg;side lying right;call light within reach;encouraged to call for assist;exit alarm on;pillow between legs  -LE           User Key  (r) = Recorded By, (t) = Taken By, (c) = Cosigned By    Initials Name Provider Type    Celia Lafleur OTR Occupational Therapist               Outcome Measures     Row Name 10/10/22 0909          How much help from another is currently needed...    Putting on and taking off regular lower body clothing? 1  -LE     Bathing (including washing, rinsing, and drying) 1  -LE     Toileting (which includes using toilet bed pan or urinal) 1  -LE     Putting on and taking off regular upper body clothing 1  -LE     Taking care of personal grooming (such as brushing teeth) 1  -LE     Eating meals 2  -LE     AM-PAC 6 Clicks Score (OT) 7  -     Row Name 10/10/22 0800          How much help from another person do you currently need...    Turning from your back to your side while in flat bed without using bedrails? 3  -JM     Moving from lying on back to sitting on the side of a flat bed without bedrails? 3  -JM     Moving to and from a bed to a chair (including a wheelchair)? 2  -JM     Standing up from a chair using your arms (e.g., wheelchair, bedside chair)? 2  -JM     Climbing 3-5 steps with a railing? 1  -JM     To walk in hospital room? 2  -JM     AM-PAC 6 Clicks Score (PT) 13  -JM     Highest level of mobility 4 --> Transferred to chair/commode  -JM     Row Name 10/10/22 1046          Modified [place] Scale    Modified [place] Scale 5 - Severe disability.  Bedridden, incontinent, and requiring constant nursing care and attention.  -     Row Name 10/10/22 8600          Functional Assessment    Outcome Measure Options AM-PAC 6 Clicks Daily Activity (OT);Modified [place]   -MOOSE           User Key  (r) = Recorded By, (t) = Taken By, (c) = Cosigned By    Initials Name Provider Type    LE Celia Pope OTR Occupational Therapist    Ashley Greene RN Registered Nurse                Occupational Therapy Education     Title: PT OT SLP Therapies (In Progress)     Topic: Occupational Therapy (In Progress)     Point: ADL training (In Progress)     Description:   Instruct learner(s) on proper safety adaptation and remediation techniques during self care or transfers.   Instruct in proper use of assistive devices.              Learning Progress Summary           Patient Nonacceptance, E, NL by MOOSE at 10/10/2022 1455    Comment: limited by dementia.  Ed pt role of OT, explain all movement and plan of care during session                   Point: Precautions (In Progress)     Description:   Instruct learner(s) on prescribed precautions during self-care and functional transfers.              Learning Progress Summary           Patient Nonacceptance, E, NL by MOOSE at 10/10/2022 1455    Comment: limited by dementia.  Ed pt role of OT, explain all movement and plan of care during session                   Point: Body mechanics (In Progress)     Description:   Instruct learner(s) on proper positioning and spine alignment during self-care, functional mobility activities and/or exercises.              Learning Progress Summary           Patient Nonacceptance, E, NL by MOOSE at 10/10/2022 1455    Comment: limited by dementia.  Ed pt role of OT, explain all movement and plan of care during session                               User Key     Initials Effective Dates Name Provider Type [facility]    MOOSE 06/16/21 -  Celia Pope OTR Occupational Therapist OT              OT Recommendation and Plan  Planned Therapy Interventions (OT): adaptive equipment training, BADL retraining, transfer/mobility retraining, patient/caregiver education/training, functional balance retraining, cognitive/visual perception  retraining, ROM/therapeutic exercise, occupation/activity based interventions, activity tolerance training  Therapy Frequency (OT): 3 times/wk  Plan of Care Review  Plan of Care Reviewed With: patient  Outcome Evaluation: Pt admit from NH with fall, work up for subarachnoid hemorrhage and recent COVID (+) and cont in enhanced isolation. Pt history includes dementia and 3 recent falls at facility.  Pt seen by OT and demo  little verbalization/following commands, encouragement to move to sit and stand EOB while RN changes linens.  Pt is max A/dependent to move to sit EOB and for a brief stand at EOB.   Full assist with toileting.  RN reports pt self feeding with setup and cueing.  Will cont to follow while in acute care.     Time Calculation:    Time Calculation- OT     Row Name 10/10/22 1456             Time Calculation- OT    OT Start Time 1410  -LE      OT Stop Time 1432  -LE      OT Time Calculation (min) 22 min  -LE      Total Timed Code Minutes- OT 12 minute(s)  -LE      OT Received On 10/10/22  -LE      OT - Next Appointment 10/11/22  -LE      OT Goal Re-Cert Due Date 10/24/22  -LE         Timed Charges    46022 - OT Self Care/Mgmt Minutes 12  -LE         Untimed Charges    OT Eval/Re-eval Minutes 10  -LE         Total Minutes    Timed Charges Total Minutes 12  -LE      Untimed Charges Total Minutes 10  -LE       Total Minutes 22  -LE            User Key  (r) = Recorded By, (t) = Taken By, (c) = Cosigned By    Initials Name Provider Type    LE Celia Pope, OTR Occupational Therapist              Therapy Charges for Today     Code Description Service Date Service Provider Modifiers Qty    86855079441 HC OT EVAL MOD COMPLEXITY 2 10/10/2022 Celia Pope OTR GO 1    [id number]  OT SELF CARE/MGMT/TRAIN EA 15 MIN 10/10/2022 Celia Pope OTR GO 1               DAVID Pabon  10/10/2022

## 2022-10-11 LAB
ALBUMIN SERPL-MCNC: 3 G/DL (ref 3.5–5.2)
ALBUMIN/GLOB SERPL: 1.2 G/DL
ALP SERPL-CCNC: 58 U/L (ref 39–117)
ALT SERPL W P-5'-P-CCNC: 13 U/L (ref 1–33)
ANION GAP SERPL CALCULATED.3IONS-SCNC: 10.8 MMOL/L (ref 5–15)
AST SERPL-CCNC: 15 U/L (ref 1–32)
BASOPHILS # BLD AUTO: 0.01 10*3/MM3 (ref 0–0.2)
BASOPHILS NFR BLD AUTO: 0.2 % (ref 0–1.5)
BILIRUB SERPL-MCNC: 0.4 MG/DL (ref 0–1.2)
BUN SERPL-MCNC: 13 MG/DL (ref 8–23)
BUN/CREAT SERPL: 31 (ref 7–25)
CALCIUM SPEC-SCNC: 8.3 MG/DL (ref 8.6–10.5)
CHLORIDE SERPL-SCNC: 101 MMOL/L (ref 98–107)
CO2 SERPL-SCNC: 28.2 MMOL/L (ref 22–29)
CREAT SERPL-MCNC: 0.42 MG/DL (ref 0.57–1)
DEPRECATED RDW RBC AUTO: 47.8 FL (ref 37–54)
EGFRCR SERPLBLD CKD-EPI 2021: 98.4 ML/MIN/1.73
EOSINOPHIL # BLD AUTO: 0.02 10*3/MM3 (ref 0–0.4)
EOSINOPHIL NFR BLD AUTO: 0.3 % (ref 0.3–6.2)
ERYTHROCYTE [DISTWIDTH] IN BLOOD BY AUTOMATED COUNT: 14.5 % (ref 12.3–15.4)
GLOBULIN UR ELPH-MCNC: 2.6 GM/DL
GLUCOSE BLDC GLUCOMTR-MCNC: 117 MG/DL (ref 70–130)
GLUCOSE BLDC GLUCOMTR-MCNC: 84 MG/DL (ref 70–130)
GLUCOSE SERPL-MCNC: 82 MG/DL (ref 65–99)
HCT VFR BLD AUTO: 27.6 % (ref 34–46.6)
HGB BLD-MCNC: 9.3 G/DL (ref 12–15.9)
LYMPHOCYTES # BLD AUTO: 0.84 10*3/MM3 (ref 0.7–3.1)
LYMPHOCYTES NFR BLD AUTO: 13 % (ref 19.6–45.3)
MCH RBC QN AUTO: 30.5 PG (ref 26.6–33)
MCHC RBC AUTO-ENTMCNC: 33.7 G/DL (ref 31.5–35.7)
MCV RBC AUTO: 90.5 FL (ref 79–97)
MONOCYTES # BLD AUTO: 1.52 10*3/MM3 (ref 0.1–0.9)
MONOCYTES NFR BLD AUTO: 23.6 % (ref 5–12)
NEUTROPHILS NFR BLD AUTO: 3.84 10*3/MM3 (ref 1.7–7)
NEUTROPHILS NFR BLD AUTO: 59.6 % (ref 42.7–76)
PLATELET # BLD AUTO: 274 10*3/MM3 (ref 140–450)
PMV BLD AUTO: 9.6 FL (ref 6–12)
POTASSIUM SERPL-SCNC: 4 MMOL/L (ref 3.5–5.2)
PROT SERPL-MCNC: 5.6 G/DL (ref 6–8.5)
RBC # BLD AUTO: 3.05 10*6/MM3 (ref 3.77–5.28)
SODIUM SERPL-SCNC: 140 MMOL/L (ref 136–145)
WBC NRBC COR # BLD: 6.44 10*3/MM3 (ref 3.4–10.8)

## 2022-10-11 PROCEDURE — 85025 COMPLETE CBC W/AUTO DIFF WBC: CPT | Performed by: INTERNAL MEDICINE

## 2022-10-11 PROCEDURE — 94799 UNLISTED PULMONARY SVC/PX: CPT

## 2022-10-11 PROCEDURE — 82962 GLUCOSE BLOOD TEST: CPT

## 2022-10-11 PROCEDURE — 80053 COMPREHEN METABOLIC PANEL: CPT | Performed by: INTERNAL MEDICINE

## 2022-10-11 PROCEDURE — 94761 N-INVAS EAR/PLS OXIMETRY MLT: CPT

## 2022-10-11 PROCEDURE — 94760 N-INVAS EAR/PLS OXIMETRY 1: CPT

## 2022-10-11 PROCEDURE — 97530 THERAPEUTIC ACTIVITIES: CPT

## 2022-10-11 RX ADMIN — DONEPEZIL HYDROCHLORIDE 5 MG: 5 TABLET, FILM COATED ORAL at 21:40

## 2022-10-11 RX ADMIN — OLANZAPINE 5 MG: 5 TABLET ORAL at 08:42

## 2022-10-11 RX ADMIN — BUDESONIDE AND FORMOTEROL FUMARATE DIHYDRATE 2 PUFF: 160; 4.5 AEROSOL RESPIRATORY (INHALATION) at 19:33

## 2022-10-11 RX ADMIN — Medication 10 ML: at 21:40

## 2022-10-11 RX ADMIN — Medication 10 ML: at 08:42

## 2022-10-11 RX ADMIN — ATORVASTATIN CALCIUM 20 MG: 20 TABLET, FILM COATED ORAL at 08:42

## 2022-10-11 RX ADMIN — LEVOTHYROXINE SODIUM 125 MCG: 0.12 TABLET ORAL at 06:26

## 2022-10-11 RX ADMIN — OLANZAPINE 5 MG: 5 TABLET ORAL at 21:40

## 2022-10-11 RX ADMIN — LETROZOLE 2.5 MG: 2.5 TABLET, FILM COATED ORAL at 08:42

## 2022-10-11 NOTE — PROGRESS NOTES
"  Daily Progress Note.   [facility] INTENSIVE CARE  10/11/2022    Patient:  Name:  Kennedi Cardenas  MRN:  [id number]  1940  82 y.o.  female         CC: sah    Interval History:  Remains on 3 L nasal cannula she is more alert today she actually ate some lunch- she answers questions appropriately denies any worsening shortness of breath cough or chest pain.  She says she is full ate a lot of lunch no emesis no abdominal pain.    Physical Exam:  /51   Pulse 65   Temp 97.5 °F (36.4 °C) (Axillary) Comment (Src): refused oral  Resp 18   Ht 162.6 cm (64\")   Wt 59.4 kg (130 lb 15.3 oz)   SpO2 94%   BMI 22.48 kg/m²   Body mass index is 22.48 kg/m².    Intake/Output Summary (Last 24 hours) at 10/11/2022 1239  Last data filed at 10/11/2022 0600  Gross per 24 hour   Intake 130 ml   Output --   Net 130 ml     General appearance: Chronically ill however nontoxic, conversant   Eyes: anicteric sclerae, moist conjunctivae; no lid-lag;    HENT: Atraumatic; oropharynx clear with moist mucous membranes and no mucosal ulcerations; normal hard and soft palate  Neck: Trachea midline;  supple   Lungs: Bilateral air entry without rhonchi or wheeze, with normal respiratory effort and no intercostal retractions  CV: RRR, no MRGs   Abdomen: Soft, non-tender;   Skin: WWP no diffuse visible rash  Psych/neuro: calm affect, will awaken and answer questions appropriately speech intact moves exts       Data Review:  Notable Labs:  Results from last 7 days   Lab Units 10/11/22  0351 10/10/22  0350 10/09/22  0407 10/07/22  1326 10/07/22  0520 10/05/22  2100   WBC 10*3/mm3 6.44 8.04 7.77  --  9.66 13.54*   HEMOGLOBIN g/dL 9.3* 10.0* 9.0* 10.5* 9.9* 11.1*   PLATELETS 10*3/mm3 274 306 326  --  373 523*     Results from last 7 days   Lab Units 10/11/22  0351 10/10/22  0350 10/09/22  0407 10/07/22  1326 10/07/22  0520 10/07/22  0025 10/06/22  1252 10/06/22  0558 10/05/22  2100   SODIUM mmol/L 140 135* 140 137 135* 135*  " "135* 136   < > 141   POTASSIUM mmol/L 4.0 4.3 4.2  --   --  4.2  --   --  4.5   CHLORIDE mmol/L 101 98 101  --   --  97*  --   --  98   CO2 mmol/L 28.2 27.1 27.8  --   --  28.2  --   --  33.1*   BUN mg/dL 13 14 18  --   --  12  --   --  13   CREATININE mg/dL 0.42* 0.42* 0.52*  --   --  0.54*  --   --  0.69   GLUCOSE mg/dL 82 81 83  --   --  96  --   --  106*   CALCIUM mg/dL 8.3* 8.5* 8.3*  --   --  8.9  --   --  9.1    < > = values in this interval not displayed.   Estimated Creatinine Clearance: 98.5 mL/min (A) (by C-G formula based on SCr of 0.42 mg/dL (L)).    Results from last 7 days   Lab Units 10/11/22  0351 10/10/22  0350 10/09/22  0407 10/07/22  0025 10/05/22  2100   AST (SGOT) U/L 15 23 21   < > 31   ALT (SGPT) U/L 13 12 12   < > 24   PROCALCITONIN ng/mL  --   --   --   --  0.07   LACTATE mmol/L  --   --   --   --  1.4   PLATELETS 10*3/mm3 274 306 326   < > 523*    < > = values in this interval not displayed.             Imaging:  Reviewed chest images personally from past 3 days    ASSESSMENT  /  PLAN:  Traumatic parafalcine subarachnoid hemorrhage  Asymptomatic COVID-19 infection, on enhanced isolation - has already completed remdesivir and decadron course on prior admission  Dementia  Chronic hypoxemic resp failure 3 L at baseline  Lung adenocarcinoma - unknown stage pet ordered as outpt  Vascular dementia  Tobacco abuse  Hypothyroidism  Hypertension   hyperlipidemia       \"Okay from a neurosurgery standpoint to transfer to the floor or discharged to her facility when determined to be stable by her other healthcare providers  -No further neurosurgical intervention indicated at this time.  We will sign off for now.  Please call with questions or concerns\"    Medically stable for discharge.  Would be okay to go to facility today.  Awaiting pre-CERT still    [facility] overflow day 3    Plan of care and patient course was reviewed with multidisciplinary team in morning rounds.      Izaiah Anders" MD [facility]  10/11/22  12:40 EDT

## 2022-10-11 NOTE — PLAN OF CARE
Goal Outcome Evaluation:               Alert to self.  Bed alarm in place.  Vitals stable.  Overflow.  Tolerating diet well.

## 2022-10-11 NOTE — PLAN OF CARE
Goal Outcome Evaluation:  Plan of Care Reviewed With: patient           Outcome Evaluation: Pt agreeable to sitting EOB and only required [place] to sit up. However, pt impulsively lied back down and declined further activity when PT was unable to produce her shoes.. PT will continue to follow to address strength, mobility, and gait.

## 2022-10-11 NOTE — PROGRESS NOTES
Continued Stay Note  [facility]     Patient Name: Kennedi Cardenas  MRN: [id number]  Today's Date: 10/11/2022    Admit Date: 10/5/2022    Plan: Return to [facility]   Discharge Plan     Row Name 10/11/22 1032       Plan    Plan Return to [facility]    Plan Comments Call out to Rena @ [facility]  requesting update on pre cert               Discharge Codes    No documentation.                     Tiffanie Kendall RN

## 2022-10-11 NOTE — THERAPY TREATMENT NOTE
Patient Name: Kennedi Cardenas  : 1940    MRN: [id number]                              Today's Date: 10/11/2022       Admit Date: 10/5/2022    Visit Dx:     ICD-10-CM ICD-9-CM   1. Traumatic subarachnoid hemorrhage with unknown loss of consciousness status, initial encounter  S06.6XAA 852.00   2. COVID-19  U07.1 079.89   3. Multiple falls  R29.6 V15.88   4. Closed head injury, initial encounter  S09.90XA 959.01     Patient Active Problem List   Diagnosis   • HLD (hyperlipidemia)   • Essential hypertension   • Acquired hypothyroidism   • Osteopenia   • Vitamin D deficiency   • Prediabetes   • Left displaced femoral neck fracture (HCC)   • Hypertension   • Hypothyroidism   • Impaired glucose tolerance   • Hypoxia   • Tobacco abuse   • Recurrent falls   • Dementia without behavioral disturbance (HCC)   • Panlobular emphysema (HCC)   • Suspicious spiculated left lower lobe lung mass worrisome for primary lung cancer   • Acute respiratory failure with hypoxia (HCC)   • Traumatic rhabdomyolysis, initial encounter (HCC)   • Vascular dementia without behavioral disturbance (HCC)   • Brief psychotic disorder (HCC)   • Small vessel disease, cerebrovascular severe   • Cerebral atrophy (HCC)   • Community acquired pneumonia of right upper lobe of lung   • UTI (urinary tract infection)   • COVID-19 virus infection   • Sepsis (HCC)   • Cytokine release syndrome, grade 2   • Traumatic subarachnoid hemorrhage with unknown loss of consciousness status   • Traumatic subarachnoid hemorrhage with unknown loss of consciousness status, initial encounter     Past Medical History:   Diagnosis Date   • Breast cancer (HCC)     Bilateral   • Cancer (HCC)    • Cataracts, bilateral    • Dementia (HCC)    • Hyperlipidemia    • Hypertension    • Hypothyroidism    • Macular degeneration    • Skin cancer     Left axilla, right leg, right cheek   • Uterine prolapse      Past Surgical History:   Procedure Laterality Date   • BREAST BIOPSY Left  2006    Benign   • BREAST LUMPECTOMY Bilateral 2018    Dr. Lily Stevens   • EYE SURGERY     • SKIN CANCER EXCISION      Right cheek, left axilla, right leg   • TOTAL HIP ARTHROPLASTY Left 02/23/2019    Procedure: TOTAL HIP ARTHROPLASTY ANTERIOR;  Surgeon: Magdalena De Los Santos MD;  Location: [facility] OR;  Service: Orthopedics      General Information     Row Name 10/11/22 1610          Physical Therapy Time and Intention    Document Type therapy note (daily note)  -     Mode of Treatment individual therapy;physical therapy  -     Row Name 10/11/22 1610          General Information    Existing Precautions/Restrictions fall  -     Row Name 10/11/22 1610          Cognition    Orientation Status (Cognition) oriented to;person  -     Row Name 10/11/22 1610          Safety Issues, Functional Mobility    Impairments Affecting Function (Mobility) balance;cognition;coordination;endurance/activity tolerance;postural/trunk control  -           User Key  (r) = Recorded By, (t) = Taken By, (c) = Cosigned By    Initials Name Provider Type    Tiffanie Fisher PT Physical Therapist               Mobility     Row Name 10/11/22 1614          Bed Mobility    Bed Mobility supine-sit;sit-supine  -     Supine-Sit [place] (Bed Mobility) verbal cues;nonverbal cues (demo/gesture);minimum assist (75% patient effort)  -     Sit-Supine [place] (Bed Mobility) verbal cues;nonverbal cues (demo/gesture);minimum assist (75% patient effort)  -     Assistive Device (Bed Mobility) bed rails;draw sheet;head of bed elevated  -     Comment, (Bed Mobility) pt agreeable to sitting EOB initially but then impulsively lied back down when PT was unable to find her shoes for standing.  -     Row Name 10/11/22 1614          Transfers    Comment, (Transfers) unable to attempt this date  -           User Key  (r) = Recorded By, (t) = Taken By, (c) = Cosigned By    Initials Name Provider Type    Tiffanie Fisher PT  Physical Therapist               Obj/Interventions    No documentation.                Goals/Plan    No documentation.                Clinical Impression     Row Name 10/11/22 1616          Pain    Pretreatment Pain Rating 0/10 - no pain  -     Posttreatment Pain Rating 0/10 - no pain  -     Row Name 10/11/22 1616          Plan of Care Review    Plan of Care Reviewed With patient  -     Outcome Evaluation Pt agreeable to sitting EOB and only required Dain to sit up. However, pt impulsively lied back down and declined further activity when PT was unable to produce her shoes.. PT will continue to follow to address strength, mobility, and gait.  -     Row Name 10/11/22 1616          Positioning and Restraints    Pre-Treatment Position in bed  -     Post Treatment Position bed  -     In Bed supine;call light within reach;encouraged to call for assist;exit alarm on  -           User Key  (r) = Recorded By, (t) = Taken By, (c) = Cosigned By    Initials Name Provider Type    CH Tiffanie Brower, PT Physical Therapist               Outcome Measures     Row Name 10/11/22 1620 10/11/22 0830       How much help from another person do you currently need...    Turning from your back to your side while in flat bed without using bedrails? 3  -CH 3  -KS    Moving from lying on back to sitting on the side of a flat bed without bedrails? 3  - 3  -KS    Moving to and from a bed to a chair (including a wheelchair)? 2  -CH 2  -KS    Standing up from a chair using your arms (e.g., wheelchair, bedside chair)? 2  - 2  -KS    Climbing 3-5 steps with a railing? 1  -CH 1  -KS    To walk in hospital room? 1  -CH 2  -KS    AM-PAC 6 Clicks Score (PT) 12  - 13  -KS    Highest level of mobility 4 --> Transferred to chair/commode  -CH 4 --> Transferred to chair/commode  -KS    Row Name 10/11/22 1620          Functional Assessment    Outcome Measure Options AM-PAC 6 Clicks Basic Mobility (PT)  -           User Key  (r) =  Recorded By, (t) = Taken By, (c) = Cosigned By    Initials Name Provider Type     Tiffanie Brower, PT Physical Therapist    Eli Cao RN Registered Nurse                             Physical Therapy Education     Title: PT OT SLP Therapies (In Progress)     Topic: Physical Therapy (Done)     Point: Mobility training (Done)     Learning Progress Summary           Patient Acceptance, E,TB,D, VU,NR by  at 10/11/2022 1621    Acceptance, E,TB,D, VU,NR by  at 10/9/2022 1657                   Point: Home exercise program (Done)     Learning Progress Summary           Patient Acceptance, E,TB,D, VU,NR by  at 10/9/2022 1657                   Point: Body mechanics (Done)     Learning Progress Summary           Patient Acceptance, E,TB,D, VU,NR by  at 10/11/2022 1621    Acceptance, E,TB,D, VU,NR by  at 10/9/2022 1657                   Point: Precautions (Done)     Learning Progress Summary           Patient Acceptance, E,TB,D, VU,NR by  at 10/11/2022 1621    Acceptance, E,TB,D, VU,NR by  at 10/9/2022 1657                               User Key     Initials Effective Dates Name Provider Type [facility] 06/16/21 -  Tiffanie Brower PT Physical Therapist PT              PT Recommendation and Plan  Planned Therapy Interventions (PT): balance training, bed mobility training, gait training, home exercise program, patient/family education, strengthening, transfer training  Plan of Care Reviewed With: patient  Outcome Evaluation: Pt agreeable to sitting EOB and only required Dain to sit up. However, pt impulsively lied back down and declined further activity when PT was unable to produce her shoes.. PT will continue to follow to address strength, mobility, and gait.     Time Calculation:    PT Charges     Row Name 10/11/22 1622             Time Calculation    Start Time 1504  -      Stop Time 1519  -      Time Calculation (min) 15 min  -      PT Received On 10/11/22  -      PT - Next  Appointment 10/12/22  -         Time Calculation- PT    Total Timed Code Minutes- PT 15 minute(s)  -CH         Timed Charges    67272 - PT Therapeutic Activity Minutes 15  -CH         Total Minutes    Timed Charges Total Minutes 15  -CH       Total Minutes 15  -CH            User Key  (r) = Recorded By, (t) = Taken By, (c) = Cosigned By    Initials Name Provider Type     Tiffanie Brower, PT Physical Therapist              Therapy Charges for Today     Code Description Service Date Service Provider Modifiers Qty    [id number]  PT THERAPEUTIC ACT EA 15 MIN 10/11/2022 Tiffanie Brower, PT GP 1          PT G-Codes  Outcome Measure Options: AM-PAC 6 Clicks Basic Mobility (PT)  AM-PAC 6 Clicks Score (PT): 12  AM-PAC 6 Clicks Score (OT): 7  Modified [place] Scale: 5 - Severe disability.  Bedridden, incontinent, and requiring constant nursing care and attention.    Tiffanie Brower, MOE  10/11/2022

## 2022-10-12 LAB
ALBUMIN SERPL-MCNC: 3.2 G/DL (ref 3.5–5.2)
ALBUMIN/GLOB SERPL: 1.1 G/DL
ALP SERPL-CCNC: 62 U/L (ref 39–117)
ALT SERPL W P-5'-P-CCNC: 11 U/L (ref 1–33)
ANION GAP SERPL CALCULATED.3IONS-SCNC: 8.3 MMOL/L (ref 5–15)
AST SERPL-CCNC: 20 U/L (ref 1–32)
BILIRUB SERPL-MCNC: 0.5 MG/DL (ref 0–1.2)
BUN SERPL-MCNC: 9 MG/DL (ref 8–23)
BUN/CREAT SERPL: 23.1 (ref 7–25)
CALCIUM SPEC-SCNC: 8.4 MG/DL (ref 8.6–10.5)
CHLORIDE SERPL-SCNC: 101 MMOL/L (ref 98–107)
CO2 SERPL-SCNC: 28.7 MMOL/L (ref 22–29)
CREAT SERPL-MCNC: 0.39 MG/DL (ref 0.57–1)
DEPRECATED RDW RBC AUTO: 48.2 FL (ref 37–54)
EGFRCR SERPLBLD CKD-EPI 2021: 100.2 ML/MIN/1.73
EOSINOPHIL # BLD MANUAL: 0.11 10*3/MM3 (ref 0–0.4)
EOSINOPHIL NFR BLD MANUAL: 2 % (ref 0.3–6.2)
ERYTHROCYTE [DISTWIDTH] IN BLOOD BY AUTOMATED COUNT: 14.4 % (ref 12.3–15.4)
GLOBULIN UR ELPH-MCNC: 2.8 GM/DL
GLUCOSE BLDC GLUCOMTR-MCNC: 115 MG/DL (ref 70–130)
GLUCOSE BLDC GLUCOMTR-MCNC: 168 MG/DL (ref 70–130)
GLUCOSE BLDC GLUCOMTR-MCNC: 64 MG/DL (ref 70–130)
GLUCOSE BLDC GLUCOMTR-MCNC: 69 MG/DL (ref 70–130)
GLUCOSE BLDC GLUCOMTR-MCNC: 78 MG/DL (ref 70–130)
GLUCOSE BLDC GLUCOMTR-MCNC: 80 MG/DL (ref 70–130)
GLUCOSE BLDC GLUCOMTR-MCNC: 87 MG/DL (ref 70–130)
GLUCOSE BLDC GLUCOMTR-MCNC: 93 MG/DL (ref 70–130)
GLUCOSE SERPL-MCNC: 86 MG/DL (ref 65–99)
HCT VFR BLD AUTO: 27.7 % (ref 34–46.6)
HGB BLD-MCNC: 9.1 G/DL (ref 12–15.9)
LYMPHOCYTES # BLD MANUAL: 0.44 10*3/MM3 (ref 0.7–3.1)
LYMPHOCYTES NFR BLD MANUAL: 21.2 % (ref 5–12)
MCH RBC QN AUTO: 30.4 PG (ref 26.6–33)
MCHC RBC AUTO-ENTMCNC: 32.9 G/DL (ref 31.5–35.7)
MCV RBC AUTO: 92.6 FL (ref 79–97)
MONOCYTES # BLD: 1.14 10*3/MM3 (ref 0.1–0.9)
MYCOBACTERIUM SPEC CULT: NORMAL
NEUTROPHILS # BLD AUTO: 3.71 10*3/MM3 (ref 1.7–7)
NEUTROPHILS NFR BLD MANUAL: 68.7 % (ref 42.7–76)
NIGHT BLUE STAIN TISS: NORMAL
PLAT MORPH BLD: NORMAL
PLATELET # BLD AUTO: 238 10*3/MM3 (ref 140–450)
PMV BLD AUTO: 10 FL (ref 6–12)
POTASSIUM SERPL-SCNC: 4.1 MMOL/L (ref 3.5–5.2)
PROT SERPL-MCNC: 6 G/DL (ref 6–8.5)
RBC # BLD AUTO: 2.99 10*6/MM3 (ref 3.77–5.28)
RBC MORPH BLD: NORMAL
SODIUM SERPL-SCNC: 138 MMOL/L (ref 136–145)
VARIANT LYMPHS NFR BLD MANUAL: 8.1 % (ref 19.6–45.3)
WBC MORPH BLD: NORMAL
WBC NRBC COR # BLD: 5.4 10*3/MM3 (ref 3.4–10.8)

## 2022-10-12 PROCEDURE — 94799 UNLISTED PULMONARY SVC/PX: CPT

## 2022-10-12 PROCEDURE — 36415 COLL VENOUS BLD VENIPUNCTURE: CPT | Performed by: INTERNAL MEDICINE

## 2022-10-12 PROCEDURE — 94664 DEMO&/EVAL PT USE INHALER: CPT

## 2022-10-12 PROCEDURE — 85007 BL SMEAR W/DIFF WBC COUNT: CPT | Performed by: INTERNAL MEDICINE

## 2022-10-12 PROCEDURE — 94761 N-INVAS EAR/PLS OXIMETRY MLT: CPT

## 2022-10-12 PROCEDURE — 82962 GLUCOSE BLOOD TEST: CPT

## 2022-10-12 PROCEDURE — 80053 COMPREHEN METABOLIC PANEL: CPT | Performed by: INTERNAL MEDICINE

## 2022-10-12 PROCEDURE — 85025 COMPLETE CBC W/AUTO DIFF WBC: CPT | Performed by: INTERNAL MEDICINE

## 2022-10-12 PROCEDURE — 25010000002 HALOPERIDOL LACTATE PER 5 MG: Performed by: INTERNAL MEDICINE

## 2022-10-12 RX ORDER — DEXTROSE MONOHYDRATE 25 G/50ML
25 INJECTION, SOLUTION INTRAVENOUS
Status: DISCONTINUED | OUTPATIENT
Start: 2022-10-12 | End: 2022-10-13 | Stop reason: HOSPADM

## 2022-10-12 RX ORDER — DEXTROSE MONOHYDRATE 25 G/50ML
INJECTION, SOLUTION INTRAVENOUS
Status: COMPLETED
Start: 2022-10-12 | End: 2022-10-12

## 2022-10-12 RX ORDER — NICOTINE POLACRILEX 4 MG
15 LOZENGE BUCCAL
Status: DISCONTINUED | OUTPATIENT
Start: 2022-10-12 | End: 2022-10-13 | Stop reason: HOSPADM

## 2022-10-12 RX ADMIN — BUDESONIDE AND FORMOTEROL FUMARATE DIHYDRATE 2 PUFF: 160; 4.5 AEROSOL RESPIRATORY (INHALATION) at 11:54

## 2022-10-12 RX ADMIN — DONEPEZIL HYDROCHLORIDE 5 MG: 5 TABLET, FILM COATED ORAL at 20:27

## 2022-10-12 RX ADMIN — OLANZAPINE 5 MG: 5 TABLET ORAL at 14:56

## 2022-10-12 RX ADMIN — DEXTROSE MONOHYDRATE 25 G: 25 INJECTION, SOLUTION INTRAVENOUS at 16:09

## 2022-10-12 RX ADMIN — Medication 10 ML: at 09:00

## 2022-10-12 RX ADMIN — HALOPERIDOL LACTATE 2 MG: 5 INJECTION, SOLUTION INTRAMUSCULAR at 01:02

## 2022-10-12 RX ADMIN — LETROZOLE 2.5 MG: 2.5 TABLET, FILM COATED ORAL at 14:56

## 2022-10-12 RX ADMIN — ATORVASTATIN CALCIUM 20 MG: 20 TABLET, FILM COATED ORAL at 14:56

## 2022-10-12 RX ADMIN — OLANZAPINE 5 MG: 5 TABLET ORAL at 20:27

## 2022-10-12 RX ADMIN — BUDESONIDE AND FORMOTEROL FUMARATE DIHYDRATE 2 PUFF: 160; 4.5 AEROSOL RESPIRATORY (INHALATION) at 20:09

## 2022-10-12 RX ADMIN — LEVOTHYROXINE SODIUM 125 MCG: 0.12 TABLET ORAL at 06:57

## 2022-10-12 NOTE — PROGRESS NOTES
[facility] INPATIENT PROGRESS NOTE         [facility] INTENSIVE CARE    10/12/2022      PATIENT IDENTIFICATION:  Name: Kennedi Cardenas ADMIT: 10/5/2022   : 1940  PCP: Duncan Garcia MD    MRN: [id number] LOS: 6 days   AGE/SEX: 82 y.o. female  ROOM: [facility]                     LOS 6    Reason for visit: Subarachnoid hemorrhage      SUBJECTIVE:      No new issues.  Awaiting pre-CERT for transfer to rehab.  I am seeing the patient for the first time today.  All patient problems are new to me.      Objective   OBJECTIVE:    Vital Sign Min/Max for last 24 hours  Temp  Min: 96 °F (35.6 °C)  Max: 98 °F (36.7 °C)   BP  Min: 99/65  Max: 138/71   Pulse  Min: 64  Max: 101   Resp  Min: 14  Max: 18   SpO2  Min: 85 %  Max: 100 %   No data recorded   No data recorded                         Body mass index is 22.48 kg/m².    Intake/Output Summary (Last 24 hours) at 10/12/2022 1046  Last data filed at 10/11/2022 1800  Gross per 24 hour   Intake 480 ml   Output 150 ml   Net 330 ml         Exam:  GEN:  No distress, appears stated age  EYES:   PERRL, anicteric sclerae  ENT:    External ears/nose normal, OP clear  NECK:  No adenopathy, midline trachea  LUNGS: Normal chest on inspection, palpation and auscultation  CV:  Normal S1S2, without murmur  ABD:  Nontender, nondistended, no hepatosplenomegaly, +BS  EXT:  No edema.  No cyanosis or clubbing.  No mottling and normal cap refill.    Assessment     Scheduled meds:  atorvastatin, 20 mg, Oral, Daily  budesonide-formoterol, 2 puff, Inhalation, BID - RT  donepezil, 5 mg, Oral, Nightly  letrozole, 2.5 mg, Oral, Daily  levothyroxine, 125 mcg, Oral, Q AM  OLANZapine, 5 mg, Oral, BID  sodium chloride, 10 mL, Intravenous, Q12H      IV meds:                         Data Review:  Results from last 7 days   Lab Units 10/12/22  0544 10/11/22  0351 10/10/22  0350 10/09/22  0407 10/07/22  1326 10/07/22  0520 10/07/22  0025   SODIUM mmol/L 138 140 135* 140 137   < > 135*  135*    POTASSIUM mmol/L 4.1 4.0 4.3 4.2  --   --  4.2   CHLORIDE mmol/L 101 101 98 101  --   --  97*   CO2 mmol/L 28.7 28.2 27.1 27.8  --   --  28.2   BUN mg/dL 9 13 14 18  --   --  12   CREATININE mg/dL 0.39* 0.42* 0.42* 0.52*  --   --  0.54*   GLUCOSE mg/dL 86 82 81 83  --   --  96   CALCIUM mg/dL 8.4* 8.3* 8.5* 8.3*  --   --  8.9    < > = values in this interval not displayed.         Estimated Creatinine Clearance: 106.1 mL/min (A) (by C-G formula based on SCr of 0.39 mg/dL (L)).  Results from last 7 days   Lab Units 10/12/22  0544 10/11/22  0351 10/10/22  0350 10/09/22  0407 10/07/22  1326 10/07/22  0520   WBC 10*3/mm3 5.40 6.44 8.04 7.77  --  9.66   HEMOGLOBIN g/dL 9.1* 9.3* 10.0* 9.0* 10.5* 9.9*   PLATELETS 10*3/mm3 238 274 306 326  --  373         Results from last 7 days   Lab Units 10/12/22  0544 10/11/22  0351 10/10/22  0350 10/09/22  0407 10/07/22  0025   ALT (SGPT) U/L 11 13 12 12 19   AST (SGOT) U/L 20 15 23 21 23         Results from last 7 days   Lab Units 10/05/22  2100   PROCALCITONIN ng/mL 0.07   LACTATE mmol/L 1.4         Glucose   Date/Time Value Ref Range Status   10/12/2022 0817 87 70 - 130 mg/dL Final     Comment:     Meter: [id number] : [id number] Suzy DURBIN   10/12/2022 0656 80 70 - 130 mg/dL Final     Comment:     Meter: [id number] : parminder Fernandez RN   10/12/2022 0157 93 70 - 130 mg/dL Final     Comment:     Meter: [id number] : parminder Fernandez RN   10/11/2022 1720 117 70 - 130 mg/dL Final     Comment:     Meter: [id number] : [id number] Jeffry Benitez RN   10/11/2022 1223 84 70 - 130 mg/dL Final     Comment:     Meter: [id number] : [id number] Jeffry Benitez RN   10/10/2022 1726 83 70 - 130 mg/dL Final     Comment:     Meter: [id number] : [id number] Gilson Ramirez RN   10/10/2022 0552 78 70 - 130 mg/dL Final     Comment:     Meter: [id number] : [id number] Ronal DURBIN     CT head 10/9 reviewed        Microbiology reviewed             Active Hospital Problems    Diagnosis  POA   • Traumatic subarachnoid hemorrhage with unknown loss of consciousness status [S06.6XAA]  Yes   • Traumatic subarachnoid hemorrhage with unknown loss of consciousness status, initial encounter [S06.6XAA]  Yes      Resolved Hospital Problems   No resolved problems to display.         ASSESSMENT:  Traumatic parafalcine subarachnoid hemorrhage  Asymptomatic COVID-19 infection, on enhanced isolation - has already completed remdesivir and decadron course on prior admission  Dementia  Chronic hypoxemic resp failure 3 L at baseline  Lung adenocarcinoma - unknown stage pet ordered as outpt  Vascular dementia  Tobacco abuse  Hypothyroidism  Hypertension   Hyperlipidemia        PLAN:    Awaiting precertification for discharge.  MedSurg overflow.    Discussed with multidisciplinary ICU team on rounds this morning.      Max Barragan MD  Pulmonary and Critical Care Medicine  [facility], [facility]  10/12/2022    10:46 EDT

## 2022-10-12 NOTE — PROGRESS NOTES
"Physicians Statement of Medical Necessity for  Ambulance Transportation    GENERAL INFORMATION     Name: Kennedi Cardenas  YOB: 1940  Medicare #:   Transport Date: 10/12/2022  (Valid for round trips this date, or for scheduled repetitive trips for 60 days from the date signed below.)  Origin: [facility]   Destination: [facility]  Is the Patient's stay covered under Medicare Part A (PPS/DRG?)Yes  Closest appropriate facility? Yes  If this a hosp-hosp transfer? No  Is this a hospice patient? No    MEDICAL NECESSITY QUESTIONAIRE    Ambulance Transportation is medically necessary only if other means of transportation are contraindicated or would be potentially harmful to the patient.  To meet this requirement, the patient must be either \"bed confined\" or suffer from a condition such that transport by means other than an ambulance is contraindicated by the patient's condition.  The following questions must be answered by the healthcare professional signing below for this form to be valid:     1) Describe the MEDICAL CONDITION (physical and/or mental) of this patient AT THE TIME OF AMBULANCE TRANSPORT that requires the patient to be transported in an ambulance, and why transport by other means is contraindicated by the patient's condition:   Past Medical History:   Diagnosis Date   • Breast cancer (HCC)     Bilateral   • Cancer (HCC)    • Cataracts, bilateral    • Dementia (HCC)    • Hyperlipidemia    • Hypertension    • Hypothyroidism    • Macular degeneration    • Skin cancer     Left axilla, right leg, right cheek   • Uterine prolapse       Past Surgical History:   Procedure Laterality Date   • BREAST BIOPSY Left 2006    Benign   • BREAST LUMPECTOMY Bilateral 2018    Dr. Lily Stevens   • EYE SURGERY     • SKIN CANCER EXCISION      Right cheek, left axilla, right leg   • TOTAL HIP ARTHROPLASTY Left 02/23/2019    Procedure: TOTAL HIP ARTHROPLASTY ANTERIOR;  Surgeon: Magdalena De Los Santos MD;  " "Location: [facility] MAIN OR;  Service: Orthopedics      2) Is this patient \"bed confined\" as defined below?Yes   To be \"bed confined\" the patient must satisfy all three of the following criteria:  (1) unable to get up from bed without assistance; AND (2) unable to ambulate;  AND (3) unable to sit in a chair or wheelchair.  3) Can this patient safely be transported by car or wheelchair van (I.e., may safely sit during transport, without an attendant or monitoring?)No   4. In addition to completing questions 1-3 above, please check any of the following conditions that apply*:          *Note: supporting documentation for any boxes checked must be maintained in the patient's medical records DVT requires elevation of a lower extremity, Medical attendant required, Requires oxygen - unable to self administer and Other Covid positive      SIGNATURE OF PHYSICIAN OR OTHER AUTHORIZED HEALTHCARE PROFESSIONAL    I certify that the above information is true and correct based on my evaluation of this patient, and represent that the patient requires transport by ambulance and that other forms of transport are contraindicated.  I understand that this information will be used by the Centers for Medicare and Medicaid Services (CMS) to support the determiniation of medical necessity for ambulance services, and I represent that I have personal knowledge of the patient's condition at the time of transport.       If this box is checked, I also certify that the patient is physically or mentally incapable of signing the ambulance service's claim form and that the institution with which I am affiliated has furnished care, services or assistance to the patient.  My signature below is made on behalf of the patient pursuant to 42 .36(b)(4). In accordance with 42 .37, the specific reason(s) that the patient is physically or mentally incapable of signing the claim for is as follows:     Signature of Physician or Healthcare Professional "  Date/Time:   10/12/2022 15:45 EDT       (For Scheduled repetitive transport, this form is not valid for transports performed more than 60 days after this date).                     Tiffanie Kendall RN                                                                                                                         --------------------------------------------------------------------------------------------  Printed Name and Credentials of Physician or Authorized Healthcare Professional     *Form must be signed by patient's attending physician for scheduled, repetitive transports,.  For non-repetitive ambulance transports, if unable to obtain the signature of the attending physician, any of the following may sign (please select below):     Physician  Clinical Nurse Specialist  Registered Nurse x    Physician Assistant  Discharge Planner x Licensed Practical Nurse     Nurse Practitioner   x

## 2022-10-12 NOTE — PLAN OF CARE
Patient confused and continuously attempting to get out of bed throughout the shift. Patient educated on risk for falling, previous falls, and reasons for not attempting to get out of bed. [place] her. Nurse attempted to orient patient, patient no agreeable to staying in bed. Haldol used to attempt to calm patient after other measures used. Patient eventually agreeable to staying in bed. No other events noted over night. Patient still confused, high falls risk. Bed alarm active, nursing to closely monitor.       Problem: Fall Injury Risk  Goal: Absence of Fall and Fall-Related Injury  Outcome: Ongoing, Progressing  Intervention: Identify and Manage Contributors  Recent Flowsheet Documentation  Taken 10/12/2022 0000 by Jim Rodas RN  Medication Review/Management: medications reviewed  Taken 10/11/2022 2000 by Jim Rodas RN  Medication Review/Management: medications reviewed  Intervention: Promote Injury-Free Environment  Recent Flowsheet Documentation  Taken 10/12/2022 0300 by Jim Rodas RN  Safety Promotion/Fall Prevention:   fall prevention program maintained   clutter free environment maintained   safety round/check completed  Taken 10/12/2022 0200 by Jim Rodas RN  Safety Promotion/Fall Prevention:   fall prevention program maintained   safety round/check completed   nonskid shoes/slippers when out of bed  Taken 10/12/2022 0100 by Jim Rodas RN  Safety Promotion/Fall Prevention:   safety round/check completed   clutter free environment maintained  Taken 10/12/2022 0000 by Jim Rodas RN  Safety Promotion/Fall Prevention:   clutter free environment maintained   fall prevention program maintained   lighting adjusted   muscle strengthening facilitated   nonskid shoes/slippers when out of bed   room organization consistent   safety round/check completed   toileting scheduled  Taken 10/11/2022 2300 by Adrianna  KLAUS Fernandez  Safety Promotion/Fall Prevention: safety round/check completed  Taken 10/11/2022 2200 by Jim Rodas RN  Safety Promotion/Fall Prevention: safety round/check completed  Taken 10/11/2022 2100 by Jim Rodas RN  Safety Promotion/Fall Prevention: safety round/check completed  Taken 10/11/2022 2000 by Jim Rodas RN  Safety Promotion/Fall Prevention:   activity supervised   fall prevention program maintained   clutter free environment maintained   lighting adjusted   muscle strengthening facilitated   safety round/check completed   toileting scheduled   room organization consistent     Problem: Skin Injury Risk Increased  Goal: Skin Health and Integrity  Outcome: Ongoing, Progressing  Intervention: Optimize Skin Protection  Recent Flowsheet Documentation  Taken 10/12/2022 0200 by Jim Rodas RN  Head of Bed (HOB) Positioning: HOB at 30 degrees  Taken 10/12/2022 0000 by Jim Rodas RN  Pressure Reduction Techniques:   frequent weight shift encouraged   heels elevated off bed   pressure points protected   weight shift assistance provided  Head of Bed (HOB) Positioning: HOB at 30 degrees  Pressure Reduction Devices:   positioning supports utilized   pressure-redistributing mattress utilized  Taken 10/11/2022 2200 by Jim Rodas RN  Head of Bed (HOB) Positioning: HOB at 30 degrees  Taken 10/11/2022 2000 by Jim Rodas RN  Pressure Reduction Techniques:   frequent weight shift encouraged   heels elevated off bed   positioned off wounds   pressure points protected   weight shift assistance provided  Head of Bed (HOB) Positioning: HOB at 30 degrees  Pressure Reduction Devices:   positioning supports utilized   specialty bed utilized   pressure-redistributing mattress utilized  Skin Protection:   adhesive use limited   incontinence pads utilized   pulse oximeter probe site changed   tubing/devices free from skin contact   transparent dressing maintained   silicone foam dressing in place   skin  sealant/moisture barrier applied     Problem: Adult Inpatient Plan of Care  Goal: Plan of Care Review  Outcome: Ongoing, Progressing  Goal: Absence of Hospital-Acquired Illness or Injury  Outcome: Ongoing, Progressing  Intervention: Identify and Manage Fall Risk  Recent Flowsheet Documentation  Taken 10/12/2022 0300 by Jim Rodas RN  Safety Promotion/Fall Prevention:   fall prevention program maintained   clutter free environment maintained   safety round/check completed  Taken 10/12/2022 0200 by Jim Rodas RN  Safety Promotion/Fall Prevention:   fall prevention program maintained   safety round/check completed   nonskid shoes/slippers when out of bed  Taken 10/12/2022 0100 by Jim Rodas RN  Safety Promotion/Fall Prevention:   safety round/check completed   clutter free environment maintained  Taken 10/12/2022 0000 by Jim Rodas RN  Safety Promotion/Fall Prevention:   clutter free environment maintained   fall prevention program maintained   lighting adjusted   muscle strengthening facilitated   nonskid shoes/slippers when out of bed   room organization consistent   safety round/check completed   toileting scheduled  Taken 10/11/2022 2300 by Jim Rodas RN  Safety Promotion/Fall Prevention: safety round/check completed  Taken 10/11/2022 2200 by Jim Rodas RN  Safety Promotion/Fall Prevention: safety round/check completed  Taken 10/11/2022 2100 by Jim Rodas RN  Safety Promotion/Fall Prevention: safety round/check completed  Taken 10/11/2022 2000 by Jim Rodas RN  Safety Promotion/Fall Prevention:   activity supervised   fall prevention program maintained   clutter free environment maintained   lighting adjusted   muscle strengthening facilitated   safety round/check completed   toileting scheduled   room organization consistent  Intervention: Prevent Skin Injury  Recent Flowsheet Documentation  Taken 10/12/2022 0200 by Jim Rodas RN  Body Position:   turned   left   upper extremity  elevated   lower extremity elevated  Taken 10/12/2022 0000 by Jim Rodas RN  Body Position:   turned   right   upper extremity elevated   lower extremity elevated  Taken 10/11/2022 2200 by Jim Rodas RN  Body Position:   turned   supine   upper extremity elevated   lower extremity elevated  Taken 10/11/2022 2000 by Jim Rodas RN  Body Position:   left   lower extremity elevated   upper extremity elevated  Skin Protection:   adhesive use limited   incontinence pads utilized   pulse oximeter probe site changed   tubing/devices free from skin contact   transparent dressing maintained   silicone foam dressing in place   skin sealant/moisture barrier applied  Intervention: Prevent and Manage VTE (Venous Thromboembolism) Risk  Recent Flowsheet Documentation  Taken 10/12/2022 0000 by Jim Rodas RN  Activity Management: bedrest  VTE Prevention/Management:   bilateral   sequential compression devices on  Taken 10/11/2022 2000 by Jim Rodas RN  Activity Management: bedrest  VTE Prevention/Management:   bilateral   sequential compression devices on  Intervention: Prevent Infection  Recent Flowsheet Documentation  Taken 10/12/2022 0000 by Jim Rodas RN  Infection Prevention:   environmental surveillance performed   equipment surfaces disinfected   hand hygiene promoted   personal protective equipment utilized   rest/sleep promoted   single patient room provided   visitors restricted/screened  Taken 10/11/2022 2000 by Jim Rodas RN  Infection Prevention:   environmental surveillance performed   equipment surfaces disinfected   personal protective equipment utilized   hand hygiene promoted   single patient room provided   rest/sleep promoted   visitors restricted/screened  Goal: Optimal Comfort and Wellbeing  Outcome: Ongoing, Progressing  Intervention: Provide Person-Centered Care  Recent Flowsheet Documentation  Taken 10/11/2022 2000 by Jim Rodas RN  Trust Relationship/Rapport:   care  explained   choices provided   emotional support provided   empathic listening provided   questions answered   questions encouraged   reassurance provided   thoughts/feelings acknowledged  Goal: Readiness for Transition of Care  Outcome: Ongoing, Progressing   Goal Outcome Evaluation:

## 2022-10-13 VITALS
TEMPERATURE: 97.9 F | OXYGEN SATURATION: 96 % | HEART RATE: 73 BPM | DIASTOLIC BLOOD PRESSURE: 56 MMHG | HEIGHT: 64 IN | BODY MASS INDEX: 22.39 KG/M2 | WEIGHT: 131.17 LBS | SYSTOLIC BLOOD PRESSURE: 107 MMHG | RESPIRATION RATE: 16 BRPM

## 2022-10-13 LAB
ALBUMIN SERPL-MCNC: 3.2 G/DL (ref 3.5–5.2)
ALBUMIN/GLOB SERPL: 1 G/DL
ALP SERPL-CCNC: 63 U/L (ref 39–117)
ALT SERPL W P-5'-P-CCNC: 10 U/L (ref 1–33)
ANION GAP SERPL CALCULATED.3IONS-SCNC: 9 MMOL/L (ref 5–15)
AST SERPL-CCNC: 23 U/L (ref 1–32)
BASOPHILS # BLD AUTO: 0.01 10*3/MM3 (ref 0–0.2)
BASOPHILS NFR BLD AUTO: 0.2 % (ref 0–1.5)
BILIRUB SERPL-MCNC: 0.5 MG/DL (ref 0–1.2)
BUN SERPL-MCNC: 7 MG/DL (ref 8–23)
BUN/CREAT SERPL: 17.1 (ref 7–25)
CALCIUM SPEC-SCNC: 8.8 MG/DL (ref 8.6–10.5)
CHLORIDE SERPL-SCNC: 98 MMOL/L (ref 98–107)
CO2 SERPL-SCNC: 27 MMOL/L (ref 22–29)
CREAT SERPL-MCNC: 0.41 MG/DL (ref 0.57–1)
DEPRECATED RDW RBC AUTO: 48.4 FL (ref 37–54)
EGFRCR SERPLBLD CKD-EPI 2021: 99 ML/MIN/1.73
EOSINOPHIL # BLD AUTO: 0.02 10*3/MM3 (ref 0–0.4)
EOSINOPHIL NFR BLD AUTO: 0.3 % (ref 0.3–6.2)
ERYTHROCYTE [DISTWIDTH] IN BLOOD BY AUTOMATED COUNT: 14.5 % (ref 12.3–15.4)
GLOBULIN UR ELPH-MCNC: 3.3 GM/DL
GLUCOSE BLDC GLUCOMTR-MCNC: 101 MG/DL (ref 70–130)
GLUCOSE SERPL-MCNC: 79 MG/DL (ref 65–99)
HCT VFR BLD AUTO: 28.5 % (ref 34–46.6)
HGB BLD-MCNC: 9.6 G/DL (ref 12–15.9)
LAB AP CASE REPORT: NORMAL
LAB AP CLINICAL INFORMATION: NORMAL
LAB AP DIAGNOSIS COMMENT: NORMAL
LAB AP SPECIAL STAINS: NORMAL
LYMPHOCYTES # BLD AUTO: 1.02 10*3/MM3 (ref 0.7–3.1)
LYMPHOCYTES NFR BLD AUTO: 15.9 % (ref 19.6–45.3)
Lab: NORMAL
Lab: NORMAL
MCH RBC QN AUTO: 31.4 PG (ref 26.6–33)
MCHC RBC AUTO-ENTMCNC: 33.7 G/DL (ref 31.5–35.7)
MCV RBC AUTO: 93.1 FL (ref 79–97)
MONOCYTES # BLD AUTO: 1.82 10*3/MM3 (ref 0.1–0.9)
MONOCYTES NFR BLD AUTO: 28.3 % (ref 5–12)
NEUTROPHILS NFR BLD AUTO: 3.49 10*3/MM3 (ref 1.7–7)
NEUTROPHILS NFR BLD AUTO: 54.4 % (ref 42.7–76)
PATH REPORT.ADDENDUM SPEC: NORMAL
PATH REPORT.FINAL DX SPEC: NORMAL
PATH REPORT.GROSS SPEC: NORMAL
PLATELET # BLD AUTO: 234 10*3/MM3 (ref 140–450)
PMV BLD AUTO: 10.7 FL (ref 6–12)
POTASSIUM SERPL-SCNC: 4.8 MMOL/L (ref 3.5–5.2)
PROT SERPL-MCNC: 6.5 G/DL (ref 6–8.5)
RBC # BLD AUTO: 3.06 10*6/MM3 (ref 3.77–5.28)
SODIUM SERPL-SCNC: 134 MMOL/L (ref 136–145)
WBC NRBC COR # BLD: 6.42 10*3/MM3 (ref 3.4–10.8)

## 2022-10-13 PROCEDURE — 80053 COMPREHEN METABOLIC PANEL: CPT | Performed by: INTERNAL MEDICINE

## 2022-10-13 PROCEDURE — 36415 COLL VENOUS BLD VENIPUNCTURE: CPT | Performed by: INTERNAL MEDICINE

## 2022-10-13 PROCEDURE — 82962 GLUCOSE BLOOD TEST: CPT

## 2022-10-13 PROCEDURE — 85025 COMPLETE CBC W/AUTO DIFF WBC: CPT | Performed by: INTERNAL MEDICINE

## 2022-10-13 PROCEDURE — 25010000002 HALOPERIDOL LACTATE PER 5 MG: Performed by: INTERNAL MEDICINE

## 2022-10-13 RX ADMIN — LEVOTHYROXINE SODIUM 125 MCG: 0.12 TABLET ORAL at 05:56

## 2022-10-13 RX ADMIN — HALOPERIDOL LACTATE 2 MG: 5 INJECTION, SOLUTION INTRAMUSCULAR at 00:27

## 2022-10-13 NOTE — NURSING NOTE
2630; report given the Nurse Siddiqui at [facility] [phone].   EMS arrived to transport patient .. son notified of  Transfer back to [facility]. .. care relinquished

## 2022-10-13 NOTE — PLAN OF CARE
Problem: Fall Injury Risk  Goal: Absence of Fall and Fall-Related Injury  Intervention: Identify and Manage Contributors  Recent Flowsheet Documentation  Taken 10/12/2022 2000 by Ashley Licea RN  Medication Review/Management: medications reviewed  Intervention: Promote Injury-Free Environment  Recent Flowsheet Documentation  Taken 10/13/2022 0100 by Ashley Licea RN  Safety Promotion/Fall Prevention: safety round/check completed  Taken 10/13/2022 0000 by Ashley Licea RN  Safety Promotion/Fall Prevention: safety round/check completed  Taken 10/12/2022 2300 by Ashley Licea RN  Safety Promotion/Fall Prevention: safety round/check completed  Taken 10/12/2022 2200 by Ashley Licea RN  Safety Promotion/Fall Prevention: safety round/check completed  Taken 10/12/2022 2100 by Ashley Licea RN  Safety Promotion/Fall Prevention: safety round/check completed  Taken 10/12/2022 2000 by Ashley Licea RN  Safety Promotion/Fall Prevention: safety round/check completed  Taken 10/12/2022 1900 by Ashley Licea RN  Safety Promotion/Fall Prevention: safety round/check completed     Problem: Skin Injury Risk Increased  Goal: Skin Health and Integrity  Intervention: Optimize Skin Protection  Recent Flowsheet Documentation  Taken 10/13/2022 0000 by Ashley Licea RN  Head of Bed (HOB) Positioning: HOB at 20-30 degrees  Taken 10/12/2022 2300 by Ashley Licea RN  Head of Bed (HOB) Positioning: HOB at 20-30 degrees  Taken 10/12/2022 2100 by Ashley Licea RN  Head of Bed (HOB) Positioning: HOB at 20-30 degrees  Taken 10/12/2022 2000 by Ashley Licea RN  Head of Bed (HOB) Positioning: HOB at 20-30 degrees     Problem: Adult Inpatient Plan of Care  Goal: Absence of Hospital-Acquired Illness or Injury  Intervention: Identify and Manage Fall Risk  Recent Flowsheet Documentation  Taken 10/13/2022 0100 by Ashley Licea RN  Safety Promotion/Fall Prevention: safety round/check  completed  Taken 10/13/2022 0000 by Ashley Licea RN  Safety Promotion/Fall Prevention: safety round/check completed  Taken 10/12/2022 2300 by Ashley Licea RN  Safety Promotion/Fall Prevention: safety round/check completed  Taken 10/12/2022 2200 by Ashley Licea RN  Safety Promotion/Fall Prevention: safety round/check completed  Taken 10/12/2022 2100 by Ashley Licea RN  Safety Promotion/Fall Prevention: safety round/check completed  Taken 10/12/2022 2000 by Ashley Licea RN  Safety Promotion/Fall Prevention: safety round/check completed  Taken 10/12/2022 1900 by Ashley Licea RN  Safety Promotion/Fall Prevention: safety round/check completed  Intervention: Prevent Skin Injury  Recent Flowsheet Documentation  Taken 10/13/2022 0000 by Ashley Licea RN  Body Position: position changed independently  Taken 10/12/2022 2300 by Ashley Licea RN  Body Position: position changed independently  Taken 10/12/2022 2100 by Ashley Licea RN  Body Position: position changed independently  Taken 10/12/2022 2000 by Ashley Licea RN  Body Position: position changed independently  Intervention: Prevent and Manage VTE (Venous Thromboembolism) Risk  Recent Flowsheet Documentation  Taken 10/13/2022 0000 by Ashley Licea RN  Range of Motion: active ROM (range of motion) encouraged  Taken 10/12/2022 2000 by Ashley Licea RN  Activity Management: bedrest  Range of Motion: active ROM (range of motion) encouraged  Intervention: Prevent Infection  Recent Flowsheet Documentation  Taken 10/13/2022 0500 by Ashley Licea RN  Infection Prevention:   hand hygiene promoted   personal protective equipment utilized   rest/sleep promoted  Taken 10/13/2022 0400 by Ashley Licea RN  Infection Prevention:   hand hygiene promoted   personal protective equipment utilized   rest/sleep promoted  Taken 10/13/2022 0300 by Ashley Licea RN  Infection Prevention:   hand hygiene promoted    personal protective equipment utilized   rest/sleep promoted  Taken 10/13/2022 0200 by Ashley Licea RN  Infection Prevention:   hand hygiene promoted   personal protective equipment utilized   rest/sleep promoted  Taken 10/13/2022 0100 by Ashley Licea RN  Infection Prevention:   hand hygiene promoted   personal protective equipment utilized   rest/sleep promoted  Taken 10/13/2022 0000 by Ashley Licea RN  Infection Prevention:   hand hygiene promoted   personal protective equipment utilized   rest/sleep promoted  Taken 10/12/2022 2300 by Ashley Licea RN  Infection Prevention:   hand hygiene promoted   personal protective equipment utilized   rest/sleep promoted  Taken 10/12/2022 2200 by Ashley Licea RN  Infection Prevention:   hand hygiene promoted   personal protective equipment utilized   rest/sleep promoted  Taken 10/12/2022 2100 by Ashley Licea RN  Infection Prevention:   hand hygiene promoted   personal protective equipment utilized   rest/sleep promoted  Taken 10/12/2022 2000 by Ashley Licea RN  Infection Prevention:   hand hygiene promoted   personal protective equipment utilized   rest/sleep promoted  Taken 10/12/2022 1900 by Ashley Licea RN  Infection Prevention:   hand hygiene promoted   personal protective equipment utilized   rest/sleep promoted   Goal Outcome Evaluation:

## 2022-10-13 NOTE — DISCHARGE SUMMARY
PHYSICIAN DISCHARGE SUMMARY                                                                        [facility]    Date of admit: 10/5/2022  Date of Discharge:  10/13/2022    PCP: Duncan Garcia MD    Discharge Diagnosis:     Traumatic subarachnoid hemorrhage with unknown loss of consciousness status    Traumatic subarachnoid hemorrhage with unknown loss of consciousness status, initial encounter  Traumatic parafalcine subarachnoid hemorrhage  Asymptomatic COVID-19 infection, on enhanced isolation - has already completed remdesivir and decadron course on prior admission  Dementia  Chronic hypoxemic resp failure 3 L at baseline  Lung adenocarcinoma - unknown stage pet ordered as outpt  Vascular dementia  Tobacco abuse  Hypothyroidism  Hypertension   Hyperlipidemia    Secondary Diagnoses:  Past Medical History:   Diagnosis Date   • Breast cancer (HCC)     Bilateral   • Cancer (HCC)    • Cataracts, bilateral    • Dementia (HCC)    • Hyperlipidemia    • Hypertension    • Hypothyroidism    • Macular degeneration    • Skin cancer     Left axilla, right leg, right cheek   • Uterine prolapse        Procedures Performed           Consults:       Hospital Course  Patient is a 82 y.o. female presented with   CC: Fall/subarachnoid hemorrhage     History of Present Illness:  Patient is a 82-year-old who has a recent history of a diagnosis with COVID she was given remdesivir.  She was discharged to nursing home.  She had a fall.  She was brought into the emergency room for evaluation.  She had a new subarachnoid hemorrhage.  Neurosurgery was consulted and asked admit to the ICU for close neurological monitoring.  The patient does use oxygen at her baseline.  Was discharged somewhere around 3 L nasal cannula and according to the patient's son and daughter-in-law.  She had been using this over the preceding months.  She has a relatively new diagnosis  of dementia which was originally found after she drove around little for 10 hours and a disorderly/lost manner.  Separately has been undergoing work-up further so the preceding few months or so according to the patient's family.  The patient is intermittently lucid can provide insight at times and other times cannot provide details regarding care.  She denies any chest pain or abdominal pain back pain or leg pain post fall        Patient admitted with subarachnoid hemorrhage post fall.  Seen by neurosurgery in consultation.  No surgical intervention was required.  Has clinically improved.  Has been working with physical therapy.  Plan to discharge back to rehab facility.  Had incidentally positive COVID-19 testing but asymptomatic.  Had recently completed Remdesivir and Decadron course prior to admission.  Clinically ready for discharge back to rehab.    Condition on Discharge:  Stable.      Vital Signs  Temp:  [96.2 °F (35.7 °C)-97.9 °F (36.6 °C)] 97.9 °F (36.6 °C)  Heart Rate:  [] 73  Resp:  [14-16] 16  BP: ()/() 107/56    Physical Exam:  General Appearance: no acute distress, appears comfortable  Heart: regular rate and rhythm  Lungs: clear to auscultation bilaterally, respirations unlabored  Abdomen: soft, nontender, nondistended, no guarding/rebound, nl BS  Extremeties: no edema, no cyanosis  Neurology: speech normal, mental status normal, moving all four extremities  Mental Status: alert, oriented        --  Consults:   IP CONSULT TO NEUROSURGERY  IP CONSULT TO PULMONOLOGY  IP CONSULT TO NEUROSURGERY    Significant Discharge Diagnostics   Procedures Performed:         Pertinent Lab Results:  Results from last 7 days   Lab Units 10/13/22  0555 10/12/22  0544 10/11/22  0351 10/10/22  0350 10/09/22  0407 10/07/22  1326 10/07/22  0520 10/07/22  0025   SODIUM mmol/L 134* 138 140 135* 140 137 135* 135*  135*   POTASSIUM mmol/L 4.8 4.1 4.0 4.3 4.2  --   --  4.2   CHLORIDE mmol/L 98 101 101 98 101   --   --  97*   CO2 mmol/L 27.0 28.7 28.2 27.1 27.8  --   --  28.2   BUN mg/dL 7* 9 13 14 18  --   --  12   CREATININE mg/dL 0.41* 0.39* 0.42* 0.42* 0.52*  --   --  0.54*   GLUCOSE mg/dL 79 86 82 81 83  --   --  96   CALCIUM mg/dL 8.8 8.4* 8.3* 8.5* 8.3*  --   --  8.9   AST (SGOT) U/L 23 20 15 23 21  --   --  23   ALT (SGPT) U/L 10 11 13 12 12  --   --  19         Results from last 7 days   Lab Units 10/13/22  0555 10/12/22  0544 10/11/22  0351 10/10/22  0350 10/09/22  0407 10/07/22  1326 10/07/22  0520   WBC 10*3/mm3 6.42 5.40 6.44 8.04 7.77  --  9.66   HEMOGLOBIN g/dL 9.6* 9.1* 9.3* 10.0* 9.0* 10.5* 9.9*   HEMATOCRIT % 28.5* 27.7* 27.6* 30.9* 27.6* 31.9* 30.8*   PLATELETS 10*3/mm3 234 238 274 306 326  --  373   MCV fL 93.1 92.6 90.5 93.1 92.9  --  93.9   MCH pg 31.4 30.4 30.5 30.1 30.3  --  30.2   MCHC g/dL 33.7 32.9 33.7 32.4 32.6  --  32.1   RDW % 14.5 14.4 14.5 14.5 14.5  --  14.6   RDW-SD fl 48.4 48.2 47.8 49.0 49.0  --  50.2   MPV fL 10.7 10.0 9.6 9.8 9.6  --  9.7   NEUTROPHIL % % 54.4  --  59.6 61.3 57.4  --  80.4*   LYMPHOCYTE % % 15.9*  --  13.0* 12.2* 12.5*  --  3.5*   MONOCYTES % % 28.3*  --  23.6* 24.1* 28.2*  --  13.0*   EOSINOPHIL % % 0.3  --  0.3 0.2* 0.3  --  0.1*   BASOPHIL % % 0.2  --  0.2 0.2 0.1  --  0.2   IMM GRAN % %  --   --   --   --   --   --  2.8*   NEUTROS ABS 10*3/mm3 3.49 3.71 3.84 4.92 4.46  --  7.76*   LYMPHS ABS 10*3/mm3 1.02  --  0.84 0.98 0.97  --  0.34*   MONOS ABS 10*3/mm3 1.82*  --  1.52* 1.94* 2.19*  --  1.26*   EOS ABS 10*3/mm3 0.02 0.11 0.02 0.02 0.02  --  0.01   BASOS ABS 10*3/mm3 0.01  --  0.01 0.02 0.01  --  0.02   IMMATURE GRANS (ABS) 10*3/mm3  --   --   --   --   --   --  0.27*   NRBC /100 WBC  --   --   --   --   --   --  0.1                   Invalid input(s): LDLCALC                                            Imaging Results:  Imaging Results (All)     Procedure Component Value Units Date/Time    CT Head Without Contrast [[id number]] Collected: 10/09/22 0402      Updated: 10/09/22 2253    Narrative:      CT OF THE BRAIN WITHOUT CONTRAST 10/09/2022     HISTORY: Fell. Head injury.     Axial images were obtained through the brain without intravenous  contrast.     There is moderate diffuse atrophy. There is decreased attenuation of the  periventricular white matter bilaterally consistent with extensive small  vessel white matter ischemic disease.     There is no evidence of acute infarction, hemorrhage, midline shift or  mass effect.     There is relatively extensive pansinusitis.     No bony abnormalities are seen.       Impression:      1. No acute intracranial process.  2. Pansinusitis.     Radiation dose reduction techniques were utilized, including automated  exposure control and exposure modulation based on body size.     This report was finalized on 10/9/2022 10:50 PM by Dr. Jerry Brasher M.D.       CT Head Without Contrast [[id number]] Collected: 10/07/22 0722     Updated: 10/07/22 0844    Narrative:      CT OF THE BRAIN WITHOUT CONTRAST 10/07/2022     HISTORY: Fell. Follow-up hemorrhage.     Axial images were obtained through the brain without intravenous  contrast.     Previous CT of the brain dated 10/05/2022 is compared.     Again seen is a small amount of hyperdense hemorrhage along the falx  which may represent small amount of subdural, subarachnoid or  combination of subdural and subarachnoid hemorrhage. Overall this  hemorrhage appears slightly less and may be slightly less extensive than  on the 10/05/2022 study.     There is moderate diffuse atrophy. There is decreased attenuation of the  periventricular white matter bilaterally consistent with extensive small  vessel white matter ischemic disease. There is a tiny amount of  hemorrhage layering in the dependent portion of both occipital lobes and  there may be a punctate focus of subarachnoid hemorrhage in the left  parieto-occipital lobe on image 23.     There is no evidence of acute mass effect or  midline shift.     There is moderately extensive pansinusitis.       Impression:      1. Small amount of hyperdense hemorrhage along the falx as described.  This appears slightly improved from the previous study of 10/05/2022.  2. Tiny amount of blood is seen layering in the dependent portion of  both occipital lobes, not clearly seen on the previous study. There may  be a punctate focus of subarachnoid hemorrhage in the left posterior  parietal lobe as well.  3. No evidence of acute infarction or midline shift.  4. Extensive pansinusitis.     Radiation dose reduction techniques were utilized, including automated  exposure control and exposure modulation based on body size.     This report was finalized on 10/7/2022 8:40 AM by Dr. Jerry Brasher M.D.       CT Angiogram Head [[id number]] Collected: 10/06/22 0952     Updated: 10/06/22 1021    Narrative:      CT ANGIOGRAM OF THE HEAD AND NECK WITH CONTRAST     CLINICAL HISTORY: Status post multiple falls. Subarachnoid hemorrhage  seen on recent CT of the head.     TECHNIQUE: CT angiogram of the head and neck was obtained with 1 mm  axial images following the administration of IV contrast. Sagittal,  coronal, and 3-dimensional reconstructed images were obtained.  Additionally, a CT scan of the head was obtained with 3 mm axial images  before and after the administration of IV contrast.     COMPARISON: Brain MRI dated 08/26/2022 and previous CT scan of the head  dated 10/05/2022.     FINDINGS:     CTA NECK: There is a bovine configuration of the aortic arch. The aorta  arch is ectatic and tortuous measuring up to approximately 3.5 cm in  diameter. Atherosclerotic changes are appreciated within the carotid  bifurcations and proximal internal carotids although no significant  NASCET stenosis is evident. The vertebral arteries are unremarkable.     There is a small left pleural effusion. Extensive emphysematous changes  are appreciated within the visualized lung  apices.     CTA HEAD: The vertebral arteries are remarkable for mild atherosclerotic  calcifications within the V4 segment of the left vertebral artery. The  basilar artery and posterior cerebral arteries are unremarkable. Mild  atherosclerotic calcifications are appreciated within the cavernous  segments of the internal carotids. Otherwise, the middle and anterior  cerebral arteries are unremarkable.     On the previous CT scan of the head dated 10/05/2022, there were  findings consistent with a small amount of subarachnoid hemorrhage  within the interhemispheric fissure. These findings are slightly less  conspicuous on the current exam.     The ventricles, sulci, and cisterns are age appropriate. There are  moderate to severe confluent changes of chronic small vessel ischemic  phenomena. The basal ganglia and thalami are unremarkable in appearance.  The posterior fossa structures are within normal limits.     Extensive changes of inflammatory paranasal sinus disease are identified  with near complete opacification of all of the paranasal sinuses.  Partial opacification of the left middle ear cavity is seen. Mild  opacification of the mastoid air cells is noted.       Impression:         Interval evolutionary change is identified in the small amount of  subarachnoid hemorrhage appreciated within the interhemispheric fissure.  There is no evidence for a cerebral aneurysm on this examination.     Mild intracranial and intracranial atherosclerotic changes are as  discussed in detail above.     Moderate severe changes of chronic small vessel ischemic phenomena are  incidentally noted.     Extensive changes of inflammatory paranasal sinus disease are  incidentally appreciated.           Radiation dose reduction techniques were utilized, including automated  exposure control and exposure modulation based on body size.     This report was finalized on 10/6/2022 10:18 AM by Dr. Caleb Steen M.D.       CT Angiogram Neck  [[id number]] Collected: 10/06/22 0952     Updated: 10/06/22 1021    Narrative:      CT ANGIOGRAM OF THE HEAD AND NECK WITH CONTRAST     CLINICAL HISTORY: Status post multiple falls. Subarachnoid hemorrhage  seen on recent CT of the head.     TECHNIQUE: CT angiogram of the head and neck was obtained with 1 mm  axial images following the administration of IV contrast. Sagittal,  coronal, and 3-dimensional reconstructed images were obtained.  Additionally, a CT scan of the head was obtained with 3 mm axial images  before and after the administration of IV contrast.     COMPARISON: Brain MRI dated 08/26/2022 and previous CT scan of the head  dated 10/05/2022.     FINDINGS:     CTA NECK: There is a bovine configuration of the aortic arch. The aorta  arch is ectatic and tortuous measuring up to approximately 3.5 cm in  diameter. Atherosclerotic changes are appreciated within the carotid  bifurcations and proximal internal carotids although no significant  NASCET stenosis is evident. The vertebral arteries are unremarkable.     There is a small left pleural effusion. Extensive emphysematous changes  are appreciated within the visualized lung apices.     CTA HEAD: The vertebral arteries are remarkable for mild atherosclerotic  calcifications within the V4 segment of the left vertebral artery. The  basilar artery and posterior cerebral arteries are unremarkable. Mild  atherosclerotic calcifications are appreciated within the cavernous  segments of the internal carotids. Otherwise, the middle and anterior  cerebral arteries are unremarkable.     On the previous CT scan of the head dated 10/05/2022, there were  findings consistent with a small amount of subarachnoid hemorrhage  within the interhemispheric fissure. These findings are slightly less  conspicuous on the current exam.     The ventricles, sulci, and cisterns are age appropriate. There are  moderate to severe confluent changes of chronic small vessel  ischemic  phenomena. The basal ganglia and thalami are unremarkable in appearance.  The posterior fossa structures are within normal limits.     Extensive changes of inflammatory paranasal sinus disease are identified  with near complete opacification of all of the paranasal sinuses.  Partial opacification of the left middle ear cavity is seen. Mild  opacification of the mastoid air cells is noted.       Impression:         Interval evolutionary change is identified in the small amount of  subarachnoid hemorrhage appreciated within the interhemispheric fissure.  There is no evidence for a cerebral aneurysm on this examination.     Mild intracranial and intracranial atherosclerotic changes are as  discussed in detail above.     Moderate severe changes of chronic small vessel ischemic phenomena are  incidentally noted.     Extensive changes of inflammatory paranasal sinus disease are  incidentally appreciated.           Radiation dose reduction techniques were utilized, including automated  exposure control and exposure modulation based on body size.     This report was finalized on 10/6/2022 10:18 AM by Dr. Caleb Steen M.D.       CT Head Without Contrast [[id number]] Collected: 10/05/22 2329     Updated: 10/05/22 2345    Narrative:      CT HEAD WITHOUT CONTRAST; CT OF THE CERVICAL SPINE     HISTORY: Fall     COMPARISON: 08/24/2022     TECHNIQUE: Axial CT imaging was obtained through the brain. No IV  contrast was administered. Axial CT imaging was obtained through the  cervical spine. Coronal and sagittal reformatted images were obtained.     FINDINGS:  CT HEAD: The patient is noted to have hemorrhage in a parafalcine  location. However, as it appears to be interspersed within the sulci,  the appearance is more suggestive of subarachnoid hemorrhage, rather  than subdural. There is no midline shift or mass effect. The patient  does have diffuse atrophy. There is extensive periventricular and deep  white matter  microangiopathic change. The patient has near complete  opacification of the paranasal sinuses, with air-fluid levels noted  within the maxillary and sphenoid sinuses. Appearance is concerning for  sinusitis. 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 There is additional partial opacification within the  mastoid air cells, new when compared to prior exam, and correlation with  any evidence of mastoiditis is suggested. No calvarial fracture is seen.     CT CERVICAL SPINE: No acute fracture or subluxation of the cervical  spine is seen. There is mild retrolisthesis of C4 on C5, with additional  retrolisthesis of C5 on C6 noted. Intervertebral disc space narrowing is  most significant at C5-C6. These findings are stable when compared to  the prior study. Patient has biapical scarring. There is no prevertebral  soft tissue swelling.     C2-C3: There is no canal stenosis or neural foraminal narrowing.  C3-C4: There is mild canal narrowing. There is mild neural foraminal  narrowing on the left.  C4-C5: Moderate canal stenosis. There is severe neural foraminal  narrowing on the left.  C5-C6: There is moderate canal stenosis. There is bilateral neural  foraminal narrowing.  C6-C7: There is no significant stenosis. There is bilateral neural  foraminal narrowing.  C7-T1: There is no canal stenosis or neural foraminal narrowing.       Impression:         1. Parafalcine hemorrhage, favored to be subarachnoid.  2. No acute fracture or subluxation of the spine.  3. Extensive sinus inflammatory changes, concerning for sinusitis.  Bilateral mastoid effusions may represent mastoiditis.     FINDINGS were relayed to Salbador Lara at 11:40 PM.     Radiation dose reduction techniques were utilized, including automated  exposure control and exposure modulation based on body size.     This report was finalized on 10/5/2022 11:42 PM by Dr. Rocío Schreiber M.D.       CT Cervical Spine Without Contrast  [[id number]] Collected: 10/05/22 2329     Updated: 10/05/22 2345    Narrative:      CT HEAD WITHOUT CONTRAST; CT OF THE CERVICAL SPINE     HISTORY: Fall     COMPARISON: 08/24/2022     TECHNIQUE: Axial CT imaging was obtained through the brain. No IV  contrast was administered. Axial CT imaging was obtained through the  cervical spine. Coronal and sagittal reformatted images were obtained.     FINDINGS:  CT HEAD: The patient is noted to have hemorrhage in a parafalcine  location. However, as it appears to be interspersed within the sulci,  the appearance is more suggestive of subarachnoid hemorrhage, rather  than subdural. There is no midline shift or mass effect. The patient  does have diffuse atrophy. There is extensive periventricular and deep  white matter microangiopathic change. The patient has near complete  opacification of the paranasal sinuses, with air-fluid levels noted  within the maxillary and sphenoid sinuses. Appearance is concerning for  sinusitis. 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 There is additional partial opacification within the  mastoid air cells, new when compared to prior exam, and correlation with  any evidence of mastoiditis is suggested. No calvarial fracture is seen.     CT CERVICAL SPINE: No acute fracture or subluxation of the cervical  spine is seen. There is mild retrolisthesis of C4 on C5, with additional  retrolisthesis of C5 on C6 noted. Intervertebral disc space narrowing is  most significant at C5-C6. These findings are stable when compared to  the prior study. Patient has biapical scarring. There is no prevertebral  soft tissue swelling.     C2-C3: There is no canal stenosis or neural foraminal narrowing.  C3-C4: There is mild canal narrowing. There is mild neural foraminal  narrowing on the left.  C4-C5: Moderate canal stenosis. There is severe neural foraminal  narrowing on the left.  C5-C6: There is moderate canal stenosis. There is  bilateral neural  foraminal narrowing.  C6-C7: There is no significant stenosis. There is bilateral neural  foraminal narrowing.  C7-T1: There is no canal stenosis or neural foraminal narrowing.       Impression:         1. Parafalcine hemorrhage, favored to be subarachnoid.  2. No acute fracture or subluxation of the spine.  3. Extensive sinus inflammatory changes, concerning for sinusitis.  Bilateral mastoid effusions may represent mastoiditis.     FINDINGS were relayed to Salbador Lara at 11:40 PM.     Radiation dose reduction techniques were utilized, including automated  exposure control and exposure modulation based on body size.     This report was finalized on 10/5/2022 11:42 PM by Dr. Rocío Schreiber M.D.       XR Chest 1 View [[id number]] Collected: 10/05/22 1959     Updated: 10/05/22 2005    Narrative:      XR CHEST 1 VW-     HISTORY: Female who is 82 years-old,  weakness, lung cancer     TECHNIQUE: Frontal views of the chest     COMPARISON: 9/25/2022     FINDINGS: Patient is rotated towards the right. The heart is enlarged.  Aorta is tortuous. Old granulomatous disease is apparent. A previous CT  demonstrated stellate tumor in the left lower lobe at the lung is not  well distinguished on this exam. Minimal atelectasis or infiltrate at  the bases. No pleural effusion, or pneumothorax. No acute osseous  process.       Impression:      Mild atelectasis or infiltrate at the bases. Cardiomegaly.  Tortuous aorta.     This report was finalized on 10/5/2022 8:02 PM by Dr. Toby Lang M.D.           --    Discharge Disposition  Skilled Nursing Facility (DC - External)    Discharge Medications     Discharge Medications      Continue These Medications      Instructions Start Date   acetaminophen 325 MG tablet  Commonly known as: TYLENOL   650 mg, Oral, Every 4 Hours PRN      atorvastatin 20 MG tablet  Commonly known as: LIPITOR   20 mg, Oral, Daily      bisacodyl 5 MG EC tablet  Commonly known as:  DULCOLAX   10 mg, Oral, Daily PRN      budesonide-formoterol 160-4.5 MCG/ACT inhaler  Commonly known as: SYMBICORT   2 puffs, Inhalation, 2 Times Daily - RT      calcium carb-cholecalciferol 600-800 MG-UNIT tablet   2 tablets, Oral, Daily      donepezil 5 MG tablet  Commonly known as: ARICEPT   5 mg, Oral, Daily      estradiol 2 MG vaginal ring  Commonly known as: ESTRING   2 mg, Vaginal, Every 3 Months, follow package directions      fish oil 1000 MG capsule capsule   1,000 mg, Oral, 2 Times Daily      letrozole 2.5 MG tablet  Commonly known as: FEMARA   2.5 mg, Oral, Daily      levothyroxine 125 MCG tablet  Commonly known as: SYNTHROID, LEVOTHROID   125 mcg, Oral, Every Early Morning      OLANZapine 5 MG tablet  Commonly known as: zyPREXA   5 mg, Oral, 2 Times Daily      Oxyquinoline-Sod Lauryl Sulf 0.025-0.01 % gel   Vaginal, 3 Times Weekly      PreserVision AREDS capsule   1 tablet, Oral, 2 Times Daily      silver sulfadiazine 1 % cream  Commonly known as: SILVADENE, SSD   1 application, Topical, As Needed      triamcinolone 0.1 % cream  Commonly known as: KENALOG   1 application, Topical, As Needed      Vitamin D (Cholecalciferol) 10 MCG (400 UNIT) tablet  Commonly known as: CHOLECALCIFEROL   400 Units, Oral, Daily         Stop These Medications    dexamethasone 1.5 MG tablet  Commonly known as: DECADRON            Discharge Diet: regular diet    Activity at Discharge:      Follow-up Information     Duncan Garcia MD .    Specialty: General Practice  Contact information:  [address] [place], [place] [id number] [phone]                       See primary care provider, Duncan Garcia MD, in 1-2 weeks        Test Results Pending at Discharge       Max Barragan MD  [facility], [facility]  Pulmonary and Critical Care Medicine  10/13/22  07:43 EDT    Time: greater than 30 minutes.        Total time spent discharging patient including evaluation, post hospitalization follow up,  medication and post hospitalization instructions and education total time exceeds 30 minutes.

## 2022-10-25 NOTE — PROGRESS NOTES
"Enter Query Response Below      Query Response:     Unable to determine         If applicable, please update the problem list.           Patient: Kennedi Cardenas        : 1940  Account: [id number]           Admit Date: 10/5/2022        How to Respond to this query:       a. Click New Note     b. Answer query within the yellow box.                c. Update the Problem List, if applicable.      If you have any questions about this query contact me at: [email or web address]     ,    Patient is a 82 year old female admitted on 10/5/22 with a subarachnoid hemorrhage post fall.  Patient with documented history of dementia and takes aricept and zyprexa at home per medication list prior to admission. H&P documents vascular dementia. Neurosurgery progress notes on 10/7 documents, \"Severe dementia at baseline,  Opens eyes to voice, Currently avoiding following commands and indicating that I am making her mad and telling me to leave her alone,  Speech is clear,  Face is symmetric,  Pupils are equal and reactive to light,  Moves all 4 extremities well with no obvious focal deficits.\" Patient will answer questions and participate with exam at times. Discharge summary documents, \" She has a relatively new diagnosis of dementia which was originally found after she drove around little for 10 hours and a disorderly/lost manner.  Separately has been undergoing work-up further so the  preceding few months or so according to the patient's family.  The patient is intermittently lucid can provide insight at times and other times cannot provide details regarding care.\" Treated with continued aricept and zyprexa during hospital stay. IV haldol given prn for agitation.     Can this be further clarified as:    Severe vascular dementia  Other, please specify____________  Unable to determine       By submitting this query, we are merely seeking further clarification of documentation to accurately reflect all conditions that " you are monitoring, evaluating, treating or that extend the hospitalization or utilize additional resources of care. Please utilize your independent clinical judgment when addressing the question(s) above.     This query and your response, once completed, will be entered into the legal medical record.    Sincerely,  Anupama Spence RN  Clinical Documentation Integrity Program

## 2022-11-03 ENCOUNTER — TELEPHONE (OUTPATIENT)
Dept: ONCOLOGY | Facility: CLINIC | Age: 82
End: 2022-11-03

## 2022-11-03 NOTE — TELEPHONE ENCOUNTER
Returned call to Bhanu regarding radiation referral. Per Bhanu, pt was referred to radiation on 9/6 and they have not had any success getting a hold of her. They called [facility] and [facility] to res[facility] but this was over a month ago. She was a no show for her apt with Dr. Cespedes last week also. Bhanu would like to know if they still need to keep this referral. Please advise.

## 2022-11-03 NOTE — TELEPHONE ENCOUNTER
Bhanu call from Radiation to say they cannot get a hold of the patient to schedule Radiation therapy.  Wants to know if the referral could be cancelled.

## 2022-11-07 ENCOUNTER — TELEPHONE (OUTPATIENT)
Dept: ONCOLOGY | Facility: CLINIC | Age: 82
End: 2022-11-07

## 2022-11-07 NOTE — TELEPHONE ENCOUNTER
Returned call to Bhanu. I advised him to close the referral.  I have asked scheduling to try to reach them about rescheduling as she did not come for her hospital follow up with Dr Cespedes either.

## 2022-11-07 NOTE — TELEPHONE ENCOUNTER
Bhanu is calling back about her referral to them.  He is trying to close out their referrals and the patient has not returned any calls to schedule with them

## 2022-11-08 ENCOUNTER — TELEPHONE (OUTPATIENT)
Dept: ONCOLOGY | Facility: CLINIC | Age: 82
End: 2022-11-08

## 2022-11-08 NOTE — TELEPHONE ENCOUNTER
----- Message from Lexis Stinson RN sent at 11/7/2022  2:47 PM EST -----  This pt was supposed to be a hospital follow up and did not show.  I believe she is in rehab.  Could you call her son and see if they want to reschedule?     Thank you!

## 2023-07-30 NOTE — PROGRESS NOTES
Name: Kennedi Cardenas ADMIT: 2022   : 1940  PCP: Duncan Garcia MD    MRN: [id number] LOS: 8 days   AGE/SEX: 82 y.o. female  ROOM: [facility]     Subjective   Subjective    Patient without complaints this morning.    Review of Systems    Neg for CP, N/V/D    Objective   Objective   Vital Signs  Temp:  [97 °F (36.1 °C)-97.6 °F (36.4 °C)] 97.5 °F (36.4 °C)  Heart Rate:  [67-79] 76  Resp:  [15-24] 20  BP: (114-153)/(67-83) 114/71  SpO2:  [91 %-98 %] 92 %  on  Flow (L/min):  [2] 2;   Device (Oxygen Therapy): nasal cannula  Body mass index is 26.15 kg/m².  Physical Exam  Constitutional:       Appearance: Normal appearance.   Cardiovascular:      Rate and Rhythm: Normal rate and regular rhythm.      Heart sounds: No murmur heard.    No gallop.   Pulmonary:      Effort: Pulmonary effort is normal. No respiratory distress.      Breath sounds: Normal breath sounds. No wheezing.      Comments: On NC  Abdominal:      General: There is no distension.      Palpations: There is no mass.      Tenderness: There is no abdominal tenderness.   Neurological:      General: No focal deficit present.      Mental Status: She is alert and oriented to person, place, and time.   Psychiatric:         Mood and Affect: Mood normal.         Behavior: Behavior normal.         Results Review     I reviewed the patient's new clinical results.  Results from last 7 days   Lab Units 22  0553   WBC 10*3/mm3 4.73   HEMOGLOBIN g/dL 10.0*   PLATELETS 10*3/mm3 155     Results from last 7 days   Lab Units 22  0530 22  0600 22  0533 22  0650   SODIUM mmol/L 139 135* 140 137   POTASSIUM mmol/L 4.3 4.2 4.1 3.5   CHLORIDE mmol/L 98 98 101 97*   CO2 mmol/L 30.8* 30.9* 34.5* 33.0*   BUN mg/dL 11 11 11 7*   CREATININE mg/dL 0.58 0.48* 0.56* 0.50*   GLUCOSE mg/dL 86 90 85 110*   EGFR mL/min/1.73 91.0 95.3 91.8 94.4     Results from last 7 days   Lab Units 22  0553   ALBUMIN g/dL 3.30*   BILIRUBIN mg/dL 0.4   ALK PHOS  U/L 38*   AST (SGOT) U/L 170*   ALT (SGPT) U/L 57*     Results from last 7 days   Lab Units 09/02/22  0530 09/01/22  0600 08/31/22  0533 08/30/22  0650 08/29/22  1546 08/28/22  0553   CALCIUM mg/dL 9.2 8.7 8.8 8.5*   < > 8.1*   ALBUMIN g/dL  --   --   --   --   --  3.30*    < > = values in this interval not displayed.         No results found for: HGBA1C, POCGLU    XR Chest 1 View    Result Date: 9/2/2022  No appreciable pneumothorax  This report was finalized on 9/2/2022 10:13 AM by Dr. Minor Calero M.D.      XR Chest 1 View    Result Date: 9/1/2022  Possible minimal left apical pneumothorax versus rib shadow. Another chest x-ray will be performed in 2 hours to reassess.  This report was finalized on 9/1/2022 4:51 PM by Dr. Minor Calero M.D.      CT Needle Biopsy Lung    Result Date: 9/1/2022  Technically successful CT guided left lung biopsy.  Moderate sedation was provided under my direct supervision using 0.5 mg IV Versed and 25 mcg IV Fentanyl. The patient was independently monitored by a trained Department of Radiology RN using automated blood pressure, EKG, and pulse oximetry. My total intra-service time was 12 minutes.     Radiation dose reduction techniques were utilized, including automated exposure control and exposure modulation based on body size.  This report was finalized on 9/1/2022 3:40 PM by Dr. Minor Calero M.D.      Scheduled Medications  amLODIPine, 2.5 mg, Oral, Q24H  aspirin, 324 mg, Oral, Once  atorvastatin, 20 mg, Oral, Daily  budesonide-formoterol, 2 puff, Inhalation, BID - RT  cholecalciferol, 400 Units, Oral, Daily  donepezil, 5 mg, Oral, Daily  hydrocortisone-bacitracin-zinc oxide-nystatin, 1 application, Topical, BID  levothyroxine, 125 mcg, Oral, Q AM  nicotine, 1 patch, Transdermal, Q24H  senna-docusate sodium, 2 tablet, Oral, BID  sodium chloride, 10 mL, Intravenous, Q12H    Infusions  hold, 1 each    Diet  Diet Regular       Assessment/Plan     Active Hospital Problems     Diagnosis  POA   • Small vessel disease, cerebrovascular severe [I67.9]  Yes   • Cerebral atrophy (HCC) [G31.9]  Yes   • Panlobular emphysema (HCC) [J43.1]  Yes   • Suspicious spiculated left lower lobe lung mass worrisome for primary lung cancer [R91.8]  Yes   • Acute respiratory failure with hypoxia (HCC) [J96.01]  Yes   • Traumatic rhabdomyolysis, initial encounter (HCC) [T79.6XXA]  Yes   • Vascular dementia without behavioral disturbance (HCC) [F01.50]  Yes   • 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]  Yes   • Hypoxia [R09.02]  Yes   • Tobacco abuse [Z72.0]  Yes   • Recurrent falls [R29.6]  Not Applicable   • Dementia without behavioral disturbance (HCC) [F03.90]  Yes   • Impaired glucose tolerance [R73.02]  Yes   • Essential hypertension [I10]  Yes   • Acquired hypothyroidism [E03.9]  Yes   • HLD (hyperlipidemia) [E78.5]  Yes   • Vitamin D deficiency [E55.9]  Yes      Resolved Hospital Problems    Diagnosis Date Resolved POA   • **Non-traumatic rhabdomyolysis [M62.82] 09/02/2022 Yes   • Scalp hematoma, initial encounter [S00.03XA] 09/02/2022 Yes   • Leukocytosis [D72.829] 09/02/2022 Yes       82 y.o. female admitted with Non-traumatic rhabdomyolysis.     Spiculated lung nodule  -concerning for malignancy on imaging  -s/p CT guided bx w/ IR- not adequate sample  -family not interested in surgery of CTX; would consider XRT or immunotherapy  -s/p thoracentesis 8/31/22; cytology negative  -s/p repeat CT-guided biopsy 9/1/2022, cytology pending     COPD/emphysema, respiratory failure with hypoxia:  -cont Symbicort, PRN albuterol  -smoking cessation     Hypothyroidism  -TSH 11.5 (8/25/22)  -increased Synthroid from 112mcg to 125 mcg daily   -Recheck in 6 weeks as OP (around 10/14/22)     HTN  -Continue amlodipine 2.5 mg    Rhabdomyolysis, resolved  -in setting of fall; pending dc to SNF    · SCDs for DVT prophylaxis.  · Full code.  · Discussed with patient.  · Anticipate discharge to SNU facility when cleared by  consultants.      Jim Borrego MD  [facility]  09/02/22  12:13 EDT       Attending Attestation (For Attendings USE Only)...

## (undated) DEVICE — PK ANT HIP 40

## (undated) DEVICE — 3M™ STERI-STRIP™ REINFORCED ADHESIVE SKIN CLOSURES, R1547, 1/2 IN X 4 IN (12 MM X 100 MM), 6 STRIPS/ENVELOPE: Brand: 3M™ STERI-STRIP™

## (undated) DEVICE — SUT MNCRYL 3/0 PS2 18IN MCP497G

## (undated) DEVICE — OPTIFOAM GENTLE SA, POSTOP, 4X8: Brand: MEDLINE

## (undated) DEVICE — GLV SURG SENSICARE MICRO PF LF 8 STRL

## (undated) DEVICE — SOL ISO/ALC RUB 70PCT 4OZ

## (undated) DEVICE — SYR CONTRL LUERLOK 10CC

## (undated) DEVICE — DRSNG WND GEL FIBR OPTICELL AG PLS W/SLV LF 4X5IN  STRL

## (undated) DEVICE — SHEET, DRAPE, SPLIT, STERILE: Brand: MEDLINE

## (undated) DEVICE — 1010 S-DRAPE TOWEL DRAPE 10/BX: Brand: STERI-DRAPE™

## (undated) DEVICE — APPL CHLORAPREP W/TINT 26ML ORNG

## (undated) DEVICE — GLV SURG SENSICARE PI LF PF 8 GRN STRL

## (undated) DEVICE — SKIN PREP TRAY W/CHG: Brand: MEDLINE INDUSTRIES, INC.

## (undated) DEVICE — 2108 SERIES SAGITTAL BLADE (19.5 X 1.27 X 81.0MM)

## (undated) DEVICE — MAT FLR ABSORBENT LG 4FT 10 2.5FT